# Patient Record
Sex: FEMALE | Race: WHITE | NOT HISPANIC OR LATINO | Employment: OTHER | URBAN - METROPOLITAN AREA
[De-identification: names, ages, dates, MRNs, and addresses within clinical notes are randomized per-mention and may not be internally consistent; named-entity substitution may affect disease eponyms.]

---

## 2017-01-06 ENCOUNTER — TELEPHONE (OUTPATIENT)
Dept: TRANSPLANT | Facility: CLINIC | Age: 57
End: 2017-01-06

## 2017-01-09 ENCOUNTER — OFFICE VISIT (OUTPATIENT)
Dept: TRANSPLANT | Facility: CLINIC | Age: 57
End: 2017-01-09
Payer: COMMERCIAL

## 2017-01-09 ENCOUNTER — LAB VISIT (OUTPATIENT)
Dept: LAB | Facility: HOSPITAL | Age: 57
End: 2017-01-09
Attending: INTERNAL MEDICINE
Payer: COMMERCIAL

## 2017-01-09 VITALS
WEIGHT: 128.06 LBS | TEMPERATURE: 98 F | HEART RATE: 85 BPM | OXYGEN SATURATION: 97 % | DIASTOLIC BLOOD PRESSURE: 49 MMHG | BODY MASS INDEX: 24.18 KG/M2 | RESPIRATION RATE: 17 BRPM | SYSTOLIC BLOOD PRESSURE: 146 MMHG | HEIGHT: 61 IN

## 2017-01-09 DIAGNOSIS — T86.11 ACUTE REJECTION OF KIDNEY TRANSPLANT: ICD-10-CM

## 2017-01-09 DIAGNOSIS — Z94.0 DECEASED-DONOR KIDNEY TRANSPLANT: Chronic | ICD-10-CM

## 2017-01-09 DIAGNOSIS — D84.9 IMMUNOSUPPRESSION: Chronic | ICD-10-CM

## 2017-01-09 DIAGNOSIS — Z94.4 STATUS POST LIVER TRANSPLANTATION: Primary | ICD-10-CM

## 2017-01-09 DIAGNOSIS — Z94.4 STATUS POST LIVER TRANSPLANTATION: ICD-10-CM

## 2017-01-09 LAB
ALBUMIN SERPL BCP-MCNC: 3.5 G/DL
ALP SERPL-CCNC: 101 U/L
ALT SERPL W/O P-5'-P-CCNC: 6 U/L
ANION GAP SERPL CALC-SCNC: 10 MMOL/L
AST SERPL-CCNC: 10 U/L
BILIRUB SERPL-MCNC: 0.4 MG/DL
BUN SERPL-MCNC: 30 MG/DL
CALCIUM SERPL-MCNC: 9.3 MG/DL
CHLORIDE SERPL-SCNC: 105 MMOL/L
CO2 SERPL-SCNC: 22 MMOL/L
CREAT SERPL-MCNC: 1.4 MG/DL
EST. GFR  (AFRICAN AMERICAN): 48.1 ML/MIN/1.73 M^2
EST. GFR  (NON AFRICAN AMERICAN): 41.7 ML/MIN/1.73 M^2
GLUCOSE SERPL-MCNC: 98 MG/DL
POTASSIUM SERPL-SCNC: 3.8 MMOL/L
PROT SERPL-MCNC: 7.4 G/DL
SODIUM SERPL-SCNC: 137 MMOL/L

## 2017-01-09 PROCEDURE — 1159F MED LIST DOCD IN RCRD: CPT | Mod: S$GLB,,, | Performed by: INTERNAL MEDICINE

## 2017-01-09 PROCEDURE — 99214 OFFICE O/P EST MOD 30 MIN: CPT | Mod: S$GLB,,, | Performed by: INTERNAL MEDICINE

## 2017-01-09 PROCEDURE — 99999 PR PBB SHADOW E&M-EST. PATIENT-LVL III: CPT | Mod: PBBFAC,,, | Performed by: INTERNAL MEDICINE

## 2017-01-09 PROCEDURE — 80053 COMPREHEN METABOLIC PANEL: CPT

## 2017-01-09 PROCEDURE — 36415 COLL VENOUS BLD VENIPUNCTURE: CPT

## 2017-01-09 RX ORDER — CEFPODOXIME PROXETIL 200 MG/1
TABLET, FILM COATED ORAL
Refills: 0 | COMMUNITY
Start: 2016-12-29 | End: 2018-10-22

## 2017-01-09 NOTE — PROGRESS NOTES
Subjective:       Patient ID: Virginia Lentz is a 56 y.o. female.    Chief Complaint: Liver Transplant Follow-up    HPI  I saw this 56 yr old  lady in the liver transplant follow up clinic. 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    She is now 5 years post transplant and is doing very well.  She had an episode of recidivism but is off alcohol now and is very positive about her long term outlook.    She had an episode of acute kidney rejection early on post op but her LFTs have generally been normal. She is on immunosuppression with Tacrolimus, MMF and prednisone (Pred 5 mg, MMF 750mg BID,Tac 2).    She is well and does not have any symptoms related to her abdomen or liver.      Recent issues with recurrent right lobe pneumonia- under pulmonary follow up.  - mechanical issue with stricturing.  - on recurrent courses of antibiotics and under local pulmonary follow up.          Lab Results   Component Value Date    ALT 6 (L) 10/20/2014    AST 12 10/20/2014    GGT 73 (H) 10/20/2014    ALKPHOS 80 10/20/2014    BILITOT 0.4 10/20/2014     Past Medical History   Diagnosis Date    Alcoholic cirrhosis 7/10/2012    CKD (chronic kidney disease) stage 3, GFR 30-59 ml/min     -donor kidney transplant 7/10/2012    Hypocitraturic calcium nephrolithiasis 2013 24 urine showed low urine citrate.    Hypothyroidism 7/10/2012    Liver replaced by transplant 7/10/2012    Need for prophylactic immunotherapy 7/10/2012    Pancreatitis  Aug 2011    Recurrent nephrolithiasis 7/10/2012     Past Surgical History   Procedure Laterality Date    Kidney transplant      Liver transplant      Tonsillectomy      Appendectomy       section      Tracheostomy tube placement      Tracheostomy closure  Mar 2012     Current Outpatient Prescriptions   Medication Sig    alprazolam (XANAX) 0.25 MG tablet  Take 0.5 mg by mouth Twice daily.     amlodipine (NORVASC) 5 MG tablet Take 2 tablets (10 mg total) by mouth once daily. 1 Tablet Oral Every day    aspirin (ECOTRIN) 81 MG EC tablet Take 81 mg by mouth Daily. 1 Tablet, Delayed Release (E.C.) Oral Every day.      biotin 500 mcg Cap Take 1 mg by mouth Daily. 1  Oral Every day    calcitonin, salmon, (FORTICAL) 200 unit/actuation nasal spray 1 spray by Nasal route nightly.     cloNIDine (CATAPRES) 0.1 MG tablet Take 0.1 mg by mouth nightly.     hydrocodone-acetaminophen 7.5-325mg (NORCO) 7.5-325 mg per tablet Take 1 tablet by mouth 2 (two) times daily.     loperamide (IMODIUM) 2 mg capsule Take 2 mg by mouth daily as needed. 1 Capsule Oral     magnesium oxide (MAG-OX) 400 mg tablet Take 2 tablets by mouth 2 (two) times daily.    multivitamin (THERAGRAN) per tablet Take by mouth Daily. 1 Tablet Oral Every day.  Over-the-counter Multivitamin    mycophenolate (CELLCEPT) 250 mg Cap TAKE 3 CAPSULES TWICE DAILY    NEXIUM 40 mg capsule Take 40 mg by mouth 2 (two) times daily.     ondansetron (ZOFRAN ODT) 8 MG TbDL Take 8 mg by mouth Use as needed. 1 Tablet, Rapid Dissolve Oral Every 12 hours    predniSONE (DELTASONE) 10 MG tablet Take 5 mg by mouth Daily. Half Tablet Oral as directed    sertraline (ZOLOFT) 25 MG tablet Take 100 mg by mouth once daily. 1 Tablet Oral Every day    tacrolimus (PROGRAF) 1 MG Cap TAKE (2) CAPSULES TWICE DAILY. (Patient taking differently: TAKE (2) in the morning and (1) at night.)    zolpidem (AMBIEN) 5 MG Tab Take 5 mg by mouth every evening. 1 Tablet Oral At bedtime    cefpodoxime (VANTIN) 200 MG tablet take 1 tablet by mouth every 12 hours for 14 days     No current facility-administered medications for this visit.        Review of Systems   Constitutional: Negative for activity change, appetite change, chills, fatigue, fever and unexpected weight change.   HENT: Negative for ear pain, hearing loss, mouth sores, nosebleeds, sore  throat and trouble swallowing.    Eyes: Negative for pain, redness and visual disturbance.   Respiratory: Negative for cough, chest tightness, shortness of breath and wheezing.    Cardiovascular: Negative for chest pain, palpitations and leg swelling.   Gastrointestinal: Negative for abdominal distention, abdominal pain, blood in stool, constipation, diarrhea, nausea and vomiting.   Genitourinary: Negative for decreased urine volume, difficulty urinating, dysuria, frequency, hematuria, menstrual problem and urgency.   Musculoskeletal: Negative for arthralgias, back pain, gait problem, joint swelling and myalgias.   Skin: Negative for pallor, rash and wound.   Neurological: Negative for dizziness, tremors, seizures, syncope, speech difficulty, weakness, light-headedness, numbness and headaches.   Hematological: Negative for adenopathy. Does not bruise/bleed easily.   Psychiatric/Behavioral: Negative for confusion, decreased concentration, dysphoric mood and sleep disturbance. The patient is not nervous/anxious.          Objective:      Physical Exam   Constitutional: She appears well-developed and well-nourished. No distress.   HENT:   Head: Normocephalic.   Eyes: Pupils are equal, round, and reactive to light.   Neck: No JVD present. No tracheal deviation present. No thyromegaly present.   Cardiovascular: Normal rate, regular rhythm and normal heart sounds.    No murmur heard.  Pulmonary/Chest: Effort normal and breath sounds normal. No stridor.   Abdominal: Soft.   Lymphadenopathy:        Head (right side): No submental, no submandibular, no tonsillar, no preauricular, no posterior auricular and no occipital adenopathy present.        Head (left side): No submental, no submandibular, no tonsillar, no preauricular, no posterior auricular and no occipital adenopathy present.     She has no cervical adenopathy.     She has no axillary adenopathy.   Neurological: She is alert. She has normal strength. She is not  disoriented. No cranial nerve deficit or sensory deficit.   Skin: Skin is intact. No cyanosis.             Assessment:       1. Status post liver transplantation    2. -donor kidney transplant    3. Acute rejection of kidney transplant    4. Immunosuppression        Plan:   Overall well.    - no change in her immunosuppression in view of her early kidney rejection.  - recurrent pneumonia due to mechanical issue dealt with locally with dilatations.  - BP normally well controlled on 2 agents- PCP  - creatinine within normal range but a little higher than baseline- will recheck today.    Clinic in 1 year.

## 2017-01-09 NOTE — MR AVS SNAPSHOT
[facility]  [address]  [facility] [address]  Phone: [phone]                  Virginia Lentz   2017 8:00 AM   Office Visit    Description:  Female : 1960   Provider:  Ruben Ferrell MD   Department:  [facility]           Reason for Visit     Liver Transplant Follow-up           Diagnoses this Visit        Comments    Status post liver transplantation    -  Primary            To Do List           Goals (5 Years of Data)     None      [facility]     [facility] Care Line -  Assistance  Registered nurses in the [facility] On Call Center provide clinical advisement, health education, appointment booking, and other advisory services.  Call for this free service at [phone].             Medications           Message regarding Medications     Verify the changes and/or additions to your medication regime listed below are the same as discussed with your clinician today.  If any of these changes or additions are incorrect, please notify your healthcare provider.             Verify that the below list of medications is an accurate representation of the medications you are currently taking.  If none reported, the list may be blank. If incorrect, please contact your healthcare provider. Carry this list with you in case of emergency.           Current Medications     alprazolam (XANAX) 0.25 MG tablet Take 0.5 mg by mouth Twice daily.     amlodipine (NORVASC) 5 MG tablet Take 2 tablets (10 mg total) by mouth once daily. 1 Tablet Oral Every day    aspirin (ECOTRIN) 81 MG EC tablet Take 81 mg by mouth Daily. 1 Tablet, Delayed Release (E.C.) Oral Every day.      biotin 500 mcg Cap Take 1 mg by mouth Daily. 1  Oral Every day    calcitonin, salmon, (FORTICAL) 200 unit/actuation nasal spray 1 spray by Nasal route nightly.     cloNIDine (CATAPRES) 0.1 MG tablet Take 0.1 mg by mouth nightly.     hydrocodone-acetaminophen 7.5-325mg (NORCO)  "7.5-325 mg per tablet Take 1 tablet by mouth 2 (two) times daily.     loperamide (IMODIUM) 2 mg capsule Take 2 mg by mouth daily as needed. 1 Capsule Oral     magnesium oxide (MAG-OX) 400 mg tablet Take 2 tablets by mouth 2 (two) times daily.    multivitamin (THERAGRAN) per tablet Take by mouth Daily. 1 Tablet Oral Every day.  Over-the-counter Multivitamin    mycophenolate (CELLCEPT) 250 mg Cap TAKE 3 CAPSULES TWICE DAILY    NEXIUM 40 mg capsule Take 40 mg by mouth 2 (two) times daily.     ondansetron (ZOFRAN ODT) 8 MG TbDL Take 8 mg by mouth Use as needed. 1 Tablet, Rapid Dissolve Oral Every 12 hours    predniSONE (DELTASONE) 10 MG tablet Take 5 mg by mouth Daily. Half Tablet Oral as directed    sertraline (ZOLOFT) 25 MG tablet Take 100 mg by mouth once daily. 1 Tablet Oral Every day    tacrolimus (PROGRAF) 1 MG Cap TAKE (2) CAPSULES TWICE DAILY.    zolpidem (AMBIEN) 5 MG Tab Take 5 mg by mouth every evening. 1 Tablet Oral At bedtime    cefpodoxime (VANTIN) 200 MG tablet take 1 tablet by mouth every 12 hours for 14 days           Clinical Reference Information           Vital Signs - Last Recorded  Most recent update: 1/9/2017  8:14 AM by Kourtney Baldwin MA    BP Pulse Temp Resp Ht Wt    (!) 146/49 (BP Location: Right arm, Patient Position: Sitting, BP Method: Automatic) 85 98.2 °F (36.8 °C) (Oral) 17 5' 1" (1.549 m) 58.1 kg (128 lb 1.4 oz)    SpO2 BMI             97% 24.2 kg/m2         Blood Pressure          Most Recent Value    BP  (!)  146/49      Allergies as of 1/9/2017     Levaquin [Levofloxacin]      Immunizations Administered on Date of Encounter - 1/9/2017     None      Orders Placed During Today's Visit     Future Labs/Procedures Expected by Expires    Comprehensive metabolic panel  1/9/2017 3/10/2018      Maintenance Dialysis History     Patient has no recorded history of maintenance dialysis.      Transplant Information        Txp Date Organ Coordinator Care Team    11/29/2011 Liver Angelic Spence, " RN Surgeon:  Oc Ritchie MD   Referring Physician:  Melissa Garcias MD   Assisting Surgeon:  Alex Roe MD   Current Hepatologist:  Ruben Ferrell MD   Corresponding Physician:  Melissa Garcias MD   Current PCP:  Melissa Garcias MD          Txp Date Organ Coordinator Care Team    11/29/2011 Kidney Angelic Spence RN Surgeon:  Oc Ritchie MD   Referring Physician:  Melissa Garcias MD

## 2017-01-09 NOTE — Clinical Note
January 9, 2017        Melissa Garcias             [facility]  [address]  [facility] [address]  Phone: [phone]   Patient: Virginia Lentz   MR Number: [id number]   YOB: 1960   Date of Visit: 1/9/2017       Dear Dr. Melissa Garcias    Thank you for referring Virginia Lentz to me for evaluation. Attached you will find relevant portions of my assessment and plan of care.    If you have questions, please do not hesitate to call me. I look forward to following Virginia Lentz along with you.    Sincerely,    Ruben Ferrell MD    Enclosure    If you would like to receive this communication electronically, please contact [email or web address] or [phone] to request [employer] access.    [employer] is a tool which provides read-only access to select patient information with whom you have a relationship. Its easy to use and provides real time access to review your patients record including encounter summaries, notes, results, and demographic information.    If you feel you have received this communication in error or would no longer like to receive these types of communications, please e-mail [email or web address]

## 2017-01-16 ENCOUNTER — TELEPHONE (OUTPATIENT)
Dept: TRANSPLANT | Facility: CLINIC | Age: 57
End: 2017-01-16

## 2017-01-16 NOTE — TELEPHONE ENCOUNTER
Pt's lab did not draw a Tacrolimus level.  Tacrine level resulted. Called pt to ask to repeat labs. No answer, message left for callack with instructions to repeat labs tomorrow.

## 2017-02-01 ENCOUNTER — TELEPHONE (OUTPATIENT)
Dept: TRANSPLANT | Facility: CLINIC | Age: 57
End: 2017-02-01

## 2017-02-01 NOTE — TELEPHONE ENCOUNTER
"----- Message from Ruben Ferrell MD sent at 1/25/2017  9:18 AM CST -----  After reviewing the microblading technique, I reached the conclusion that it does carry some risk - mainly infection- because of multiple small cuts to the skin.  We cannot recommend it but ultimately it's her decision.    GT  ----- Message -----     From: Angelic Spence RN     Sent: 1/24/2017   5:01 PM       To: Ruben Ferrell MD    This pt has experienced hair loss and would like to have microblasion done to eyebrows.  Per wikipedia  "Microblading, also known by a variety of names such as eyebrow embroidery, microstroking, feather touch and hair like strokes is a form of permanent makeup that provides a means to partially or fully camouflage missing eyebrow hair with the appearance of simulated hair using fine deposits of cosmetic tattoo pigments.".   Is this okay for her to have this done?    "

## 2017-03-01 RX ORDER — MYCOPHENOLATE MOFETIL 250 MG/1
CAPSULE ORAL
Qty: 180 CAPSULE | Refills: 6 | Status: SHIPPED | OUTPATIENT
Start: 2017-03-01 | End: 2017-11-20 | Stop reason: SDUPTHER

## 2017-05-18 ENCOUNTER — TELEPHONE (OUTPATIENT)
Dept: TRANSPLANT | Facility: CLINIC | Age: 57
End: 2017-05-18

## 2017-05-18 NOTE — TELEPHONE ENCOUNTER
Patient was due for lab on 5/15/17.  Called patient's lab, they report patient did not have labs drawn.  Left message on patient's voicemail requesting call to office.

## 2017-05-26 ENCOUNTER — TELEPHONE (OUTPATIENT)
Dept: TRANSPLANT | Facility: CLINIC | Age: 57
End: 2017-05-26

## 2017-05-26 NOTE — TELEPHONE ENCOUNTER
----- Message from Leela Dale sent at 5/26/2017 11:38 AM CDT -----  Called patient's lab to request results.  Lab reports patient did not come in for lab.  Called patient, she said, she will go on 5/29/17.

## 2017-06-02 ENCOUNTER — TELEPHONE (OUTPATIENT)
Dept: TRANSPLANT | Facility: CLINIC | Age: 57
End: 2017-06-02

## 2017-06-02 NOTE — TELEPHONE ENCOUNTER
----- Message from Leela Dale sent at 6/2/2017  2:04 PM CDT -----  Called patient's lab to request results.  Lab reports patient did not come in for lab.  Called patient and left message requesting labs be drawn, 6/5/17.

## 2017-06-08 LAB
EXT ALBUMIN: 4.2
EXT ALKALINE PHOSPHATASE: 240
EXT ALT: 66
EXT AST: 87
EXT BASOPHIL%: 1
EXT BILIRUBIN DIRECT: 0.1 MG/DL
EXT BILIRUBIN TOTAL: 0.6
EXT BUN: 18
EXT CALCIUM: 9.7
EXT CHLORIDE: 101
EXT CO2: 18
EXT CREATININE: 1.38 MG/DL
EXT EOSINOPHIL%: 1.8
EXT GLUCOSE: 73
EXT HEMATOCRIT: 38
EXT HEMOGLOBIN: 12.2
EXT LYMPH%: 16.1
EXT MONOCYTES%: 7.5
EXT PLATELETS: 354
EXT POTASSIUM: 4.5
EXT PROTEIN TOTAL: 7
EXT SEGS%: 73.5
EXT SODIUM: 135 MMOL/L
EXT TACROLIMUS LVL: 5.7
EXT WBC: 6.1

## 2017-06-15 ENCOUNTER — TELEPHONE (OUTPATIENT)
Dept: TRANSPLANT | Facility: CLINIC | Age: 57
End: 2017-06-15

## 2017-06-15 NOTE — TELEPHONE ENCOUNTER
----- Message from Ruben Ferrell MD sent at 6/9/2017  4:38 PM CDT -----  Results reviewed  Repeat next week. Is she drinking again

## 2017-06-15 NOTE — TELEPHONE ENCOUNTER
Spoke to pt. Pt reports that she did consume alcohol after a close friend passed away.  She confirmed that she has not consumed alcohol since that time.  Discussed with pt the importance of abstaining from alcohol after liver transplant. Pt verbalized understanding and will repeat labs on 6/22.

## 2017-06-15 NOTE — TELEPHONE ENCOUNTER
Labs reviewed by Dr. Ferrell. Elevated liver enzymes, pt needs repeat labs.  Called pt to advise. Voicemail left on mobile and home phone for callback today.

## 2017-06-15 NOTE — TELEPHONE ENCOUNTER
----- Message from Lynda Rios sent at 6/15/2017  2:13 PM CDT -----  Contact: pt  Will be having labs done on 6/22 wanted to let Angelic know

## 2017-06-24 ENCOUNTER — NURSE TRIAGE (OUTPATIENT)
Dept: ADMINISTRATIVE | Facility: CLINIC | Age: 57
End: 2017-06-24

## 2017-06-24 NOTE — TELEPHONE ENCOUNTER
"  Reason for Disposition   [1] Caller requests to speak ONLY to PCP AND [2] NON-URGENT question    Answer Assessment - Initial Assessment Questions  1. REASON FOR CALL or QUESTION: "What is your reason for calling today?" or "How can I best  help you?" or "What question do you have that I can help answer?"      Patient calling states she received a call from her PCP in home town Dr. LAURA Lai [place]. Informed her that her prograf level "3.7" drawn yesterday. Patient states PCP requesting advisement regarding medication adjustment if needed or recommended. Patient states she is asymptotic at time of call.   2. CALLER: Document the source of call. (e.g., laboratory, patient).      Patient direct call.    Protocols used:  PCP CALL - NO TRIAGE-A-AH  *LIVER-KIDNEY TRANSPLANT 11/28/2011*  *REFERENCED TRANSPLANT BPA #9, #[id number]*  "

## 2017-06-24 NOTE — TELEPHONE ENCOUNTER
Spoke with transplant hepatologist Dr. Montana regarding lab results/medication adjustment that there are no recommended adjustment in medication needed at this time; patient should follow-up with transplant coordinator. Patient informed/understood/agreed to such.

## 2017-06-26 ENCOUNTER — TELEPHONE (OUTPATIENT)
Dept: TRANSPLANT | Facility: CLINIC | Age: 57
End: 2017-06-26

## 2017-06-26 LAB
EXT ALBUMIN: 3.5
EXT ALKALINE PHOSPHATASE: 75
EXT ALT: 4
EXT AST: 7
EXT BASOPHIL%: 0.9
EXT BILIRUBIN TOTAL: 0.2
EXT BUN: 33
EXT CALCIUM: 8.9
EXT CHLORIDE: 104
EXT CO2: 20
EXT CREATININE: 1.24 MG/DL
EXT EOSINOPHIL%: 2.87
EXT GLUCOSE: 86
EXT HEMATOCRIT: 33.3
EXT HEMOGLOBIN: 10.5
EXT LYMPH%: 18.2
EXT MONOCYTES%: 13.9
EXT PLATELETS: 303
EXT POTASSIUM: 4.9
EXT PROTEIN TOTAL: 6.3
EXT SEGS%: 64.2
EXT SODIUM: 133 MMOL/L
EXT TACROLIMUS LVL: 3.7
EXT WBC: 6.7

## 2017-06-27 ENCOUNTER — TELEPHONE (OUTPATIENT)
Dept: TRANSPLANT | Facility: CLINIC | Age: 57
End: 2017-06-27

## 2017-06-27 NOTE — LETTER
June 27, 2017    Virginia Lentz  [address] [address]          Dear Virginia Lentz:  MRN: [id number]    Your lab results were stable.  There are no medicine changes.  Please have your labs drawn again on 9/11/17.      If you cannot have your labs drawn on the scheduled date, it is your responsibility to call the transplant department to reschedule.  To reschedule or make an appointment, please as to speak to or leave a message for my assistant, Lore Hernandez, at [phone].  When leaving a message for Mary Torrez, or myself, we ask that you leave a brief message regarding your request.    Sincerely,      Angelic Spence RN, BSN  Your Liver Transplant Coordinator    [facility]  [address] [place] [address] [phone]

## 2017-06-27 NOTE — TELEPHONE ENCOUNTER
Labs reviewed by Dr. Ferrell. Stable labs,no medication changes. Next labs on 9/11/17. Letter sent.

## 2017-09-13 ENCOUNTER — TELEPHONE (OUTPATIENT)
Dept: TRANSPLANT | Facility: CLINIC | Age: 57
End: 2017-09-13

## 2017-09-13 NOTE — TELEPHONE ENCOUNTER
----- Message from Leela Dale sent at 9/13/2017  8:20 AM CDT -----  Called patient's lab to request results.  Lab reports patient did not come in for lab.  Called patient, she will go on 9/19/17.

## 2017-09-21 ENCOUNTER — TELEPHONE (OUTPATIENT)
Dept: TRANSPLANT | Facility: CLINIC | Age: 57
End: 2017-09-21

## 2017-09-21 RX ORDER — TACROLIMUS 1 MG/1
CAPSULE ORAL
Qty: 120 CAPSULE | Refills: 6 | Status: SHIPPED | OUTPATIENT
Start: 2017-09-21 | End: 2018-07-24 | Stop reason: SDUPTHER

## 2017-09-21 NOTE — TELEPHONE ENCOUNTER
Per Leela Dale -     Patient was due for lab on 9/18/17.  Called patient's lab, they report patient did not have labs drawn.  Patient rescheduled to 9/25/17.

## 2017-09-29 ENCOUNTER — TELEPHONE (OUTPATIENT)
Dept: TRANSPLANT | Facility: CLINIC | Age: 57
End: 2017-09-29

## 2017-09-29 NOTE — TELEPHONE ENCOUNTER
----- Message from Leela Dale sent at 9/29/2017  8:23 AM CDT -----  Called patient's lab to request results.  Lab reports patient did not come in for lab.  Called patient. She will go 10/2/17.

## 2017-10-09 LAB
EXT ALBUMIN: 4.2
EXT ALKALINE PHOSPHATASE: 97
EXT ALT: 6
EXT AST: 9
EXT BASOPHIL%: 0.6
EXT BILIRUBIN TOTAL: 0.5
EXT BUN: 19
EXT CALCIUM: 9.5
EXT CHLORIDE: 102
EXT CO2: 21
EXT CREATININE: 1.44 MG/DL
EXT EOSINOPHIL%: 1.5
EXT GLUCOSE: 82
EXT HEMATOCRIT: 36.4
EXT HEMOGLOBIN: 11.7
EXT LYMPH%: 22.2
EXT MONOCYTES%: 9.8
EXT PLATELETS: 353
EXT POTASSIUM: 4.6
EXT PROTEIN TOTAL: 7
EXT SEGS%: 65.9
EXT SODIUM: 134 MMOL/L
EXT TACROLIMUS LVL: 5.5
EXT WBC: 5.3

## 2017-10-13 ENCOUNTER — TELEPHONE (OUTPATIENT)
Dept: TRANSPLANT | Facility: CLINIC | Age: 57
End: 2017-10-13

## 2017-11-10 ENCOUNTER — TELEPHONE (OUTPATIENT)
Dept: TRANSPLANT | Facility: CLINIC | Age: 57
End: 2017-11-10

## 2017-11-10 NOTE — TELEPHONE ENCOUNTER
----- Message from Rl Taveras sent at 11/10/2017 11:26 AM CST -----  Contact: patient   Patient have a medical question about her medication and would like a call         Please call [phone]        Thanks!!!

## 2017-11-20 RX ORDER — MYCOPHENOLATE MOFETIL 250 MG/1
CAPSULE ORAL
Qty: 180 CAPSULE | Refills: 6 | Status: SHIPPED | OUTPATIENT
Start: 2017-11-20 | End: 2018-07-24 | Stop reason: SDUPTHER

## 2018-01-11 ENCOUNTER — TELEPHONE (OUTPATIENT)
Dept: TRANSPLANT | Facility: CLINIC | Age: 58
End: 2018-01-11

## 2018-01-29 ENCOUNTER — TELEPHONE (OUTPATIENT)
Dept: TRANSPLANT | Facility: CLINIC | Age: 58
End: 2018-01-29

## 2018-01-29 NOTE — TELEPHONE ENCOUNTER
----- Message from Lore Taveras sent at 1/29/2018  1:09 PM CST -----  Contact: Pt   Done, I cx'd her appt    ----- Message -----  From: Patricia Johnson  Sent: 1/29/2018  11:28 AM  To: [facility] Post-Liver Transplant Non-Clinical    Pt would like to cancel her appt on 1-31-18, she unable to make it.    She will call and reschedule at a later time.

## 2018-01-30 ENCOUNTER — TELEPHONE (OUTPATIENT)
Dept: TRANSPLANT | Facility: CLINIC | Age: 58
End: 2018-01-30

## 2018-01-30 LAB
EXT ALBUMIN: 4
EXT ALKALINE PHOSPHATASE: 79
EXT ALT: 3
EXT AST: 8
EXT BASOPHIL%: 0.8
EXT BILIRUBIN TOTAL: 0.4
EXT BUN: 28
EXT CALCIUM: 8.8
EXT CHLORIDE: 100
EXT CO2: 25
EXT CREATININE: 1.51 MG/DL
EXT EOSINOPHIL%: 2.4
EXT GFR MDRD AF AMER: 43
EXT GFR MDRD NON AF AMER: 37
EXT GGT: 29
EXT GLUCOSE: 99
EXT HEMATOCRIT: 35.8
EXT HEMOGLOBIN: 11.5
EXT LYMPH%: 25.4
EXT MONOCYTES%: 12.9
EXT PLATELETS: 296
EXT POTASSIUM: 4.3
EXT PROTEIN TOTAL: 6.7
EXT SEGS%: 58.5
EXT SODIUM: 134 MMOL/L
EXT TACROLIMUS LVL: 5.1
EXT WBC: 5

## 2018-01-30 NOTE — TELEPHONE ENCOUNTER
Pt reports that she sees Dr. Hughes locally.  Message sent to Dr. Ferrell to notify.  Discussed lab results with patient.  Stable labs, no medication changes. Next labs 4/16/18. Letter sent.  Pt reports she will call back to reschedule annual transplant appointment.

## 2018-01-30 NOTE — LETTER
January 30, 2018    Virginia Lentz  [address] [address]          Dear Virginia Lentz:  MRN: [id number]    Your lab results were stable.  There are no medicine changes.  Please have your labs drawn again on 4/16/18.      If you cannot have your labs drawn on the scheduled date, it is your responsibility to call the transplant department to reschedule.  To reschedule or make an appointment, please as to speak to or leave a message for my assistant, Lore Hernandez, at [phone].  When leaving a message for Mary Torrez, or myself, we ask that you leave a brief message regarding your request.    Sincerely,        Your Liver Transplant Coordinator    [facility]  [facility] [place] [address]  [phone]

## 2018-01-30 NOTE — TELEPHONE ENCOUNTER
----- Message from Ruben Ferrell MD sent at 1/30/2018  9:18 AM CST -----  Results reviewed  Is she seeing a nephrologist?

## 2018-05-24 ENCOUNTER — TELEPHONE (OUTPATIENT)
Dept: TRANSPLANT | Facility: CLINIC | Age: 58
End: 2018-05-24

## 2018-05-24 NOTE — TELEPHONE ENCOUNTER
----- Message from Lore Taveras sent at 5/24/2018  4:40 PM CDT -----  Contact: patient   Ms. Lentz ananda'd her appt with Carlie , she said she had a family emergency out of town . She call back next week when she returns.     ----- Message -----  From: Rl Taveras  Sent: 5/24/2018   4:20 PM  To: [facility] Post-Liver Transplant Non-Clinical    Patient have an appointment schedule for tomorrow Friday, May 25, 2018 and would like to cancel.           Please call [phone]      Thanks!

## 2018-06-26 ENCOUNTER — NURSE TRIAGE (OUTPATIENT)
Dept: ADMINISTRATIVE | Facility: CLINIC | Age: 58
End: 2018-06-26

## 2018-06-26 ENCOUNTER — TELEPHONE (OUTPATIENT)
Dept: HEPATOLOGY | Facility: HOSPITAL | Age: 58
End: 2018-06-26

## 2018-06-26 NOTE — TELEPHONE ENCOUNTER
Kidney and liver transplant in 2011. Has been doing well for past 6 years. Caller is pt's daughter who is in [place] and pt is in [place]. Pt was in [place] for vacation. Caller states pt was seen in an urgent care and diagnosed with pneumonia and was sent to ED in [place]. Pt is still in ED at this time. Caller spoke with nurse over an hour ago at the ED where pt is being seen. Caller states that she is an ED physician and is concerned about her mom's lab values and is concerned about her care at the ED in [place]. Caller advised to have the ED where pt is hospitalized to contact transplant on call provider directly.    Reason for Disposition   Nursing judgment    Protocols used: ST NO GUIDELINE OR REFERENCE [id number]

## 2018-06-26 NOTE — TELEPHONE ENCOUNTER
Attempted to return call regarding possible hospital admission for pneumonia.   unable to locate physician that attempted call and no further information provided.      Patient has remote history of SLK and reported to have infectious symptoms.  Reasonable to proceed with local admission and can be contacted if there are any transplant related issues that arise.

## 2018-07-24 RX ORDER — MYCOPHENOLATE MOFETIL 250 MG/1
CAPSULE ORAL
Qty: 180 CAPSULE | Refills: 6 | Status: SHIPPED | OUTPATIENT
Start: 2018-07-24 | End: 2019-02-01 | Stop reason: SDUPTHER

## 2018-07-24 RX ORDER — TACROLIMUS 1 MG/1
CAPSULE ORAL
Qty: 120 CAPSULE | Refills: 6 | Status: SHIPPED | OUTPATIENT
Start: 2018-07-24 | End: 2018-10-22

## 2018-07-26 ENCOUNTER — TELEPHONE (OUTPATIENT)
Dept: TRANSPLANT | Facility: CLINIC | Age: 58
End: 2018-07-26

## 2018-07-26 NOTE — TELEPHONE ENCOUNTER
Pt has not completed labs.  Pt spoke to CIRA Dale, , and reports that she will have labs drawn on Monday.

## 2018-07-31 ENCOUNTER — TELEPHONE (OUTPATIENT)
Dept: TRANSPLANT | Facility: CLINIC | Age: 58
End: 2018-07-31

## 2018-07-31 NOTE — LETTER
July 31, 2018    Virginia Lentz  [address] [address]             Dear Virginia Lentz:  MRN: [id number]    This is a letter to confirm that you have not contacted us for two or more months for lab appointments.  Your labs are performed to let us tell how well your liver is working and to rule out rejection.  It is critical that you get your labs drawn when you are instructed.  You may be risking your health and your life by not having your labs drawn.  Compliance is a very important issue in transplant patients.  We expect you to help yourself at least as much as we are willing to help you.  If you do not contact your transplant coordinator within one week of receiving this letter, we will no longer try to contact you.  It is your responsibility to maintain compliance with post-transplant care.      If you have any questions, please contact your coordinator at [phone].    Sincerely,        Fabien Montana MD, PhD  Medical Director, [facility]  , Gastroenterology and Hepatology    [facility]  [address] [place] [address]  [phone]

## 2018-07-31 NOTE — LETTER
July 31, 2018    Virginia Lentz  [address] [address]          Dear Virginia Lentz:  MRN: [id number]    Your lab work was due to be drawn on April 24, 2018.  We contacted your lab and were unable to get test results.  It is very important to get your labs drawn as scheduled.  We cannot monitor you for rejection, infections, or drug toxicity side effects without lab results.  Please call us at [phone] as soon as possible to let us know when you plan to have labs drawn.    Sincerely,      Angelic Spence RN, BSN  Your Liver Transplant Coordinator    [facility]  [address] [place] [address]  [phone]

## 2018-08-09 ENCOUNTER — TELEPHONE (OUTPATIENT)
Dept: TRANSPLANT | Facility: CLINIC | Age: 58
End: 2018-08-09
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
## 2018-08-15 ENCOUNTER — TELEPHONE (OUTPATIENT)
Dept: TRANSPLANT | Facility: CLINIC | Age: 58
End: 2018-08-15

## 2018-08-15 NOTE — TELEPHONE ENCOUNTER
----- Message from Lorin Huerta sent at 8/15/2018 11:41 AM CDT -----  Contact: patient  Needs Advice    Reason for call:    would like to discuss a few concerns   Communication Preference: [phone]  Additional Information: n/a

## 2018-08-15 NOTE — TELEPHONE ENCOUNTER
Pt reports having labs drawn 8/9/18, will call for results.  Pt would also like to reschedule appt.  Message sent to staff.

## 2018-08-16 LAB
EXT ALBUMIN: 3.9
EXT ALKALINE PHOSPHATASE: 64
EXT ALT: 10
EXT AST: 7
EXT BASOPHIL%: 1
EXT BILIRUBIN TOTAL: 0.5
EXT BUN: 38
EXT CALCIUM: 9.1
EXT CHLORIDE: 107
EXT CO2: 20
EXT CREATININE: 1.49 MG/DL
EXT EOSINOPHIL%: 1.9
EXT GLUCOSE: 91
EXT HEMATOCRIT: 33.2
EXT HEMOGLOBIN: 10.4
EXT LYMPH%: 26.8
EXT MONOCYTES%: 12.5
EXT PLATELETS: 285
EXT POTASSIUM: 4.5
EXT PROTEIN TOTAL: 6.7
EXT SEGS%: 57.8
EXT SODIUM: 137 MMOL/L
EXT TACROLIMUS LVL: 3.4
EXT WBC: 5.2

## 2018-08-17 ENCOUNTER — TELEPHONE (OUTPATIENT)
Dept: TRANSPLANT | Facility: CLINIC | Age: 58
End: 2018-08-17

## 2018-08-17 NOTE — LETTER
August 17, 2018    Virginia Lentz  [address] [address]          Dear Virginia Lentz:  MRN: [id number]    Your lab results were stable.  There are no medicine changes.  Please have your labs drawn again on 11/12/18.      If you cannot have your labs drawn on the scheduled date, it is your responsibility to call the transplant department to reschedule.  To reschedule or make an appointment, please as to speak to or leave a message for my assistant, Lore Hernandez, at [phone].  When leaving a message for Mary Trorez, or myself, we ask that you leave a brief message regarding your request.    Sincerely,      Angelic Spence RN, BSN  Your Liver Transplant Coordinator    [facility]  [address] [place] [id number] [phone]

## 2018-10-03 ENCOUNTER — TELEPHONE (OUTPATIENT)
Dept: TRANSPLANT | Facility: CLINIC | Age: 58
End: 2018-10-03

## 2018-10-03 NOTE — TELEPHONE ENCOUNTER
----- Message from Lore Taveras sent at 10/2/2018  3:17 PM CDT -----  Contact: Patient  Done. I spoke to the patient and rescheduled her appt to 10/22/18    ----- Message -----  From: Jess Guardado  Sent: 10/2/2018   9:36 AM  To: [facility] Post-Liver Transplant Non-Clinical    Needs Advice    Reason for call: Patient has concerns about het 10/8 appt with Ceci Theodore        Communication Preference: [phone]    Additional Information: n/a

## 2018-10-22 ENCOUNTER — OFFICE VISIT (OUTPATIENT)
Dept: TRANSPLANT | Facility: CLINIC | Age: 58
End: 2018-10-22
Payer: COMMERCIAL

## 2018-10-22 VITALS
HEART RATE: 89 BPM | RESPIRATION RATE: 18 BRPM | WEIGHT: 109.56 LBS | SYSTOLIC BLOOD PRESSURE: 149 MMHG | DIASTOLIC BLOOD PRESSURE: 83 MMHG | BODY MASS INDEX: 20.69 KG/M2 | OXYGEN SATURATION: 98 % | TEMPERATURE: 98 F | HEIGHT: 61 IN

## 2018-10-22 DIAGNOSIS — Z94.0 DECEASED-DONOR KIDNEY TRANSPLANT: Chronic | ICD-10-CM

## 2018-10-22 DIAGNOSIS — I10 ESSENTIAL HYPERTENSION: ICD-10-CM

## 2018-10-22 DIAGNOSIS — T86.11 ACUTE REJECTION OF KIDNEY TRANSPLANT: ICD-10-CM

## 2018-10-22 DIAGNOSIS — Z94.4 STATUS POST LIVER TRANSPLANTATION: Primary | Chronic | ICD-10-CM

## 2018-10-22 DIAGNOSIS — I26.99 OTHER PULMONARY EMBOLISM WITHOUT ACUTE COR PULMONALE, UNSPECIFIED CHRONICITY: ICD-10-CM

## 2018-10-22 DIAGNOSIS — M81.0 OSTEOPOROSIS, UNSPECIFIED OSTEOPOROSIS TYPE, UNSPECIFIED PATHOLOGICAL FRACTURE PRESENCE: ICD-10-CM

## 2018-10-22 DIAGNOSIS — Z87.01 H/O RECURRENT PNEUMONIA: ICD-10-CM

## 2018-10-22 DIAGNOSIS — D84.9 IMMUNOSUPPRESSION: Chronic | ICD-10-CM

## 2018-10-22 DIAGNOSIS — N18.30 CKD (CHRONIC KIDNEY DISEASE) STAGE 3, GFR 30-59 ML/MIN: Chronic | ICD-10-CM

## 2018-10-22 PROCEDURE — 3008F BODY MASS INDEX DOCD: CPT | Mod: CPTII,S$GLB,, | Performed by: NURSE PRACTITIONER

## 2018-10-22 PROCEDURE — 99999 PR PBB SHADOW E&M-EST. PATIENT-LVL V: CPT | Mod: PBBFAC,,, | Performed by: NURSE PRACTITIONER

## 2018-10-22 PROCEDURE — 99214 OFFICE O/P EST MOD 30 MIN: CPT | Mod: S$GLB,,, | Performed by: NURSE PRACTITIONER

## 2018-10-22 RX ORDER — TACROLIMUS 1 MG/1
CAPSULE ORAL
Qty: 120 CAPSULE | Refills: 6
Start: 2018-10-22 | End: 2019-05-02 | Stop reason: SDUPTHER

## 2018-10-22 NOTE — Clinical Note
October 22, 2018        Melissa Garcias             [facility]  [address]  [facility] [address]  Phone: [phone]   Patient: Virginia Lentz   MR Number: [id number]   YOB: 1960   Date of Visit: 10/22/2018       Dear Dr. Melissa Garcias    Thank you for referring Virginia Lentz to me for evaluation. Attached you will find relevant portions of my assessment and plan of care.    If you have questions, please do not hesitate to call me. I look forward to following Virginia Lentz along with you.    Sincerely,    Ceci Theodore, EDENILSON    Enclosure    If you would like to receive this communication electronically, please contact [email or web address] or [phone] to request [employer] access.    [employer] Link is a tool which provides read-only access to select patient information with whom you have a relationship. Its easy to use and provides real time access to review your patients record including encounter summaries, notes, results, and demographic information.    If you feel you have received this communication in error or would no longer like to receive these types of communications, please e-mail [email or web address]

## 2018-10-22 NOTE — PATIENT INSTRUCTIONS
1. Continue your labs every 3 months, follow up in clinic at least yearly   2. Call the dermatology department locally to schedule your appointment for your yearly skin exam   3. Schedule your fibroscan while you are here in [facility]     EDUCATION:  -- You have an increased risk of infection (bacterial, viral, or fungal infections) because you are on immunosuppression medications  -- schedule colonoscopy, and/or well women screenings as recommended by your primary care provider   -- Higher chance of high blood pressure, diabetes, high cholesterol and weight gain post transplant, so recommend to follow closely with your primary doctor to monitor for these conditions or worsening of these conditions. At higher risk for diabetes with rejection medications post transplant so should be screened for diabetes at least yearly by your primary provider  -- increased risk of cancer, including skin cancers. Avoid sun, wears hat when outside, use sunscreen with at least 15 spf at all times when outside, schedule yearly dermatology skin checks (at higher risk for skin cancer post transplant)  -- increased risk of kidney disease with the use of immunosuppression medications  -- DXA (bone) scan to assess for bone thinning (osteoporosis), next due yearly

## 2018-10-22 NOTE — PROGRESS NOTES
Transplant Hepatology  Liver Transplant Recipient Follow-up    Transplant Date: 2011 (Kidney), 2011 (Liver)  UNOS Native Liver Dx: Other, Specify - Hypotension, Laennec's    Virginia is here for follow up of her liver transplant.    ORGAN: LIVER  Whole or Partial: whole liver  Donor Type:  - brain death  [facility] High Risk: no  Donor CMV Status: Positive  Donor HCV Status: Negative  Donor HBcAb: Negative  Donor HBV TRICIA:   Donor HCV TRICIA:   Biliary Anastomosis:   Arterial Anatomy:   IVC reconstruction:   Portal vein status:     She has had the following complications since transplant: acute rejection, hypertension, renal insufficiency and acute kidney rejection early post op. The noted complications are well controlled.    Of note, had an episode of recidivism in  but is off alcohol now and is very positive about her long term outlook.    Also issues with recurrent right lobe pneumonia due to mechanical issue with stricturing, under local pulmonary follow up.    Subjective:     Interval History: Virginia was last seen on 2017 with Dr. Ferrell.  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  She is currently nearly 7 years post transplant.    Currently, she is doing well. Current complaints include none.    Hepatologist:Mariaelena    Of note: In July, was admitted to [facility] in [place] with pneumonia and PE, currently on Eliquis for next few weeks, managed by local pulmonologist Dr. Brenden Wilcox, reports had Chest Xray on 10/15/18, CT scan 3 weeks ago - pt reports both stable without concerns    She had an episode of recidivism but is off alcohol now and is very positive about her long term outlook.    ASA 81 mg daily - on hold while on Eliquis but will restart once Eliquis d/c'ed in near future     History of rejection: kidney rejection early on, liver transaminase levels  stable  Current Immunosuppression: currently taking Prograf 2/1, Prednisone 5 mg daily, Cellcept 750 mg BID      She had an episode of acute kidney rejection early on post op but her LFTs have generally been normal.      8/10/2018 00:00   EXT Tacrolimus Lvl 3.4     Liver allograft function: excellent on outside labs done 8/10/18     8/10/2018 00:00   EXT Albumin 3.9      8/10/2018 00:00   EXT Alkaline Phosphatase 64   EXT ALT 10   EXT AST 7      8/10/2018 00:00   EXT BilirubiN Total 0.5     Kidney function - CKD stage 3, followed by nephrology Dr. Hughes     8/10/2018 00:00   EXT Creatinine 1.49       Denies recurrent cancer    HTN -  Managed by PCP Dr. Smith Fibroscan : none, will do today     HEALTH MAINTENANCE:  1. Last DXA scan: this year per pt report, + osteoporosis, unsure of medication she is taking to treat, will report medication name to us. PCP following   2. Last dermatology skin assessment - overdue, needs to make appt   3. A1c - per PCP  4. Pap smear/ mammogram: per local GYN      Review of Systems   Constitutional: Negative for activity change, appetite change, chills, fatigue, fever and unexpected weight change.   HENT: Negative.    Eyes: Negative.    Respiratory: Negative for cough and shortness of breath.    Cardiovascular: Negative for chest pain and leg swelling.   Gastrointestinal: Negative for abdominal distention, abdominal pain, constipation, diarrhea, nausea and vomiting.   Endocrine: Negative.    Genitourinary: Negative for dysuria, flank pain and urgency.   Musculoskeletal: Negative.    Skin: Negative for rash and wound.   Allergic/Immunologic: Positive for immunocompromised state.   Neurological: Negative for dizziness, weakness and headaches.   Hematological: Negative for adenopathy. Does not bruise/bleed easily.   Psychiatric/Behavioral: Negative.        Objective:     Physical Exam   Constitutional: She is oriented to person, place, and time. She appears well-developed and  well-nourished.   HENT:   Head: Normocephalic and atraumatic.   Eyes: EOM are normal. Pupils are equal, round, and reactive to light. No scleral icterus.   Neck: Normal range of motion. Neck supple.   Cardiovascular: Normal rate, regular rhythm and normal heart sounds.   No murmur heard.  Pulmonary/Chest: Effort normal. She has no wheezes. She has no rales.   Breath sounds mildly coarse right lower and middle lobe, followed by pulmonology    Abdominal: Soft. Bowel sounds are normal. She exhibits no distension and no mass. There is no tenderness. There is no rebound and no guarding.   Musculoskeletal: Normal range of motion. She exhibits no edema or tenderness.   Lymphadenopathy:     She has no cervical adenopathy.   Neurological: She is alert and oriented to person, place, and time.   Skin: Skin is warm and dry. Capillary refill takes less than 2 seconds. No rash noted. No erythema.   Psychiatric: She has a normal mood and affect. Her behavior is normal.       Assessment/Plan:     1. Status post liver transplantation    2. -donor kidney transplant    3. CKD (chronic kidney disease) stage 3, GFR 30-59 ml/min    4. Immunosuppression    5. Acute rejection of kidney transplant    6. Osteoporosis, unspecified osteoporosis type, unspecified pathological fracture presence    7. Essential hypertension    8. H/O recurrent pneumonia    9. Other pulmonary embolism without acute cor pulmonale, unspecified chronicity        1. S/p liver and kidney transplant , liver disease due to alcohol  On ASA 81 mg daily    2. Immunosuppression - currently taking currently taking Prograf , Prednisone 5 mg daily, Cellcept 750 mg BID, last trough 3.4, liver transaminase and function excellent  --- immunosuppression stable, kidney function CKD 3    3. CKD stage 3   -- followed by nephrology Dr. Hughes    4. Health maintenance  -- yearly dermatology visit, overdue, needs to schedule visit  -- DXA scan - done yearly, followed by  PCP  -- Diabetes screening with A1c - per PCP  -- Fibroscan yearly : offered to do today     5. HTN  -- followed by PCP, Dr. Lilibeth Koehler    6. Osteoporosis  -- pt taking once weekly medication, unsure of name, will report     7. H/o acute kidney rejection  -- occurred soon after transplant  -- on Prednisone, Prograf and Cellcept     8. h/o recurrent pneumonia, recent PE  -- followed by pulmonology Dr. Wilcox   -- denies any fever, chills, SOB, cough    PLAN:  1. RTC in 1 year, labs per protocol   2. Offered Fibroscan today, then yearly - pt to call and likely schedule sometime this week while she is in town   3. Pt will call coordinator to report name of osteoporosis medication to update med card   4. F/u with PCP for osteoporosis, HTN treatment  5. F/u with nephrology for CKD 3  6. Pt to schedule dermatology visit locally for annual skin eval  7. F/u with pulmonology for h/o recurrent pneumonia, recent PE    Note routed to post-liver transplant coordinator Dr. Mariaelena werner, EDENILSON SENAOS Patient Status  Functional Status: 100% - Normal, no complaints, no evidence of disease  Physical Capacity: No Limitations

## 2018-11-19 ENCOUNTER — TELEPHONE (OUTPATIENT)
Dept: TRANSPLANT | Facility: CLINIC | Age: 58
End: 2018-11-19

## 2018-11-19 NOTE — TELEPHONE ENCOUNTER
Patient was due for lab on 11/12/18.  Called patient's lab, they report patient did not have labs drawn.  Pt reports she will have labs drawn on 11/26.

## 2018-12-12 ENCOUNTER — TELEPHONE (OUTPATIENT)
Dept: TRANSPLANT | Facility: CLINIC | Age: 58
End: 2018-12-12

## 2018-12-12 NOTE — TELEPHONE ENCOUNTER
Patient was due for lab on 11/26/18.  Called patient's lab, they report patient did not have labs drawn.  Left message on patient's voicemail requesting call to office. Letter sent.

## 2018-12-12 NOTE — LETTER
December 12, 2018    Virginia Lentz  [address] [address]          Dear Virginia Lentz:  MRN: [id number]    Your lab work was due to be drawn on 11/12/18 and rescheduled to 11/26/18.  We contacted your lab and were unable to get test results.  It is very important to get your labs drawn as scheduled.  We cannot monitor you for rejection, infections, or drug toxicity side effects without lab results.  Please call us at [phone] as soon as possible to let us know when you plan to have labs drawn.    Sincerely,      Angelic Spence RN, BSN  Your Liver Transplant Coordinator    [facility] [facility]  [address] [place] [address]  [phone]

## 2018-12-18 ENCOUNTER — TELEPHONE (OUTPATIENT)
Dept: TRANSPLANT | Facility: CLINIC | Age: 58
End: 2018-12-18

## 2018-12-18 NOTE — TELEPHONE ENCOUNTER
----- Message from Digna Lipscomb sent at 12/18/2018  4:47 PM CST -----  Pt calling to let Angelic know she had lab work completed on 12/14 @ Dr. Reyes's ofc in [place]    Call pt w/results    Pt contact [phone]

## 2018-12-19 LAB
EXT ALBUMIN: 4.4
EXT ALKALINE PHOSPHATASE: 101
EXT ALT: 8
EXT AST: 8
EXT BASOPHIL%: 1.1
EXT BILIRUBIN TOTAL: 0.7
EXT BUN: 35
EXT CALCIUM: 9.4
EXT CHLORIDE: 106
EXT CO2: 17
EXT CREATININE: 1.48 MG/DL
EXT EOSINOPHIL%: 3.9
EXT GLUCOSE: 76
EXT HEMATOCRIT: 31
EXT HEMOGLOBIN: 10.4
EXT LYMPH%: 19
EXT MONOCYTES%: 8.3
EXT PLATELETS: 371
EXT POTASSIUM: 4.6
EXT PROTEIN TOTAL: 7.2
EXT SEGS%: 67.7
EXT SODIUM: 138 MMOL/L
EXT TACROLIMUS LVL: 2.5
EXT WBC: 7.5

## 2018-12-20 ENCOUNTER — TELEPHONE (OUTPATIENT)
Dept: TRANSPLANT | Facility: CLINIC | Age: 58
End: 2018-12-20

## 2018-12-20 NOTE — TELEPHONE ENCOUNTER
----- Message from Jess Guardado sent at 12/20/2018  8:38 AM CST -----  Contact: patient  Needs Advice    Reason for call: wants to confirm if her labs were received.         Communication Preference: [phone]    Additional Information: n/a

## 2018-12-26 ENCOUNTER — TELEPHONE (OUTPATIENT)
Dept: TRANSPLANT | Facility: CLINIC | Age: 58
End: 2018-12-26

## 2018-12-26 NOTE — TELEPHONE ENCOUNTER
Notified pt that c. Diff is very contagious.  The infected family member should have limited contact with everyone.  Pt reports that sister-in-law decided to stay home until she completed treatment.   ----- Message from Luiza Bray RN sent at 12/24/2018 10:51 AM CST -----  Contact: Patient      ----- Message -----  From: Ish Lou  Sent: 12/24/2018   9:59 AM  To: [facility] Post-Kidney Transplant Clinical    Needs Advice    Reason for call: Pt states her sister-in-law has or had C Diff and is still on the antibiotics and the pt would like to know if she can be around her        Communication Preference: [phone]     Additional Information: N/A

## 2018-12-26 NOTE — LETTER
December 27, 2018    Virginia Lentz  [address] [facility] [address]          Dear Virginia Lentz:  MRN: [id number]    This is a follow up to your recent labs, your lab results were stable.  There are no medicine changes.  Please have your labs drawn again on 3/11/19.      If you cannot have your labs drawn on the scheduled date, it is your responsibility to call the transplant department to reschedule.  To reschedule or make an appointment, please as to speak to or leave a message for my assistant, Lore Hernandez, at [phone].  When leaving a message for Mary Torrez, or myself, we ask that you leave a brief message regarding your request.    Sincerely,      Angelic Spence RN, BSN  Your Liver Transplant Coordinator    [facility]  [address] [place] [phone]

## 2018-12-27 NOTE — TELEPHONE ENCOUNTER
Notified pt of stable labs.  Instructed to repeat labs on 3/11/19.  Pt verbalized understanding.  Letter sent.

## 2019-01-03 ENCOUNTER — TELEPHONE (OUTPATIENT)
Dept: TRANSPLANT | Facility: CLINIC | Age: 59
End: 2019-01-03

## 2019-01-03 NOTE — TELEPHONE ENCOUNTER
----- Message from Luiza Bray RN sent at 1/2/2019  1:35 PM CST -----  Contact: Patient      ----- Message -----  From: Ish Lou  Sent: 1/2/2019  12:45 PM  To: [facility] Post-Kidney Transplant Clinical    Needs Advice    Reason for call: Pt will be going out of [place] on [place]16[place]19 and would like to know if she need any vaccinations or anything else        Communication Preference: [phone]    Additional Information: N/A

## 2019-01-03 NOTE — TELEPHONE ENCOUNTER
----- Message from Jess Guardado sent at 1/3/2019  9:35 AM CST -----  Contact: Patient  Needs Advice    Reason for call: Patient is vacationing to the [facility] and wishes to know if its safe to travel.        Communication Preference: [phone]    Additional Information: n/a

## 2019-01-03 NOTE — TELEPHONE ENCOUNTER
----- Message from Jess Guardado sent at 1/3/2019  9:35 AM CST -----  Contact: Patient  Needs Advice    Reason for call: Patient is vacationing to the [facility] and wishes to know if its safe to travel.        Communication Preference: [phone]    Additional Information: n/a

## 2019-01-03 NOTE — TELEPHONE ENCOUNTER
Notified pt there are no contraindications for travel to [place].  Instructed to contact the [facility] travel clinic for any recent reports re: [place] or precautions for transplant patients.  Pt given contact information and verbalized understanding.

## 2019-01-04 ENCOUNTER — TELEPHONE (OUTPATIENT)
Dept: TRANSPLANT | Facility: CLINIC | Age: 59
End: 2019-01-04

## 2019-01-04 NOTE — TELEPHONE ENCOUNTER
Pt contacted travel clinic yesterday and could not get an answer re: safety with traveling.  Message sent to Dr. Finn and Dr. Luz (ID) to advise.

## 2019-01-04 NOTE — TELEPHONE ENCOUNTER
----- Message from Jess Geo sent at 1/4/2019  9:14 AM CST -----  Contact: Patient  Needs Advice    Reason for call: In reference to her traveling to the [facility].         Communication Preference: [phone]    Additional Information: n/a

## 2019-01-07 ENCOUNTER — TELEPHONE (OUTPATIENT)
Dept: TRANSPLANT | Facility: CLINIC | Age: 59
End: 2019-01-07

## 2019-01-07 NOTE — TELEPHONE ENCOUNTER
Returned call patient had abnormal pap smear as low grade will be going to have biopsy on Friday, will have cramping and spotting after.  May not get to go on trip, she wanted to know if she needed to do anything on our end to prepare for biopsy.

## 2019-01-07 NOTE — TELEPHONE ENCOUNTER
----- Message from Digna Lipscomb sent at 1/7/2019  3:11 PM CST -----  Calling to speak w/coordinator regarding gynecological biopsy that she has to complete    Pt contact [phone]

## 2019-02-04 RX ORDER — MYCOPHENOLATE MOFETIL 250 MG/1
CAPSULE ORAL
Qty: 180 CAPSULE | Refills: 6 | Status: SHIPPED | OUTPATIENT
Start: 2019-02-04 | End: 2019-10-07 | Stop reason: SDUPTHER

## 2019-03-14 ENCOUNTER — NURSE TRIAGE (OUTPATIENT)
Dept: ADMINISTRATIVE | Facility: CLINIC | Age: 59
End: 2019-03-14

## 2019-03-14 NOTE — TELEPHONE ENCOUNTER
Liver/Kidney txp 11/29/11   dx earlier today with Type A flu, he began with symptoms around Tuesday /Wednesday. He was placed xofluza, newer antiviral.  She is wondering if she should be on an antiviral.  Paged Dr. Amy Pritchett, transplant hepatologist, awaiting response    Reason for Disposition   [1] Influenza EXPOSURE (Close Contact) within last 72 hours (3 days) AND [2] exposed person is HIGH RISK (e.g., age > 64 years, pregnant, HIV+, chronic medical condition)    Protocols used: INFLUENZA EXPOSURE-A-

## 2019-03-15 ENCOUNTER — TELEPHONE (OUTPATIENT)
Dept: TRANSPLANT | Facility: CLINIC | Age: 59
End: 2019-03-15

## 2019-03-15 NOTE — TELEPHONE ENCOUNTER
S/w Dr. Burdick, she advised to call in Tamiflu 75 mg po bidx 5 days, #10, no refills.  Called in to [facility] on pt's profile.  Called pt to inform her.

## 2019-03-15 NOTE — TELEPHONE ENCOUNTER
----- Message from Yasmin Oropeza sent at 3/15/2019  8:44 AM CDT -----  Contact: Self- [phone]  Pt called to speak with coordinator regarding the rx Tamaflu- has questions about the dosage amount- please contact pt at [phone]

## 2019-04-02 ENCOUNTER — TELEPHONE (OUTPATIENT)
Dept: TRANSPLANT | Facility: CLINIC | Age: 59
End: 2019-04-02

## 2019-04-02 NOTE — TELEPHONE ENCOUNTER
----- Message from Lorin Huerta sent at 4/2/2019  9:25 AM CDT -----  Contact: Patient   Needs Advice    Reason for call: Patient states she had labs done on yesterday         Communication Preference: [phone]    Additional Information: n/a

## 2019-04-04 LAB
EXT ALBUMIN: 3.9
EXT ALKALINE PHOSPHATASE: 58
EXT ALT: 3
EXT AST: 5
EXT BASOPHIL%: 0.5
EXT BILIRUBIN TOTAL: 0.2
EXT BUN: 32
EXT CALCIUM: 8.4
EXT CHLORIDE: 100
EXT CO2: 23
EXT CREATININE: 1.46 MG/DL
EXT EOSINOPHIL%: 1
EXT GLUCOSE: 146
EXT HEMATOCRIT: 30.8
EXT HEMOGLOBIN: 10
EXT LYMPH%: 11.1
EXT MONOCYTES%: 5.5
EXT PLATELETS: 240
EXT POTASSIUM: 4.7
EXT PROTEIN TOTAL: 6.6
EXT SEGS%: 81.9
EXT SODIUM: 132 MMOL/L
EXT TACROLIMUS LVL: 4.5
EXT WBC: 5.8

## 2019-04-05 ENCOUNTER — TELEPHONE (OUTPATIENT)
Dept: TRANSPLANT | Facility: CLINIC | Age: 59
End: 2019-04-05

## 2019-04-05 NOTE — LETTER
April 5, 2019    Virginia Lentz  [address] [address]          Dear Virginia Lentz:  MRN: [id number]    This is a follow up to your recent labs, your lab results were stable.  There are no medicine changes.  Please have your labs drawn again on 6/24/19.      If you cannot have your labs drawn on the scheduled date, it is your responsibility to call the transplant department to reschedule.  To reschedule or make an appointment, please as to speak to or leave a message for my assistant, Angelia Torrez or Gogo, at [phone].  When leaving a message for Angelia Torrez Angela or myself, we ask that you leave a brief message regarding your request.    Sincerely,      Angelic Spence RN, BSN  Your Liver Transplant Coordinator    [facility] [place]  [address] [place] [address]  [phone]

## 2019-05-02 RX ORDER — TACROLIMUS 1 MG/1
CAPSULE ORAL
Qty: 120 CAPSULE | Refills: 6 | Status: SHIPPED | OUTPATIENT
Start: 2019-05-02 | End: 2019-10-18 | Stop reason: DRUGHIGH

## 2019-06-28 ENCOUNTER — TELEPHONE (OUTPATIENT)
Dept: TRANSPLANT | Facility: CLINIC | Age: 59
End: 2019-06-28

## 2019-07-03 ENCOUNTER — TELEPHONE (OUTPATIENT)
Dept: TRANSPLANT | Facility: CLINIC | Age: 59
End: 2019-07-03

## 2019-07-05 ENCOUNTER — TELEPHONE (OUTPATIENT)
Dept: TRANSPLANT | Facility: CLINIC | Age: 59
End: 2019-07-05

## 2019-07-15 LAB
EXT ALBUMIN: 4.1
EXT ALKALINE PHOSPHATASE: 65
EXT ALT: 3
EXT AST: 5
EXT BASOPHIL%: 0.7
EXT BILIRUBIN TOTAL: 0.4
EXT BUN: 39
EXT CALCIUM: 8.9
EXT CHLORIDE: 103
EXT CO2: 22
EXT CREATININE: 1.69 MG/DL
EXT EOSINOPHIL%: 0.7
EXT GLUCOSE: 89
EXT HEMATOCRIT: 31.7
EXT HEMOGLOBIN: 10
EXT LYMPH%: 13.3
EXT MONOCYTES%: 9.2
EXT PLATELETS: 241
EXT POTASSIUM: 5.7
EXT PROTEIN TOTAL: 6.6
EXT SEGS%: 76.1
EXT SODIUM: 133 MMOL/L
EXT TACROLIMUS LVL: 4.5
EXT WBC: 5.9

## 2019-07-18 ENCOUNTER — TELEPHONE (OUTPATIENT)
Dept: TRANSPLANT | Facility: CLINIC | Age: 59
End: 2019-07-18

## 2019-07-18 NOTE — TELEPHONE ENCOUNTER
Called pt to discuss.  Instructed to repeat potassium level tomorrow.  Pt verbalized understanding.

## 2019-07-18 NOTE — TELEPHONE ENCOUNTER
----- Message from Fabien Montana MD sent at 7/15/2019 11:54 AM CDT -----  Please follow hyperkalemia protocol

## 2019-08-05 ENCOUNTER — TELEPHONE (OUTPATIENT)
Dept: TRANSPLANT | Facility: CLINIC | Age: 59
End: 2019-08-05

## 2019-08-05 LAB
EXT MAGNESIUM: 1.6
EXT POTASSIUM: 4.6
EXT VIT D 25 HYDROXY: 18

## 2019-08-05 NOTE — LETTER
August 5, 2019    Virginia Lentz  [address] [address]          Dear Virginia Lentz:  MRN: [id number]    This is a follow up to your recent labs, your lab results were stable.  There are no medicine changes.  Please have your labs drawn again on 9/30/19.      If you cannot have your labs drawn on the scheduled date, it is your responsibility to call the transplant department to reschedule.  To reschedule or make an appointment, please as to speak to or leave a message for my assistant, Angelia Torrez or Gogo, at [phone].  When leaving a message for Angelia Torrez Angela or myself, we ask that you leave a brief message regarding your request.    Sincerely,      Angelic Spence RN, BSN  Your Liver Transplant Coordinator    [facility] [place]  [address] [place] [phone]  [phone]

## 2019-08-08 ENCOUNTER — TELEPHONE (OUTPATIENT)
Dept: TRANSPLANT | Facility: CLINIC | Age: 59
End: 2019-08-08

## 2019-08-08 NOTE — LETTER
August 8, 2019    Virginia Lentz  [address] [facility] [address]          Dear Virginia Lentz:  MRN: [id number]    This is a follow up to your recent labs, your potassium level is stable.  There are no medicine changes.  Please have your labs drawn again on 9/30/19 as previously scheduled.      If you cannot have your labs drawn on the scheduled date, it is your responsibility to call the transplant department to reschedule.  To reschedule or make an appointment, please as to speak to or leave a message for my assistant, Angelia Torrez or Gogo, at [phone].  When leaving a message for Angelia Torrez Angela or myself, we ask that you leave a brief message regarding your request.    Sincerely,      Angelic Spence RN, BSN  Your Liver Transplant Coordinator    [facility] [place]  [address] [place] [address]  [phone]

## 2019-09-20 ENCOUNTER — TELEPHONE (OUTPATIENT)
Dept: TRANSPLANT | Facility: CLINIC | Age: 59
End: 2019-09-20

## 2019-09-20 NOTE — TELEPHONE ENCOUNTER
----- Message from Tess Foreman sent at 9/20/2019 12:00 PM CDT -----  Contact:    Pt  [phone]  Pt called stating that it's an emergency the she speak with her Dr in regards to recent labs result for her GYN DrRaffaele.  Give the pt a call   back to discuss

## 2019-09-20 NOTE — TELEPHONE ENCOUNTER
Returned call patient went to do follow up PAP smear and had a spot in her vaginal area.  The doctor did labs and come back Herpes 1 and 2.  I told her is was ok to take the Valtrex for treatment.

## 2019-09-30 ENCOUNTER — TELEPHONE (OUTPATIENT)
Dept: TRANSPLANT | Facility: CLINIC | Age: 59
End: 2019-09-30

## 2019-10-03 ENCOUNTER — TELEPHONE (OUTPATIENT)
Dept: TRANSPLANT | Facility: CLINIC | Age: 59
End: 2019-10-03

## 2019-10-07 RX ORDER — MYCOPHENOLATE MOFETIL 250 MG/1
CAPSULE ORAL
Qty: 180 CAPSULE | Refills: 0 | Status: SHIPPED | OUTPATIENT
Start: 2019-10-07 | End: 2019-10-21 | Stop reason: SDUPTHER

## 2019-10-10 ENCOUNTER — TELEPHONE (OUTPATIENT)
Dept: TRANSPLANT | Facility: CLINIC | Age: 59
End: 2019-10-10

## 2019-10-14 LAB
EXT ALBUMIN: 3.6
EXT ALKALINE PHOSPHATASE: 54
EXT ALT: 3
EXT AST: 8
EXT BASOPHIL%: 0.7
EXT BILIRUBIN TOTAL: 0.4
EXT BUN: 36
EXT CALCIUM: 8.1
EXT CHLORIDE: 103
EXT CO2: 22
EXT CREATININE: 1.57 MG/DL
EXT EOSINOPHIL%: 0.5
EXT GLUCOSE: 98
EXT HEMATOCRIT: 31.2
EXT HEMOGLOBIN: 10.1
EXT LYMPH%: 13.3
EXT MONOCYTES%: 6.9
EXT PLATELETS: 277
EXT POTASSIUM: 4.1
EXT PROTEIN TOTAL: 6.4
EXT SEGS%: 78.6
EXT SODIUM: 134 MMOL/L
EXT TACROLIMUS LVL: 3.1
EXT WBC: 7.7

## 2019-10-18 ENCOUNTER — TELEPHONE (OUTPATIENT)
Dept: TRANSPLANT | Facility: CLINIC | Age: 59
End: 2019-10-18

## 2019-10-18 NOTE — TELEPHONE ENCOUNTER
----- Message from Ruben Ferrell MD sent at 10/14/2019  3:01 PM CDT -----  Results reviewed  Increase tac to 3/2  Does she have a local nephrologist?

## 2019-10-18 NOTE — TELEPHONE ENCOUNTER
----- Message from Martha Pleitez sent at 10/18/2019  4:40 PM CDT -----  Pt returning Angelic call    Pt contact [phone]

## 2019-10-19 RX ORDER — TACROLIMUS 1 MG/1
CAPSULE ORAL
Qty: 150 CAPSULE | Refills: 11 | Status: SHIPPED | OUTPATIENT
Start: 2019-10-19 | End: 2019-11-25 | Stop reason: SDUPTHER

## 2019-10-21 RX ORDER — MYCOPHENOLATE MOFETIL 250 MG/1
CAPSULE ORAL
Qty: 180 CAPSULE | Refills: 0 | Status: SHIPPED | OUTPATIENT
Start: 2019-10-21 | End: 2019-12-16 | Stop reason: SDUPTHER

## 2019-10-31 ENCOUNTER — TELEPHONE (OUTPATIENT)
Dept: TRANSPLANT | Facility: CLINIC | Age: 59
End: 2019-10-31

## 2019-10-31 NOTE — TELEPHONE ENCOUNTER
----- Message from Ish Lou sent at 10/31/2019  1:17 PM CDT -----  Type:  Needs Medical Advice    Who Called: Pt    Would the patient rather a call back or a response via [facility]? Callback    Best Call Back Number: [phone]    Additional Information: Speak w/ coordinator and notify her that she is in the hospital in     [place]

## 2019-10-31 NOTE — TELEPHONE ENCOUNTER
Returned call patient report she is in the hospital with Pneumonia and elevated creatinine. [facility].    Michael her Nephrologist.

## 2019-10-31 NOTE — TELEPHONE ENCOUNTER
----- Message from Ish Lou sent at 10/31/2019  1:17 PM CDT -----  Type:  Needs Medical Advice    Who Called: Pt    Would the patient rather a call back or a response via [facility]? Callback    Best Call Back Number: [phone]    Additional Information: Speak w/ coordinator and notify her that she is in the hospital in     [place]

## 2019-11-04 ENCOUNTER — TELEPHONE (OUTPATIENT)
Dept: TRANSPLANT | Facility: CLINIC | Age: 59
End: 2019-11-04

## 2019-11-04 NOTE — TELEPHONE ENCOUNTER
Spoke to pt who states she has been discharged from the hospital. She will plan to repeat labs on 11/6 or 11/7. Pt also states she had an ultrasound of kidney that was done and was abnormal. Called Dr. Ozuna ( local nephrologist) office and left message with Ashley HERNANDEZ for report to be faxed to office.

## 2019-11-04 NOTE — TELEPHONE ENCOUNTER
----- Message from Gogo Bridges MA sent at 11/4/2019 10:35 AM CST -----  Contact: Pt  Thank you!    ----- Message -----  From: Irwin Mclain  Sent: 11/4/2019  10:04 AM CST  To: [facility] Post-Liver Transplant Non-Clinical    Pt need to make appt and speak with you about hospital stay.    Pt# [phone]

## 2019-11-07 ENCOUNTER — TELEPHONE (OUTPATIENT)
Dept: TRANSPLANT | Facility: CLINIC | Age: 59
End: 2019-11-07

## 2019-11-08 ENCOUNTER — PATIENT MESSAGE (OUTPATIENT)
Dept: TRANSPLANT | Facility: CLINIC | Age: 59
End: 2019-11-08

## 2019-11-08 ENCOUNTER — TELEPHONE (OUTPATIENT)
Dept: TRANSPLANT | Facility: CLINIC | Age: 59
End: 2019-11-08

## 2019-11-08 NOTE — TELEPHONE ENCOUNTER
----- Message from Sumit Pleitez sent at 11/8/2019  4:23 PM CST -----  Pt calling to speak to Angelic about her health she stated its urgent      Pt contact [phone]

## 2019-11-08 NOTE — TELEPHONE ENCOUNTER
Returned call to discuss.  Message previously sent to Dr. Ferrell. Pt will have labs on Monday and f/u.

## 2019-11-11 ENCOUNTER — TELEPHONE (OUTPATIENT)
Dept: TRANSPLANT | Facility: CLINIC | Age: 59
End: 2019-11-11

## 2019-11-11 NOTE — TELEPHONE ENCOUNTER
Spoke with pt. States she would like a call from Dr. Pinedo's office regarding her Cr from today's labs. Advised that we don not have the results and that she should call his office since his is the ordering provider.     Emailed pt lab order for liver tx. Also called Dr. Koehler's office and was able to get the correct fax number. Lab order also faxed to Dr. Koehler.

## 2019-11-11 NOTE — TELEPHONE ENCOUNTER
----- Message from Arti Lora sent at 11/11/2019  2:57 PM CST -----  Contact: pt [phone]  Pt states that lab orders not received and she wants to speak with you regarding it. Please call

## 2019-11-12 ENCOUNTER — PATIENT MESSAGE (OUTPATIENT)
Dept: TRANSPLANT | Facility: CLINIC | Age: 59
End: 2019-11-12

## 2019-11-12 ENCOUNTER — TELEPHONE (OUTPATIENT)
Dept: TRANSPLANT | Facility: CLINIC | Age: 59
End: 2019-11-12

## 2019-11-12 NOTE — TELEPHONE ENCOUNTER
----- Message from Robert Osorio sent at 11/12/2019  2:33 PM CST -----  Contact: pt @ [phone]  Pt is calling to discuss lab completed today

## 2019-11-13 ENCOUNTER — TELEPHONE (OUTPATIENT)
Dept: TRANSPLANT | Facility: CLINIC | Age: 59
End: 2019-11-13

## 2019-11-13 LAB
EXT ALBUMIN: 3.1
EXT ALKALINE PHOSPHATASE: 58
EXT ALT: 6
EXT AST: 8
EXT BASOPHIL%: 0.1
EXT BILIRUBIN TOTAL: 0.4
EXT BK VIRUS DNA QN PCR: NOT DETECTED
EXT BUN: 50
EXT CALCIUM: 8.6
EXT CHLORIDE: 104
EXT CO2: 23
EXT CREATININE: 1.81 MG/DL
EXT EOSINOPHIL%: 0
EXT GLUCOSE: 106
EXT HEMATOCRIT: 29.8
EXT HEMOGLOBIN: 9.4
EXT LYMPH%: 6.8
EXT MAGNESIUM: 1.3
EXT MONOCYTES%: 5.3
EXT PLATELETS: 448
EXT POTASSIUM: 4.9
EXT PROTEIN TOTAL: 5.5
EXT SEGS%: 87.8
EXT SODIUM: 136 MMOL/L
EXT TACROLIMUS LVL: 5.8
EXT VIT D 25 HYDROXY: 14
EXT WBC: 12.3

## 2019-11-13 NOTE — TELEPHONE ENCOUNTER
----- Message from Qi Peñaloza RN sent at 11/13/2019  8:55 AM CST -----  Contact: Dr. Lilibeth Koehler's office      ----- Message -----  From: Ashley Foreman  Sent: 11/13/2019   8:38 AM CST  To: [facility] Post-Kidney Transplant Clinical    Calling about lab results that were faxed to us and need to be addressed on today.  She also mentions that they did not give the patient the results.  Please address.  Myrna can be reached at [phone]

## 2019-11-15 ENCOUNTER — PATIENT MESSAGE (OUTPATIENT)
Dept: TRANSPLANT | Facility: CLINIC | Age: 59
End: 2019-11-15

## 2019-11-15 ENCOUNTER — TELEPHONE (OUTPATIENT)
Dept: TRANSPLANT | Facility: CLINIC | Age: 59
End: 2019-11-15

## 2019-11-19 ENCOUNTER — TELEPHONE (OUTPATIENT)
Dept: TRANSPLANT | Facility: CLINIC | Age: 59
End: 2019-11-19

## 2019-11-19 NOTE — TELEPHONE ENCOUNTER
Spoke with Mrs Lentz's PCP- she has shingles  Will start Valtrex at a reduced dose because of her lower kidney function 1 g daily for 10 days and will ask her to stop her MMF for now.    She is coming to see us next week.

## 2019-11-20 ENCOUNTER — TELEPHONE (OUTPATIENT)
Dept: TRANSPLANT | Facility: CLINIC | Age: 59
End: 2019-11-20

## 2019-11-20 ENCOUNTER — PATIENT MESSAGE (OUTPATIENT)
Dept: TRANSPLANT | Facility: CLINIC | Age: 59
End: 2019-11-20

## 2019-11-20 LAB
EXT ALBUMIN: 3.6
EXT ALKALINE PHOSPHATASE: 61
EXT ALT: 4
EXT AST: 8
EXT BASOPHIL%: 0.4
EXT BILIRUBIN TOTAL: 0.5
EXT BUN: 38
EXT CALCIUM: 8.3
EXT CHLORIDE: 102
EXT CO2: 23
EXT CREATININE: 1.72 MG/DL
EXT EOSINOPHIL%: 1.7
EXT GLUCOSE: 70
EXT HEMATOCRIT: 27.6
EXT HEMOGLOBIN: 9.2
EXT LYMPH%: 19.5
EXT MONOCYTES%: 10.3
EXT PLATELETS: 258
EXT POTASSIUM: 4.9
EXT PROTEIN TOTAL: 6
EXT SEGS%: 68.1
EXT SODIUM: 136 MMOL/L
EXT TACROLIMUS LVL: 7.1
EXT WBC: 5.4

## 2019-11-25 ENCOUNTER — INFUSION (OUTPATIENT)
Dept: INFECTIOUS DISEASES | Facility: HOSPITAL | Age: 59
End: 2019-11-25
Attending: INTERNAL MEDICINE
Payer: COMMERCIAL

## 2019-11-25 ENCOUNTER — LAB VISIT (OUTPATIENT)
Dept: LAB | Facility: HOSPITAL | Age: 59
End: 2019-11-25
Payer: COMMERCIAL

## 2019-11-25 ENCOUNTER — OFFICE VISIT (OUTPATIENT)
Dept: TRANSPLANT | Facility: CLINIC | Age: 59
End: 2019-11-25
Payer: COMMERCIAL

## 2019-11-25 ENCOUNTER — TELEPHONE (OUTPATIENT)
Dept: TRANSPLANT | Facility: CLINIC | Age: 59
End: 2019-11-25

## 2019-11-25 ENCOUNTER — TELEPHONE (OUTPATIENT)
Dept: HEPATOLOGY | Facility: CLINIC | Age: 59
End: 2019-11-25

## 2019-11-25 VITALS
HEIGHT: 60 IN | SYSTOLIC BLOOD PRESSURE: 147 MMHG | DIASTOLIC BLOOD PRESSURE: 69 MMHG | WEIGHT: 115.94 LBS | OXYGEN SATURATION: 95 % | HEART RATE: 83 BPM | TEMPERATURE: 98 F | BODY MASS INDEX: 22.76 KG/M2 | RESPIRATION RATE: 16 BRPM

## 2019-11-25 VITALS
RESPIRATION RATE: 16 BRPM | WEIGHT: 114.19 LBS | HEIGHT: 60 IN | SYSTOLIC BLOOD PRESSURE: 182 MMHG | BODY MASS INDEX: 22.42 KG/M2 | TEMPERATURE: 98 F | DIASTOLIC BLOOD PRESSURE: 92 MMHG | OXYGEN SATURATION: 96 % | HEART RATE: 100 BPM

## 2019-11-25 VITALS
SYSTOLIC BLOOD PRESSURE: 164 MMHG | WEIGHT: 114.44 LBS | RESPIRATION RATE: 16 BRPM | BODY MASS INDEX: 22.47 KG/M2 | HEIGHT: 60 IN | TEMPERATURE: 98 F | HEART RATE: 100 BPM | OXYGEN SATURATION: 96 % | DIASTOLIC BLOOD PRESSURE: 85 MMHG

## 2019-11-25 DIAGNOSIS — N17.9 AKI (ACUTE KIDNEY INJURY): Primary | ICD-10-CM

## 2019-11-25 DIAGNOSIS — Z94.0 DECEASED-DONOR KIDNEY TRANSPLANT: Chronic | ICD-10-CM

## 2019-11-25 DIAGNOSIS — M81.0 OSTEOPOROSIS, UNSPECIFIED OSTEOPOROSIS TYPE, UNSPECIFIED PATHOLOGICAL FRACTURE PRESENCE: ICD-10-CM

## 2019-11-25 DIAGNOSIS — N17.9 AKI (ACUTE KIDNEY INJURY): ICD-10-CM

## 2019-11-25 DIAGNOSIS — Z94.0 DECEASED-DONOR KIDNEY TRANSPLANT: Primary | ICD-10-CM

## 2019-11-25 DIAGNOSIS — N18.30 CKD (CHRONIC KIDNEY DISEASE) STAGE 3, GFR 30-59 ML/MIN: Chronic | ICD-10-CM

## 2019-11-25 DIAGNOSIS — Z94.4 STATUS POST LIVER TRANSPLANTATION: Chronic | ICD-10-CM

## 2019-11-25 DIAGNOSIS — R21 RASH: ICD-10-CM

## 2019-11-25 DIAGNOSIS — I10 ESSENTIAL HYPERTENSION: ICD-10-CM

## 2019-11-25 DIAGNOSIS — Z94.4 STATUS POST LIVER TRANSPLANTATION: Primary | Chronic | ICD-10-CM

## 2019-11-25 DIAGNOSIS — Z87.01 H/O RECURRENT PNEUMONIA: ICD-10-CM

## 2019-11-25 DIAGNOSIS — D84.9 IMMUNOSUPPRESSION: Chronic | ICD-10-CM

## 2019-11-25 LAB
ALBUMIN SERPL BCP-MCNC: 3.8 G/DL (ref 3.5–5.2)
ALP SERPL-CCNC: 112 U/L (ref 55–135)
ALT SERPL W/O P-5'-P-CCNC: 11 U/L (ref 10–44)
ANION GAP SERPL CALC-SCNC: 9 MMOL/L (ref 8–16)
AST SERPL-CCNC: 11 U/L (ref 10–40)
BASOPHILS # BLD AUTO: 0.03 K/UL (ref 0–0.2)
BASOPHILS NFR BLD: 0.5 % (ref 0–1.9)
BILIRUB SERPL-MCNC: 0.4 MG/DL (ref 0.1–1)
BUN SERPL-MCNC: 38 MG/DL (ref 6–20)
CALCIUM SERPL-MCNC: 9.7 MG/DL (ref 8.7–10.5)
CHLORIDE SERPL-SCNC: 99 MMOL/L (ref 95–110)
CO2 SERPL-SCNC: 29 MMOL/L (ref 23–29)
CREAT SERPL-MCNC: 2.3 MG/DL (ref 0.5–1.4)
DIFFERENTIAL METHOD: ABNORMAL
EOSINOPHIL # BLD AUTO: 0.1 K/UL (ref 0–0.5)
EOSINOPHIL NFR BLD: 2 % (ref 0–8)
ERYTHROCYTE [DISTWIDTH] IN BLOOD BY AUTOMATED COUNT: 16.1 % (ref 11.5–14.5)
EST. GFR  (AFRICAN AMERICAN): 25.8 ML/MIN/1.73 M^2
EST. GFR  (NON AFRICAN AMERICAN): 22.4 ML/MIN/1.73 M^2
GLUCOSE SERPL-MCNC: 99 MG/DL (ref 70–110)
HCT VFR BLD AUTO: 34.6 % (ref 37–48.5)
HGB BLD-MCNC: 10.6 G/DL (ref 12–16)
IMM GRANULOCYTES # BLD AUTO: 0.02 K/UL (ref 0–0.04)
IMM GRANULOCYTES NFR BLD AUTO: 0.3 % (ref 0–0.5)
LYMPHOCYTES # BLD AUTO: 1.3 K/UL (ref 1–4.8)
LYMPHOCYTES NFR BLD: 19.8 % (ref 18–48)
MCH RBC QN AUTO: 28.3 PG (ref 27–31)
MCHC RBC AUTO-ENTMCNC: 30.6 G/DL (ref 32–36)
MCV RBC AUTO: 92 FL (ref 82–98)
MONOCYTES # BLD AUTO: 0.7 K/UL (ref 0.3–1)
MONOCYTES NFR BLD: 11 % (ref 4–15)
NEUTROPHILS # BLD AUTO: 4.4 K/UL (ref 1.8–7.7)
NEUTROPHILS NFR BLD: 66.4 % (ref 38–73)
NRBC BLD-RTO: 0 /100 WBC
PLATELET # BLD AUTO: 327 K/UL (ref 150–350)
PMV BLD AUTO: 9.6 FL (ref 9.2–12.9)
POTASSIUM SERPL-SCNC: 4.6 MMOL/L (ref 3.5–5.1)
PROT SERPL-MCNC: 7.7 G/DL (ref 6–8.4)
RBC # BLD AUTO: 3.75 M/UL (ref 4–5.4)
SODIUM SERPL-SCNC: 137 MMOL/L (ref 136–145)
TACROLIMUS BLD-MCNC: 9.9 NG/ML (ref 5–15)
WBC # BLD AUTO: 6.65 K/UL (ref 3.9–12.7)

## 2019-11-25 PROCEDURE — 99214 OFFICE O/P EST MOD 30 MIN: CPT | Mod: S$GLB,,, | Performed by: NURSE PRACTITIONER

## 2019-11-25 PROCEDURE — 3077F PR MOST RECENT SYSTOLIC BLOOD PRESSURE >= 140 MM HG: ICD-10-PCS | Mod: CPTII,S$GLB,, | Performed by: INTERNAL MEDICINE

## 2019-11-25 PROCEDURE — 99205 OFFICE O/P NEW HI 60 MIN: CPT | Mod: S$GLB,,, | Performed by: INTERNAL MEDICINE

## 2019-11-25 PROCEDURE — 99999 PR PBB SHADOW E&M-EST. PATIENT-LVL III: CPT | Mod: PBBFAC,,, | Performed by: INTERNAL MEDICINE

## 2019-11-25 PROCEDURE — 3078F DIAST BP <80 MM HG: CPT | Mod: CPTII,S$GLB,, | Performed by: NURSE PRACTITIONER

## 2019-11-25 PROCEDURE — 63600175 PHARM REV CODE 636 W HCPCS: Performed by: INTERNAL MEDICINE

## 2019-11-25 PROCEDURE — 3008F BODY MASS INDEX DOCD: CPT | Mod: CPTII,S$GLB,, | Performed by: NURSE PRACTITIONER

## 2019-11-25 PROCEDURE — 50200 PR BIOPSY OF KIDNEY,PERCUTANEOUS: ICD-10-PCS | Mod: S$GLB,,, | Performed by: INTERNAL MEDICINE

## 2019-11-25 PROCEDURE — 96360 HYDRATION IV INFUSION INIT: CPT

## 2019-11-25 PROCEDURE — 99999 PR PBB SHADOW E&M-EST. PATIENT-LVL III: ICD-10-PCS | Mod: PBBFAC,,, | Performed by: INTERNAL MEDICINE

## 2019-11-25 PROCEDURE — 3008F BODY MASS INDEX DOCD: CPT | Mod: CPTII,S$GLB,, | Performed by: INTERNAL MEDICINE

## 2019-11-25 PROCEDURE — 80197 ASSAY OF TACROLIMUS: CPT

## 2019-11-25 PROCEDURE — 85025 COMPLETE CBC W/AUTO DIFF WBC: CPT

## 2019-11-25 PROCEDURE — 50200 RENAL BIOPSY PERQ: CPT | Mod: S$GLB,,, | Performed by: INTERNAL MEDICINE

## 2019-11-25 PROCEDURE — 3008F PR BODY MASS INDEX (BMI) DOCUMENTED: ICD-10-PCS | Mod: CPTII,S$GLB,, | Performed by: INTERNAL MEDICINE

## 2019-11-25 PROCEDURE — 3077F PR MOST RECENT SYSTOLIC BLOOD PRESSURE >= 140 MM HG: ICD-10-PCS | Mod: CPTII,S$GLB,, | Performed by: NURSE PRACTITIONER

## 2019-11-25 PROCEDURE — 99205 PR OFFICE/OUTPT VISIT, NEW, LEVL V, 60-74 MIN: ICD-10-PCS | Mod: S$GLB,,, | Performed by: INTERNAL MEDICINE

## 2019-11-25 PROCEDURE — 3077F SYST BP >= 140 MM HG: CPT | Mod: CPTII,S$GLB,, | Performed by: INTERNAL MEDICINE

## 2019-11-25 PROCEDURE — 3077F SYST BP >= 140 MM HG: CPT | Mod: CPTII,S$GLB,, | Performed by: NURSE PRACTITIONER

## 2019-11-25 PROCEDURE — 3008F PR BODY MASS INDEX (BMI) DOCUMENTED: ICD-10-PCS | Mod: CPTII,S$GLB,, | Performed by: NURSE PRACTITIONER

## 2019-11-25 PROCEDURE — 3079F PR MOST RECENT DIASTOLIC BLOOD PRESSURE 80-89 MM HG: ICD-10-PCS | Mod: CPTII,S$GLB,, | Performed by: INTERNAL MEDICINE

## 2019-11-25 PROCEDURE — 99214 PR OFFICE/OUTPT VISIT, EST, LEVL IV, 30-39 MIN: ICD-10-PCS | Mod: S$GLB,,, | Performed by: NURSE PRACTITIONER

## 2019-11-25 PROCEDURE — 3078F PR MOST RECENT DIASTOLIC BLOOD PRESSURE < 80 MM HG: ICD-10-PCS | Mod: CPTII,S$GLB,, | Performed by: NURSE PRACTITIONER

## 2019-11-25 PROCEDURE — 99999 PR PBB SHADOW E&M-EST. PATIENT-LVL V: ICD-10-PCS | Mod: PBBFAC,,, | Performed by: NURSE PRACTITIONER

## 2019-11-25 PROCEDURE — 99999 PR PBB SHADOW E&M-EST. PATIENT-LVL V: CPT | Mod: PBBFAC,,, | Performed by: NURSE PRACTITIONER

## 2019-11-25 PROCEDURE — 3079F DIAST BP 80-89 MM HG: CPT | Mod: CPTII,S$GLB,, | Performed by: INTERNAL MEDICINE

## 2019-11-25 PROCEDURE — 80053 COMPREHEN METABOLIC PANEL: CPT

## 2019-11-25 PROCEDURE — 36415 COLL VENOUS BLD VENIPUNCTURE: CPT

## 2019-11-25 PROCEDURE — 96361 HYDRATE IV INFUSION ADD-ON: CPT

## 2019-11-25 RX ORDER — SODIUM CHLORIDE 0.9 % (FLUSH) 0.9 %
10 SYRINGE (ML) INJECTION
Status: CANCELLED | OUTPATIENT
Start: 2019-11-25

## 2019-11-25 RX ORDER — FLUOXETINE HYDROCHLORIDE 20 MG/1
1 CAPSULE ORAL DAILY
Refills: 1 | COMMUNITY
Start: 2019-10-29 | End: 2022-01-24

## 2019-11-25 RX ORDER — CALCITONIN SALMON 200 [IU]/.09ML
1 SPRAY, METERED NASAL DAILY PRN
Status: ON HOLD | COMMUNITY
Start: 2019-08-13 | End: 2019-12-06

## 2019-11-25 RX ORDER — ACETYLCYSTEINE 200 MG/ML
SOLUTION ORAL; RESPIRATORY (INHALATION) 2 TIMES DAILY
Refills: 3 | COMMUNITY
Start: 2019-11-20 | End: 2021-05-07 | Stop reason: SDUPTHER

## 2019-11-25 RX ORDER — BUDESONIDE AND FORMOTEROL FUMARATE DIHYDRATE 160; 4.5 UG/1; UG/1
2 AEROSOL RESPIRATORY (INHALATION) 2 TIMES DAILY
Status: ON HOLD | COMMUNITY
End: 2019-12-06

## 2019-11-25 RX ORDER — IPRATROPIUM BROMIDE AND ALBUTEROL SULFATE 2.5; .5 MG/3ML; MG/3ML
SOLUTION RESPIRATORY (INHALATION) 3 TIMES DAILY PRN
COMMUNITY
End: 2021-05-07 | Stop reason: SDUPTHER

## 2019-11-25 RX ORDER — AMLODIPINE BESYLATE 5 MG/1
5 TABLET ORAL DAILY
Qty: 30 TABLET | Refills: 0 | Status: SHIPPED | OUTPATIENT
Start: 2019-11-25 | End: 2022-01-24

## 2019-11-25 RX ORDER — PROMETHAZINE HYDROCHLORIDE 12.5 MG/1
1 TABLET ORAL EVERY 6 HOURS PRN
Refills: 0 | Status: ON HOLD | COMMUNITY
Start: 2019-10-28 | End: 2019-12-07 | Stop reason: SDUPTHER

## 2019-11-25 RX ORDER — VALACYCLOVIR HYDROCHLORIDE 1 G/1
1 TABLET, FILM COATED ORAL DAILY
Refills: 1 | Status: ON HOLD | COMMUNITY
Start: 2019-11-19 | End: 2019-12-06

## 2019-11-25 RX ORDER — MONTELUKAST SODIUM 10 MG/1
1 TABLET ORAL DAILY PRN
Status: ON HOLD | COMMUNITY
Start: 2019-09-23 | End: 2019-12-05

## 2019-11-25 RX ORDER — TACROLIMUS 1 MG/1
CAPSULE ORAL
Qty: 120 CAPSULE | Refills: 3 | Status: SHIPPED | OUTPATIENT
Start: 2019-11-25 | End: 2019-12-08

## 2019-11-25 RX ADMIN — SODIUM CHLORIDE 1500 ML: 0.9 INJECTION, SOLUTION INTRAVENOUS at 02:11

## 2019-11-25 NOTE — PROGRESS NOTES
Dr. Ferrell/Dr. Rasheed: Labs from today show increasing creatinine and prograf level of 9.9. Any suggestions on Prograf dosing? She is currently on 3/2 (dose increased approx 6 weeks ago). Just a reminder, she is off of Cellcept for the past week due to suspected shingles

## 2019-11-25 NOTE — PROGRESS NOTES
Out patient IVF today. Some labs tomorrow AM. Kidney biopsy next Monday after being off ASA X 5 days.

## 2019-11-25 NOTE — LETTER
November 25, 2019        Melissa Garcias  [address]  [address]  [place] [address]  Phone: [phone]  Fax: [phone]             [facility]- Transplant  [address]  [facility] [address]  Phone: [phone]   Patient: Virginia Lentz   MR Number: [id number]   YOB: 1960   Date of Visit: 11/25/2019       Dear Dr. Melissa Garcias    Thank you for referring Virginia Lentz to me for evaluation. Attached you will find relevant portions of my assessment and plan of care.    If you have questions, please do not hesitate to call me. I look forward to following Virginia Lentz along with you.    Sincerely,    Isa Rasheed MD    Enclosure    If you would like to receive this communication electronically, please contact [email or web address] or [phone] to request [employer] Link access.    [facility] Link is a tool which provides read-only access to select patient information with whom you have a relationship. Its easy to use and provides real time access to review your patients record including encounter summaries, notes, results, and demographic information.    If you feel you have received this communication in error or would no longer like to receive these types of communications, please e-mail [email or web address]

## 2019-11-25 NOTE — Clinical Note
Update: saw her today for annual visit. Has had recent ARSEN hospitalization and diagnosis of suspected shingles in past 2 weeks.  Renal function improving after  hospitalization for ARSEN but not at baseline, seeing Dr. Wili VALENTIN today. Also diagnosed by outside provider with Shingles but rash does not seem to fit the typical pattern/rash characteristics of shingles so arranged for derm appt tomorrow here.  She is still holding her cellcept because of suspected shingles, so will need to loop back around to decide what to do with her cellcept after derm appt tomorrow. Ill keep you guys updated. [place]

## 2019-11-25 NOTE — TELEPHONE ENCOUNTER
See message from Dr. Rasheed, including recs to decrease Prograf to 2 mg qAM and 2mg qPM. Pt currently taking 3/2.     Please contact pt and notify her to decrease Prograf to 2 mg BID starting tonight   Thanks

## 2019-11-25 NOTE — PROGRESS NOTES
Transplant Hepatology  Liver Transplant Recipient Follow-up    Transplant Date: 2011 (Kidney), 2011 (Liver)  UNOS Native Liver Dx: Other, Specify - Hypotension, Laennec's    Virginia is here for follow up of her liver transplant.    ORGAN: LIVER  Whole or Partial: whole liver  Donor Type:  - brain death  [facility] High Risk: no  Donor CMV Status: Positive  Donor HCV Status: Negative  Donor HBcAb: Negative  Donor HBV TRICIA:   Donor HCV TRICIA:   Biliary Anastomosis:   Arterial Anatomy:   IVC reconstruction:   Portal vein status:     She has had the following complications since transplant: acute rejection, hypertension, renal insufficiency and acute kidney rejection early post op. The noted complications need to be addressed more thoroughly today.    Of note, had an episode of recidivism in  but is off alcohol now and is very positive about her long term outlook    Also issues with recurrent right lobe pneumonia due to mechanical issue with stricturing and h/o PE, under local pulmonary follow up. Was on Elliquis for 3 months    Also recent hospitalization 2019 for ARSEN, creat peaked at 3.52, last creat 1.7.    Subjective:     Interval History: Virginia was last seen on 10/2018 by me. 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  She is currently nearly 8 years post transplant.    Has Increase in prograf 10/2019 to 3/ due to low prograf level, kidney function  outside labs showed ARSEN. This occurred after approx 2 days of viral like illness, no significant diarrhea or fluid loss but had ARSEN on labs  Admitted 2019 for ARSEN for 6 days at outside hospital, creat peaked 3.52.     Also uncontrolled HTN -  Managed by PCP but in process of changing to new PCP and cannot get appt for 2 weeks. Previously on Norvasc    Currently, she is doing well. Current complaints include none.    Hepatologist:Therapondos    ASA 81 mg  daily     History of rejection: kidney rejection early on, liver transaminase levels stable  Current Immunosuppression: currently taking Prograf 3/2, Prednisone 5 mg daily  Off of Cellcept since shingles diagnosis, (was previously on 750 mg BID)  Per Dr. Ferrell, needs to remain on Pred chronically     She had an episode of acute kidney rejection early on post op but her LFTs have generally been normal.     Liver allograft function: excellent      11/19/2019 00:00   EXT Albumin 3.6   EXT Alkaline Phosphatase 61   EXT ALT 4   EXT AST 8   EXT Basophil % 0.4   EXT BilirubiN Total 0.5   EXT BUN 38   EXT Calcium 8.3   EXT Chloride 102   EXT CO2 23   EXT Creatinine 1.72   EXT Eosinophil% 1.7   EXT Glucose 70   EXT Hematocrit 27.6   EXT Hemoglobin 9.2   EXT Lymph% 19.5   EXT MONOCYTES% 10.3   EXT Platelets 258   EXT Potassium 4.9   EXT Protein total 6   EXT SEGS% 68.1   EXT Sodium 136   EXT Tacrolimus Lvl 7.1   EXT WBC 5.4       Kidney function - CKD stage 3, followed by nephrology Dr. Hughes but needs kidney transplant f/u     Denies recurrent cancer    Last Fibroscan : none, will arrange with RTC    HEALTH MAINTENANCE:  1. Last DXA scan: pt reports + osteoporosis, PCP following   2. Last dermatology skin assessment - overdue, needs to make appt   3. A1c - per PCP  4. Pap smear/ mammogram: per local GYN      Review of Systems   Constitutional: Negative for activity change, appetite change, chills, fatigue, fever and unexpected weight change.   HENT: Negative.    Eyes: Negative.    Respiratory: Negative for cough and shortness of breath.    Cardiovascular: Negative for chest pain and leg swelling.   Gastrointestinal: Negative for abdominal distention, abdominal pain, constipation, diarrhea, nausea and vomiting.   Endocrine: Negative.    Genitourinary: Negative for dysuria, flank pain and urgency.   Musculoskeletal: Negative.    Skin: Positive for rash (see picture). Negative for wound.   Allergic/Immunologic: Positive  for immunocompromised state.   Neurological: Negative for dizziness, weakness and headaches.   Hematological: Negative for adenopathy. Does not bruise/bleed easily.   Psychiatric/Behavioral: Negative.        Objective:     Physical Exam   Constitutional: She is oriented to person, place, and time. She appears well-developed and well-nourished.   HENT:   Head: Normocephalic and atraumatic.   Eyes: Pupils are equal, round, and reactive to light. EOM are normal. No scleral icterus.   Neck: Normal range of motion. Neck supple.   Cardiovascular: Normal rate, regular rhythm and normal heart sounds.   No murmur heard.  Pulmonary/Chest: Effort normal. She has no wheezes. She has no rales.   Breath sounds mildly coarse right lower and middle lobe, followed by pulmonology    Abdominal: Soft. Bowel sounds are normal. She exhibits no distension and no mass. There is no tenderness. There is no rebound and no guarding.   Musculoskeletal: Normal range of motion. She exhibits no edema or tenderness.   Lymphadenopathy:     She has no cervical adenopathy.   Neurological: She is alert and oriented to person, place, and time.   Skin: Skin is warm and dry. Capillary refill takes less than 2 seconds. Rash (see picture) noted. No erythema.   Psychiatric: She has a normal mood and affect. Her behavior is normal.             Assessment/Plan:     1. Status post liver transplantation    2. Essential hypertension    3. Osteoporosis, unspecified osteoporosis type, unspecified pathological fracture presence    4. Immunosuppression    5. -donor kidney transplant    6. CKD (chronic kidney disease) stage 3, GFR 30-59 ml/min    7. ARSEN (acute kidney injury)    8. Rash      1. S/p liver and kidney transplant , liver disease due to alcohol  On ASA 81 mg daily    2. Immunosuppression - currently taking currently taking Prograf 3/2, Prednisone 5 mg daily  OFF Cellcept 750 mg BID due to recent suspected shingles diagnosis  -- will arrange  repeat prograf today  --- immunosuppression stable, kidney function post recent ARSEN    3. Rash  -- no tingling, pain  -- given rash that appears to be affecting multiple dermatomes and crosses the midline, would like dermatology input to confirm diagnosis, as cellcept currently on hold due to suspected Shingles  -- on Valtrex since 11/20     3. ARSEN on CKD stage 3   -- followed by nephrology Dr. Hughes, kidney transplant f/u arranged today     4. Health maintenance  -- yearly dermatology visit, overdue, derm appt arranged tomorrow for rash, pt will establish care chronically with derm near home for annual skin checks  -- DXA scan - done yearly, followed by PCP  -- Diabetes screening with A1c - per PCP  -- Fibroscan yearly : will arrange with RTC    5. HTN  -- followed by PCP but in the process of establishing care with new PCP, refilled Norvasc 5 mg daily until pt can be seen by PCP    6. Osteoporosis  -- followed by PCP    7. H/o acute kidney rejection  -- occurred soon after transplant  -- on Prednisone, Prograf and Cellcept   -- pt to see kidney transplant today    8. h/o recurrent pneumonia, h/o PE  -- followed by pulmonology Dr. Wilcox       PLAN:  1. Repeat labs today for renal/kidney function and prograf  2. Kidney transplant appt today  3. Derm appt tomorrow for opinion on rash, suspected shingles?  4. RTC in 1 year, lab repeat to be based on repeat labs today and kidney transplant plan   5. Fibroscan yearly, starting with RTC in 1 year (recommend scheduling on same day as RTC before appt, needs to be fasting for 3 hours)  6. Will discuss plan for prograf and Cellcept with Dr. Ferrell after derm appt tomorrow (cellcept on hold due to rash suspected to be shingles) and after f/u visit with KTM and repeat prograf level    Note routed to post-liver transplant coordinator alphonso, Dr. Ferrell, Dr. Lynn (derm),  Dr. Wili Theodore, EDENILSON SENAOS Patient Status  Functional Status: 100% - Normal,  no complaints, no evidence of disease  Physical Capacity: No Limitations

## 2019-11-25 NOTE — PROGRESS NOTES
Kidney/Liver Post-Transplant Assessment    Referring Physician: Melissa Garcias  Current Nephrologist:     ORGAN: RIGHT KIDNEY  Donor Type: donation after brain death   PHS Increased Risk: no  Cold Ischemia: 480 mins  Induction Medications: steroids (prednisone,methylprednisolone,solumedrol,medrol,decadron)  CMV Status:    Subjective:     CC:  Reassessment of renal allograft function and management of chronic immunosuppression.    Kidney History:  Ms. Lentz is a 59 y.o. year old White female who received a  - brain death kidney and liver transplant on 11 for alcoholic cirrhosis. Baseline Cr 1.3-1.6.     Post transplant history:   - Biopsy-proven cellular and vascular rejection 12, Treated with Thymoglobulin 150 mg x7 doses.    - long-standing history of kidney stones, she required surgery in right kidney (stone extraction). She also had lithotrypsy x 2 in early 20s.   A Txp US  showed two small hyperechoic structures that may represent nonobstructing stones.    - Recurrent Pneumonia  - ARF 2019 creat 3.52 improved to 1.7    She sees her nephrologist in [place]. She was recently hospitalized in hospital with ARF which improved partially ( Cr 3.5 to 1.7). She also has rashes on her gluteal areas with diagnosis of Shingles on Valtrex.  She denies fever, chills and  hematuria, frequency, urgency or any other symptoms attributable to the urinary tract.  She accepts that she did not drink enough water since 48 hours. Her Cr is 2.5 today.     I discussed the plan with her and her daughter. Will give her NS outpatient and order some las tomorrow AM. In addition we plan for kidney biopsy on Monday. She needs to be off ASA for 5 days         Review of Systems   Constitutional: Negative for fatigue  Respiratory: Negative for shortness of breath.    Cardiovascular: Negative for chest pain.   Gastrointestinal: Negative for nausea, vomiting and abdominal pain.   Genitourinary: Negative for  dysuria and decreased urine volume.   Musculoskeletal: Negative for gait problem.   Skin: + rash on gluteal areas  Neurological: Negative for seizures and weakness.   Hematological: Does not bruise/bleed easily.   Psychiatric/Behavioral: Negative for sleep disturbance.                    Objective:   Blood pressure (!) 164/85, pulse 100, temperature 97.5 °F (36.4 °C), temperature source Oral, resp. rate 16, height 5' (1.524 m), weight 51.9 kg (114 lb 6.7 oz), SpO2 96 %.body mass index is 22.35 kg/m².    Physical Exam  General: No acute distress, well groomed  Respiratory: Clear to auscultation bilaterally, respirations unlabored, no rales  Cardiovacular: Regular rate and rhythm  Gastrointestinal: Soft, non-tender, bowel sounds normal, no hepatosplenomegaly  Renal allograft exam: No tenderness, no bruits, normal exam  Musculoskeletal: No knee or ankle joint tenderness or swelling.   Skin: Bilateral gluteal rashes-crusted   Neurologic: CN grossly intact,  alert and oriented x 3      Labs:  Lab Results   Component Value Date    WBC 6.65 11/25/2019    HGB 10.6 (L) 11/25/2019    HCT 34.6 (L) 11/25/2019     11/25/2019    K 4.6 11/25/2019    CL 99 11/25/2019    CO2 29 11/25/2019    BUN 38 (H) 11/25/2019    CREATININE 2.3 (H) 11/25/2019    EGFRNONAA 22.4 (A) 11/25/2019    GLUCOSE 130 (H) 11/29/2011    CALCIUM 9.7 11/25/2019    PHOS 4.4 07/23/2013    MG 1.5 (L) 03/09/2012    ALBUMIN 3.8 11/25/2019    AST 11 11/25/2019    ALT 11 11/25/2019       Lab Results   Component Value Date    EXTWBC 5.4 11/19/2019    EXTSEGS 68.1 11/19/2019    EXTPLATELETS 258 11/19/2019    EXTHEMOGLOBI 9.2 11/19/2019    EXTHEMATOCRI 27.6 11/19/2019    EXTCREATININ 1.72 11/19/2019    EXTSODIUM 136 11/19/2019    EXTPOTASSIUM 4.9 11/19/2019    EXTBUN 38 11/19/2019    EXTCO2 23 11/19/2019    EXTCALCIUM 8.3 11/19/2019    EXTPHOSPHORU 5.5 (A) 12/13/2013    EXTGLUCOSE 70 11/19/2019    EXTALBUMIN 3.6 11/19/2019    EXTAST 8 11/19/2019    EXTALT 4  2019    EXTBILITOTAL 0.5 2019       Lab Results   Component Value Date    EXTTACROLVL 7.1 2019    EXTPROTEINUA 8.3 2013       Labs were reviewed with the patient.    Assessment/Plan:     1. ARSEN (acute kidney injury)    2. -donor kidney transplant    3. Essential hypertension    4. H/O recurrent pneumonia        Ms. Lentz is a 59 y.o. female with:     # History of ESRD presumed secondary to  Liver disease  - s/p SLK transplant in   - baseline 1.2-1.6  - Recent ARSEN in 2019: Cr 3.5 improved to 1.7. Today Cr is 2.5  -  NS outpatient today. Will order some las tomorrow AM. In addition we plan for kidney biopsy on Monday. She needs to be off ASA for 5 days       # Immunosuppression:    - currently taking currently taking Prograf 3/2, Prednisone 5 mg daily,  last trough 7.1  - pending prograf level  - off MMF due to gluteal lesions      # HTN:   - BPs not well controlled   - continue with home blood pressure monitoring  - low salt and healthy life discussed with the patient      # Metabolic Bone Disease/Secondary Hyperparathyroidism:  - will check  PTH, vitamin D today    # Anemia of chronic disease:   - Hb is stable            Plan:  Kidney biopsy next Monday  IVF today (NS 1.5 liter)  Check DSA, allosure, UPCR, Cylex, PTH, vitamin D, CMV and BK, renal panel tomorrow  Continue to hold MMF for now  Dermatology visit tomorrow            sIa Rasheed MD

## 2019-11-25 NOTE — LETTER
November 25, 2019        Melissa Garcias  [address]  [address]  [place] [address]  Phone: [phone]  Fax: [phone]             [facility] - Liver Transplant  [address]  [facility] [address]  Phone: [phone]   Patient: Virginia Lentz   MR Number: [id number]   YOB: 1960   Date of Visit: 11/25/2019       Dear Dr. Melissa Garcias    Thank you for referring Virginia Lentz to me for evaluation. Attached you will find relevant portions of my assessment and plan of care.    If you have questions, please do not hesitate to call me. I look forward to following Virginia Lentz along with you.    Sincerely,    Ceci Theodore NP    Enclosure    If you would like to receive this communication electronically, please contact [email or web address] or [phone] to request [email or web address] Link access.    [email or web address] Link is a tool which provides read-only access to select patient information with whom you have a relationship. Its easy to use and provides real time access to review your patients record including encounter summaries, notes, results, and demographic information.    If you feel you have received this communication in error or would no longer like to receive these types of communications, please e-mail [email or web address]

## 2019-11-25 NOTE — PATIENT INSTRUCTIONS
1.  dermatology department ([phone])   2. Will discuss with Dr. Ferrell plan for restarting Cellcept and when to repeat labs. Will also discuss with your coordinator and Dr. Ferrell if you need a follow up appt with the kidney transplant team here  3. Recommend yearly dermatologist visits for skin cancer screening  4. Follow up with PCP and osteoporosis and high blood pressure  5. Follow up with your primary care doctor for blood pressure, cholesterol and diabetes screenings at least yearly There is a higher chance of high blood pressure, diabetes, high cholesterol and weight gain post transplant, so recommend to follow closely with your primary doctor to monitor for these conditions or worsening of these conditions. Recommend diabetes screening at least yearly   6. Will plan to do a test in the clinic when you return in a year to assess for fatty liver and any scar tissue in the liver. Its called a fibroscan and is a quick ultrasound type test that is done in the clinic. I will have your coordinator schedule it when you return in 1 year. You need to be fasting for 3 hours before appt     EDUCATION:  -- You have an increased risk of infection (bacterial, viral, or fungal infections) because you are on immunosuppression medications  -- schedule colonoscopy and annual well visits/screenings as recommended by your primary care provider  -- increased risk of cancer, including skin cancers. Avoid sun, wears hat when outside, use sunscreen with at least 15 spf at all times when outside, schedule yearly dermatology skin checks (at higher risk for skin cancer post transplant)  -- increased risk of kidney disease with the use of immunosuppression medications

## 2019-11-25 NOTE — Clinical Note
Would you mind updating me or Dr. Ferrell tomorrow after your visit? She is post liver transplant in 2011, outside provider suspected shingles. Not sure what it looked like last week (pt does report there were small blisters) but it looks like its on multiple dermatomes and crosses the midline, so wanted your opinion. Her cellcept immunosuppression is currently on hold because previous suspicion for Shingles Thanks in advanceCeci

## 2019-11-25 NOTE — TELEPHONE ENCOUNTER
----- Message from Isa Rasheed MD sent at 11/25/2019  2:46 PM CST -----  Please decrease prograf to 2 mg BID. I think she is on 3/2 mg.   ----- Message -----  From: Ceci Theodore NP  Sent: 11/25/2019   2:08 PM CST  To: Ruben Ferrell MD, MD Dr. Mariaelena Frias/Dr. Rasheed: Labs from today show increasing creatinine and prograf level of 9.9. Any suggestions on Prograf dosing? She is currently on 3/2 (dose increased approx 6 weeks ago). Just a reminder, she is off of Cellcept for the past week due to suspected shingles

## 2019-11-25 NOTE — PLAN OF CARE
Encounter Date: 7/21/2017    SCRIBE #1 NOTE: Sarah DAVIS, markus scribing for, and in the presence of, Dr. Alexis.       History     Chief Complaint   Patient presents with    Vomiting     recent ED visit for same     Diarrhea       07/21/2017 2:55 PM     Chief Complaint: Vomiting      Angela Durham is a 46 y.o. female with a history of HTN, DM, and GERD who presents to the ED with complaints of vomiting. Associated symptoms include diarrhea and lower right abdominal pain. The patient states she has had similar symptoms last weeks and visited this ED four days ago, on Monday. She states of having three episodes of emesis today and approximately eight to ten episodes of emesis prior to today. During the last ED visit, she was told she had constipation. The patient believes that the diarrhea is from the medication prescribed to her during her last visit. The patient states that symptoms have not subsided since the last ED visit. The patient also states she started her menstrual cycle two days ago and has never had associated vomiting with her cycle. She states she still has appendix. Patient denies back pain, fever, coughing, dysuria, hematuria, and difficulty urinating. Patient has also had previous gallbladder surgery.       The history is provided by the patient.     Review of patient's allergies indicates:   Allergen Reactions    Doxycycline      Past Medical History:   Diagnosis Date    Diabetes mellitus     GERD (gastroesophageal reflux disease)     Hypertension      History reviewed. No pertinent surgical history.  History reviewed. No pertinent family history.  Social History   Substance Use Topics    Smoking status: Never Smoker    Smokeless tobacco: Never Used    Alcohol use No     Review of Systems   Constitutional: Negative for fever.   HENT: Negative for congestion.    Eyes: Negative for visual disturbance.   Respiratory: Negative for wheezing.    Cardiovascular: Negative for chest pain.  Pt educated on hand hygiene and infection control.        Gastrointestinal: Positive for abdominal pain (Right lower abdomen.), diarrhea and vomiting.   Genitourinary: Negative for dysuria.   Musculoskeletal: Negative for joint swelling.   Skin: Negative for rash.   Neurological: Negative for syncope.   Hematological: Does not bruise/bleed easily.   Psychiatric/Behavioral: Negative for confusion.       Physical Exam     Initial Vitals [07/21/17 1447]   BP Pulse Resp Temp SpO2   136/78 78 (!) 24 98.1 °F (36.7 °C) 97 %      MAP       97.33         Physical Exam    Constitutional: She appears well-nourished.   HENT:   Head: Normocephalic and atraumatic.   Eyes: Conjunctivae and EOM are normal.   Neck: Normal range of motion. Neck supple. No thyroid mass present.   Cardiovascular: Normal rate and regular rhythm.   Pulmonary/Chest: No respiratory distress. She has no wheezes.   Abdominal: Soft. Normal appearance and bowel sounds are normal.   Musculoskeletal: She exhibits no edema or tenderness.   Neurological: She is alert and oriented to person, place, and time. She has normal strength. No cranial nerve deficit or sensory deficit.   Skin: Skin is warm and dry. No rash noted. No erythema.   Psychiatric: She has a normal mood and affect. Her speech is normal. Cognition and memory are normal.         ED Course   Procedures  Labs Reviewed - No data to display          Medical Decision Making:   Initial Assessment:   This patient was interviewed and examined and found to be in no acute distress.  At this time she has no focal abdominal tenderness on examination.  She has no peritoneal signs and does report that her vomiting has improved over the last few days and her constipation has resolved MiraLAX therapy.  IV was established and she was provided antiemetic medication and bolus hydration.  Screening labs will be obtained assess for underlying dehydration, and organ function, as well as radiographs to assess for progressing obstruction or ileus.  Differential Diagnosis:   DDX  include, but are not limited to, IBS, obstruction, ileus, gastritis, esophagitis, pancreatitis, cystitis, pyelonephritis, atypical ACS  ED Management:  Screening labs found to be unremarkable with the exception of mild hyperglycemia and a transaminitis.  Patient is aware of her elevated liver enzymes and is following this up with her doctor.  Radiographs are unremarkable.  Urinalysis does show blood contamination, as the patient is currently on her menstrual cycle.  She is reporting no urinary tract symptoms at this time and urine culture will be sent prior to antibiotic administration.  On reassessment, the patient reported no vomiting or abdominal pain and I do think she is currently stable for discharge.  We had a discussion about her worsening symptoms being associated with progressing gastritis and I do think this is worsening since she has had her protonix dosage cut.  She is in agreement.  She is asked to go home and maintain a bland diet will be provided prescription for oral promethazine.  She is asked return the emergency Department for any new, concerning, or worsening symptoms.  Patient agreeable with splint follow-up she was discharged in stable condition.            Scribe Attestation:   Scribe #1: I performed the above scribed service and the documentation accurately describes the services I performed. I attest to the accuracy of the note.    Attending Attestation:           Physician Attestation for Scribe:  Physician Attestation Statement for Scribe #1: I, Dr. Alexis, reviewed documentation, as scribed by Sarah Ignacio in my presence, and it is both accurate and complete.                 ED Course     Clinical Impression:   The primary encounter diagnosis was Gastroesophageal reflux disease, esophagitis presence not specified. Diagnoses of Vomiting and diarrhea and Transaminitis were also pertinent to this visit.    Disposition:   Disposition: Discharged  Condition: Stable                         Roger Alexis MD  07/21/17 2765

## 2019-11-26 ENCOUNTER — TELEPHONE (OUTPATIENT)
Dept: TRANSPLANT | Facility: CLINIC | Age: 59
End: 2019-11-26

## 2019-11-26 ENCOUNTER — LAB VISIT (OUTPATIENT)
Dept: LAB | Facility: HOSPITAL | Age: 59
End: 2019-11-26
Attending: INTERNAL MEDICINE
Payer: COMMERCIAL

## 2019-11-26 ENCOUNTER — PATIENT MESSAGE (OUTPATIENT)
Dept: TRANSPLANT | Facility: CLINIC | Age: 59
End: 2019-11-26

## 2019-11-26 ENCOUNTER — OFFICE VISIT (OUTPATIENT)
Dept: DERMATOLOGY | Facility: CLINIC | Age: 59
End: 2019-11-26
Payer: COMMERCIAL

## 2019-11-26 DIAGNOSIS — Z94.0 DECEASED-DONOR KIDNEY TRANSPLANT: ICD-10-CM

## 2019-11-26 DIAGNOSIS — B00.9 HSV (HERPES SIMPLEX VIRUS) INFECTION: Primary | ICD-10-CM

## 2019-11-26 DIAGNOSIS — Z94.0 STATUS POST DECEASED-DONOR KIDNEY TRANSPLANTATION: ICD-10-CM

## 2019-11-26 DIAGNOSIS — Z94.0 STATUS POST DECEASED-DONOR KIDNEY TRANSPLANTATION: Primary | ICD-10-CM

## 2019-11-26 LAB
25(OH)D3+25(OH)D2 SERPL-MCNC: 18 NG/ML (ref 30–96)
ALBUMIN SERPL BCP-MCNC: 3.8 G/DL (ref 3.5–5.2)
ALP SERPL-CCNC: 96 U/L (ref 55–135)
ALT SERPL W/O P-5'-P-CCNC: 8 U/L (ref 10–44)
ANION GAP SERPL CALC-SCNC: 9 MMOL/L (ref 8–16)
AST SERPL-CCNC: 9 U/L (ref 10–40)
BACTERIA #/AREA URNS AUTO: ABNORMAL /HPF
BASOPHILS # BLD AUTO: 0.03 K/UL (ref 0–0.2)
BASOPHILS NFR BLD: 0.7 % (ref 0–1.9)
BILIRUB SERPL-MCNC: 0.6 MG/DL (ref 0.1–1)
BILIRUB UR QL STRIP: NEGATIVE
BUN SERPL-MCNC: 29 MG/DL (ref 6–20)
CALCIUM SERPL-MCNC: 9.5 MG/DL (ref 8.7–10.5)
CHLORIDE SERPL-SCNC: 107 MMOL/L (ref 95–110)
CLARITY UR REFRACT.AUTO: ABNORMAL
CO2 SERPL-SCNC: 25 MMOL/L (ref 23–29)
COLOR UR AUTO: YELLOW
CREAT SERPL-MCNC: 2.1 MG/DL (ref 0.5–1.4)
CREAT UR-MCNC: 59 MG/DL (ref 15–325)
DIFFERENTIAL METHOD: ABNORMAL
EOSINOPHIL # BLD AUTO: 0.1 K/UL (ref 0–0.5)
EOSINOPHIL NFR BLD: 3.2 % (ref 0–8)
ERYTHROCYTE [DISTWIDTH] IN BLOOD BY AUTOMATED COUNT: 16.2 % (ref 11.5–14.5)
EST. GFR  (AFRICAN AMERICAN): 28.9 ML/MIN/1.73 M^2
EST. GFR  (NON AFRICAN AMERICAN): 25 ML/MIN/1.73 M^2
GLUCOSE SERPL-MCNC: 97 MG/DL (ref 70–110)
GLUCOSE UR QL STRIP: NEGATIVE
HCT VFR BLD AUTO: 33.8 % (ref 37–48.5)
HGB BLD-MCNC: 9.8 G/DL (ref 12–16)
HGB UR QL STRIP: ABNORMAL
HYALINE CASTS UR QL AUTO: 0 /LPF
IMM GRANULOCYTES # BLD AUTO: 0.01 K/UL (ref 0–0.04)
IMM GRANULOCYTES NFR BLD AUTO: 0.2 % (ref 0–0.5)
INR PPP: 0.9 (ref 0.8–1.2)
KETONES UR QL STRIP: NEGATIVE
LEUKOCYTE ESTERASE UR QL STRIP: ABNORMAL
LYMPHOCYTES # BLD AUTO: 1 K/UL (ref 1–4.8)
LYMPHOCYTES NFR BLD: 23.7 % (ref 18–48)
MCH RBC QN AUTO: 27.1 PG (ref 27–31)
MCHC RBC AUTO-ENTMCNC: 29 G/DL (ref 32–36)
MCV RBC AUTO: 94 FL (ref 82–98)
MICROSCOPIC COMMENT: ABNORMAL
MONOCYTES # BLD AUTO: 0.5 K/UL (ref 0.3–1)
MONOCYTES NFR BLD: 11.2 % (ref 4–15)
NEUTROPHILS # BLD AUTO: 2.5 K/UL (ref 1.8–7.7)
NEUTROPHILS NFR BLD: 61 % (ref 38–73)
NITRITE UR QL STRIP: NEGATIVE
NRBC BLD-RTO: 0 /100 WBC
PH UR STRIP: 6 [PH] (ref 5–8)
PLATELET # BLD AUTO: 320 K/UL (ref 150–350)
PMV BLD AUTO: 9.6 FL (ref 9.2–12.9)
POTASSIUM SERPL-SCNC: 4.3 MMOL/L (ref 3.5–5.1)
PROT SERPL-MCNC: 7.4 G/DL (ref 6–8.4)
PROT UR QL STRIP: ABNORMAL
PROT UR-MCNC: 118 MG/DL (ref 0–15)
PROT/CREAT UR: 2 MG/G{CREAT} (ref 0–0.2)
PROTHROMBIN TIME: 9.5 SEC (ref 9–12.5)
PTH-INTACT SERPL-MCNC: 345 PG/ML (ref 9–77)
RBC # BLD AUTO: 3.61 M/UL (ref 4–5.4)
RBC #/AREA URNS AUTO: 9 /HPF (ref 0–4)
SODIUM SERPL-SCNC: 141 MMOL/L (ref 136–145)
SP GR UR STRIP: 1.01 (ref 1–1.03)
SQUAMOUS #/AREA URNS AUTO: 8 /HPF
TACROLIMUS BLD-MCNC: 7 NG/ML (ref 5–15)
URN SPEC COLLECT METH UR: ABNORMAL
WBC # BLD AUTO: 4.1 K/UL (ref 3.9–12.7)
WBC #/AREA URNS AUTO: 23 /HPF (ref 0–5)

## 2019-11-26 PROCEDURE — 81001 URINALYSIS AUTO W/SCOPE: CPT

## 2019-11-26 PROCEDURE — 80197 ASSAY OF TACROLIMUS: CPT

## 2019-11-26 PROCEDURE — 99202 PR OFFICE/OUTPT VISIT, NEW, LEVL II, 15-29 MIN: ICD-10-PCS | Mod: S$GLB,,, | Performed by: DERMATOLOGY

## 2019-11-26 PROCEDURE — 99999 PR PBB SHADOW E&M-EST. PATIENT-LVL II: ICD-10-PCS | Mod: PBBFAC,,, | Performed by: DERMATOLOGY

## 2019-11-26 PROCEDURE — 99999 PR PBB SHADOW E&M-EST. PATIENT-LVL II: CPT | Mod: PBBFAC,,, | Performed by: DERMATOLOGY

## 2019-11-26 PROCEDURE — 82570 ASSAY OF URINE CREATININE: CPT

## 2019-11-26 PROCEDURE — 86833 HLA CLASS II HIGH DEFIN QUAL: CPT | Mod: PO

## 2019-11-26 PROCEDURE — 86832 HLA CLASS I HIGH DEFIN QUAL: CPT | Mod: PO

## 2019-11-26 PROCEDURE — 87529 HSV DNA AMP PROBE: CPT

## 2019-11-26 PROCEDURE — 99202 OFFICE O/P NEW SF 15 MIN: CPT | Mod: S$GLB,,, | Performed by: DERMATOLOGY

## 2019-11-26 PROCEDURE — 87799 DETECT AGENT NOS DNA QUANT: CPT

## 2019-11-26 PROCEDURE — 85610 PROTHROMBIN TIME: CPT

## 2019-11-26 PROCEDURE — 83970 ASSAY OF PARATHORMONE: CPT

## 2019-11-26 PROCEDURE — 82306 VITAMIN D 25 HYDROXY: CPT

## 2019-11-26 PROCEDURE — 80053 COMPREHEN METABOLIC PANEL: CPT

## 2019-11-26 PROCEDURE — 85025 COMPLETE CBC W/AUTO DIFF WBC: CPT

## 2019-11-26 PROCEDURE — 87798 DETECT AGENT NOS DNA AMP: CPT

## 2019-11-26 PROCEDURE — 86977 RBC SERUM PRETX INCUBJ/INHIB: CPT | Mod: PO

## 2019-11-26 PROCEDURE — 86352 CELL FUNCTION ASSAY W/STIM: CPT

## 2019-11-26 NOTE — TELEPHONE ENCOUNTER
----- Message from Ruben Ferrell MD sent at 11/25/2019  4:21 PM CST -----  Reduce tac to 2/2 and continue with plans for a kidney biopsy on Monday    ----- Message -----  From: Isa Rasheed MD  Sent: 11/25/2019   2:11 PM CST  To: Ruben Ferrell MD, Ceci Theodore NP, #    Out patient IVF today. Some labs tomorrow AM. Kidney biopsy next Monday after being off ASA X 5 days.

## 2019-11-26 NOTE — PROGRESS NOTES
Please update her med list prior any medication change. I doubt patient takes all these meds. Thank you!

## 2019-11-26 NOTE — TELEPHONE ENCOUNTER
----- Message from Sumit Pleitez sent at 11/26/2019  2:57 PM CST -----  Contact: pt  Calling to speak with Angelic Spence pt says its ergent     Call back: [phone]

## 2019-11-26 NOTE — TELEPHONE ENCOUNTER
Called pt to discuss.  Instructed to take Prograf 2 mg by mouth twice daily.  Labs drawn today, pending review.  Pt verbalized understanding.

## 2019-11-26 NOTE — Clinical Note
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 It may be best to put her on antiviral prophylaxis. Took swab to confirm but not VZV.  Thanks! Maria Esther (derm)

## 2019-11-26 NOTE — TELEPHONE ENCOUNTER
Discussed lab results w/ pt.  Called Dr. Rasheed to verify pt does not need to be admitted.  Per MD, pt can return home.  Called pt to advise.  Verbalized understanding.

## 2019-11-26 NOTE — PROGRESS NOTES
Subjective:       Patient ID:  Virginia Lentz is a 59 y.o. female who presents for   Chief Complaint   Patient presents with    Rash     lower back     60 yo female with hx of kidney transplant, on Cellcept/prograf, here for evaluation of rash on buttocks.  She reports that this began approximately 2 weeks ago on buttocks and was blistered. She saw her family doctor at home last Tuesday in [place] who told her she may have shingles and started her on Valtrex 1000 mg once daily.  Today is day 7.  She notified transplant team who have placed her Cellcept on hold for this. She continues to take her Prograf.  This is not the first episode of blisters in the genital area.  She reports her gynecologist previously saw something suspicious for herpes in this area on exam and did bloodwork. She says she tested positive for both type 1 and type 2.  She thinks this is her 3rd outbreak.    Patient has also noted that previously to this outbreak she has had renal issues and pneumonia. Does not describe urinary retention.    The rash is much improved and all crusted over.    Rash  - Initial  Affected locations: back  Duration: 2 weeks  Signs / symptoms: redness, rough, blistering and draining  Severity: moderate to severe  Timing: constant  Aggravated by: stress        Review of Systems   Skin: Positive for rash, daily sunscreen use, activity-related sunscreen use and wears hat. Negative for recent sunburn.   Psychiatric/Behavioral: Positive for high stress.   Hematologic/Lymphatic: Bruises/bleeds easily.        Objective:    Physical Exam   Constitutional: She appears well-developed and well-nourished.   Neurological: She is alert and oriented to person, place, and time.   Psychiatric: She has a normal mood and affect.   Skin:   Areas Examined (abnormalities noted in diagram):   Genitals / Buttocks / Groin Inspection Performed  Back Inspection Performed  RUE Inspected  LUE Inspection Performed  RLE Inspected  LLE  Inspection Performed              Diagram Legend     Erythematous scaling macule/papule c/w actinic keratosis       Vascular papule c/w angioma      Pigmented verrucoid papule/plaque c/w seborrheic keratosis      Yellow umbilicated papule c/w sebaceous hyperplasia      Irregularly shaped tan macule c/w lentigo     1-2 mm smooth white papules consistent with Milia      Movable subcutaneous cyst with punctum c/w epidermal inclusion cyst      Subcutaneous movable cyst c/w pilar cyst      Firm pink to brown papule c/w dermatofibroma      Pedunculated fleshy papule(s) c/w skin tag(s)      Evenly pigmented macule c/w junctional nevus     Mildly variegated pigmented, slightly irregular-bordered macule c/w mildly atypical nevus      Flesh colored to evenly pigmented papule c/w intradermal nevus       Pink pearly papule/plaque c/w basal cell carcinoma      Erythematous hyperkeratotic cursted plaque c/w SCC      Surgical scar with no sign of skin cancer recurrence      Open and closed comedones      Inflammatory papules and pustules      Verrucoid papule consistent consistent with wart     Erythematous eczematous patches and plaques     Dystrophic onycholytic nail with subungual debris c/w onychomycosis     Umbilicated papule    Erythematous-base heme-crusted tan verrucoid plaque consistent with inflamed seborrheic keratosis     Erythematous Silvery Scaling Plaque c/w Psoriasis     See annotation      Assessment / Plan:        HSV (herpes simplex virus) infection - pt reports serologies testing positive for HSV in the past at home MD  Not consistent clinically with shingles/VZV  Will notify transplant team    Pt has completed 1 g Valtrex qd x 7 days - this should cover this recurrent outbreak however pt interested in prophylaxis - will defer to transplant/ID for dosing     pcr taken today to confirm HSV diagnosis  -     HSV by Rapid PCR, Non-Blood [facility]; Skin; Future; Expected date: 11/26/2019  -     Varicella Zoster By  Rapid Pcr Tissue; Future; Expected date: 11/26/2019    Pt needs yearly skin exams. Ok to have done locally.         Follow up if symptoms worsen or fail to improve, for for TBSE, to transplant clinic.

## 2019-11-26 NOTE — TELEPHONE ENCOUNTER
----- Message from Angelina Cain sent at 11/26/2019  3:32 PM CST -----  Pt wants to know if she could get a call back to discuss labs      Pt contact [phone]

## 2019-11-26 NOTE — LETTER
November 26, 2019      Ceci Theodore, NP  [address]  [facility] [address]           [facility]  [address]  [facility] [address]  Phone: [phone]  Fax: [phone]          Patient: Virginia Lentz   MR Number: [id number]   YOB: 1960   Date of Visit: 11/26/2019       Dear Ceci Theodore:    Thank you for referring Virginia Lentz to me for evaluation. Attached you will find relevant portions of my assessment and plan of care.    If you have questions, please do not hesitate to call me. I look forward to following Virginia Lentz along with you.    Sincerely,    Maria Esther Medina MD    Enclosure  CC:  No Recipients    If you would like to receive this communication electronically, please contact [email or web address] or [phone] to request more information on [employer] access.    For providers and/or their staff who would like to refer a patient to [facility], please contact us through our one-stop-shop provider referral line, [facility] [place], at [phone].    If you feel you have received this communication in error or would no longer like to receive these types of communications, please e-mail [email or web address]

## 2019-11-27 ENCOUNTER — PATIENT MESSAGE (OUTPATIENT)
Dept: TRANSPLANT | Facility: CLINIC | Age: 59
End: 2019-11-27

## 2019-11-27 ENCOUNTER — TELEPHONE (OUTPATIENT)
Dept: TRANSPLANT | Facility: CLINIC | Age: 59
End: 2019-11-27

## 2019-11-27 LAB
BKV DNA SERPL NAA+PROBE-ACNC: <125 COPIES/ML
BKV DNA SERPL NAA+PROBE-LOG#: <2.1 LOG (10) COPIES/ML
BKV DNA SERPL QL NAA+PROBE: NOT DETECTED

## 2019-11-27 NOTE — TELEPHONE ENCOUNTER
----- Message from Luiza Bray RN sent at 11/27/2019 10:15 AM CST -----      ----- Message -----  From: Isa Rasheed MD  Sent: 11/27/2019   9:28 AM CST  To: [facility] Post-Kidney Transplant Clinical    Is she symptomatic for UTI?

## 2019-11-29 ENCOUNTER — PATIENT MESSAGE (OUTPATIENT)
Dept: TRANSPLANT | Facility: CLINIC | Age: 59
End: 2019-11-29

## 2019-11-29 LAB
CMV DNA SERPL NAA+PROBE-ACNC: NORMAL IU/ML
HSV1 DNA SPEC QL NAA+PROBE: NEGATIVE
HSV2 DNA SPEC QL NAA+PROBE: POSITIVE
SPECIMEN SOURCE: ABNORMAL
SPECIMEN SOURCE: NORMAL
VARICELLA ZOSTER BY PCR RESULT: NEGATIVE

## 2019-12-02 ENCOUNTER — PATIENT MESSAGE (OUTPATIENT)
Dept: TRANSPLANT | Facility: CLINIC | Age: 59
End: 2019-12-02

## 2019-12-02 ENCOUNTER — TELEPHONE (OUTPATIENT)
Dept: TRANSPLANT | Facility: CLINIC | Age: 59
End: 2019-12-02

## 2019-12-02 ENCOUNTER — PATIENT MESSAGE (OUTPATIENT)
Dept: DERMATOLOGY | Facility: CLINIC | Age: 59
End: 2019-12-02

## 2019-12-02 LAB
EXT ALBUMIN: 4.3
EXT ALKALINE PHOSPHATASE: 103
EXT ALT: 23
EXT AST: 17
EXT BASOPHIL%: 0.6
EXT BILIRUBIN TOTAL: 0.5
EXT BUN: 31
EXT CALCIUM: 9.6
EXT CHLORIDE: 101
EXT CO2: 24
EXT CREATININE: 2.29 MG/DL
EXT EOSINOPHIL%: 0.6
EXT GLUCOSE: 127
EXT HEMATOCRIT: 33.8
EXT HEMOGLOBIN: 10.8
EXT LYMPH%: 9.6
EXT MONOCYTES%: 6.7
EXT PLATELETS: 423
EXT POTASSIUM: 4.7
EXT PROTEIN TOTAL: 7.8
EXT SEGS%: 82.5
EXT SODIUM: 134 MMOL/L
EXT TACROLIMUS LVL: 6.8
EXT WBC: 7.7

## 2019-12-02 NOTE — TELEPHONE ENCOUNTER
----- Message from Ish Lou sent at 12/2/2019 11:45 AM CST -----  Contact: Pt  Calling to get labs results from 11/29 and speak w/ coordinator regarding scheduling of    kidney biopsy     Contact: [phone]

## 2019-12-03 ENCOUNTER — TELEPHONE (OUTPATIENT)
Dept: TRANSPLANT | Facility: CLINIC | Age: 59
End: 2019-12-03

## 2019-12-03 DIAGNOSIS — Z94.0 STATUS POST DECEASED-DONOR KIDNEY TRANSPLANTATION: Primary | ICD-10-CM

## 2019-12-03 LAB
CLASS I ANTIBODY COMMENTS - LUMINEX: NORMAL
CLASS II ANTIBODY COMMENTS - LUMINEX: NORMAL
DSA1 TESTING DATE: NORMAL
DSA12 TESTING DATE: NORMAL
DSA2 TESTING DATE: NORMAL
IMMUNKNOW (STIMULATED): 403 NG/ML (ref 226–524)
SERUM COLLECTION DT - LUMINEX CLASS I: NORMAL
SERUM COLLECTION DT - LUMINEX CLASS II: NORMAL

## 2019-12-03 PROCEDURE — 50200 RENAL BIOPSY PERQ: CPT | Mod: S$GLB,,, | Performed by: INTERNAL MEDICINE

## 2019-12-03 PROCEDURE — 50200 PR BIOPSY OF KIDNEY,PERCUTANEOUS: ICD-10-PCS | Mod: S$GLB,,, | Performed by: INTERNAL MEDICINE

## 2019-12-03 NOTE — TELEPHONE ENCOUNTER
----- Message from Isa Rasheed MD sent at 11/29/2019 10:14 AM CST -----  Sure, No need for further test. Please biopsy on Monday   ----- Message -----  From: Angelic Spence RN  Sent: 11/27/2019   3:15 PM CST  To: Isa Rasheed MD    No signs or symptoms of UTI.  ----- Message -----  From: Luiza Bray RN  Sent: 11/27/2019  10:15 AM CST  To: Angelic Spence RN        ----- Message -----  From: Isa Rasheed MD  Sent: 11/27/2019   9:28 AM CST  To: [facility] Post-Kidney Transplant Clinical    Is she symptomatic for UTI?

## 2019-12-04 DIAGNOSIS — R79.89 ELEVATED SERUM CREATININE: Primary | ICD-10-CM

## 2019-12-04 DIAGNOSIS — B00.9 HSV INFECTION: ICD-10-CM

## 2019-12-05 ENCOUNTER — TELEPHONE (OUTPATIENT)
Dept: TRANSPLANT | Facility: CLINIC | Age: 59
End: 2019-12-05

## 2019-12-05 ENCOUNTER — HOSPITAL ENCOUNTER (OUTPATIENT)
Dept: RADIOLOGY | Facility: HOSPITAL | Age: 59
Discharge: HOME | DRG: 920 | End: 2019-12-05
Attending: INTERNAL MEDICINE
Payer: COMMERCIAL

## 2019-12-05 ENCOUNTER — HOSPITAL ENCOUNTER (INPATIENT)
Facility: HOSPITAL | Age: 59
LOS: 2 days | Discharge: HOME | DRG: 920 | End: 2019-12-07
Attending: INTERNAL MEDICINE | Admitting: INTERNAL MEDICINE
Payer: COMMERCIAL

## 2019-12-05 DIAGNOSIS — Z94.4 STATUS POST LIVER TRANSPLANTATION: Chronic | ICD-10-CM

## 2019-12-05 DIAGNOSIS — Z94.0 STATUS POST DECEASED-DONOR KIDNEY TRANSPLANTATION: ICD-10-CM

## 2019-12-05 DIAGNOSIS — N28.9 KIDNEY DYSFUNCTION: ICD-10-CM

## 2019-12-05 DIAGNOSIS — R07.9 CHEST PAIN: ICD-10-CM

## 2019-12-05 DIAGNOSIS — R79.89 ELEVATED SERUM CREATININE: ICD-10-CM

## 2019-12-05 DIAGNOSIS — R33.8 ACUTE URINARY RETENTION: ICD-10-CM

## 2019-12-05 PROBLEM — R31.0 CLOT HEMATURIA: Status: ACTIVE | Noted: 2019-12-05

## 2019-12-05 LAB
APTT BLDCRRT: 28.2 SEC (ref 21–32)
INR PPP: 0.9 (ref 0.8–1.2)
PROTHROMBIN TIME: 9.6 SEC (ref 9–12.5)
TROPONIN I SERPL DL<=0.01 NG/ML-MCNC: 0.01 NG/ML (ref 0–0.03)

## 2019-12-05 PROCEDURE — 36415 COLL VENOUS BLD VENIPUNCTURE: CPT

## 2019-12-05 PROCEDURE — 25000003 PHARM REV CODE 250: Performed by: STUDENT IN AN ORGANIZED HEALTH CARE EDUCATION/TRAINING PROGRAM

## 2019-12-05 PROCEDURE — 20600001 HC STEP DOWN PRIVATE ROOM

## 2019-12-05 PROCEDURE — 88350 IMFLUOR EA ADDL 1ANTB STN PX: CPT | Performed by: PATHOLOGY

## 2019-12-05 PROCEDURE — 99222 PR INITIAL HOSPITAL CARE,LEVL II: ICD-10-PCS | Mod: ,,, | Performed by: INTERNAL MEDICINE

## 2019-12-05 PROCEDURE — 85610 PROTHROMBIN TIME: CPT | Mod: 91

## 2019-12-05 PROCEDURE — 84484 ASSAY OF TROPONIN QUANT: CPT

## 2019-12-05 PROCEDURE — 63600175 PHARM REV CODE 636 W HCPCS: Mod: JG | Performed by: STUDENT IN AN ORGANIZED HEALTH CARE EDUCATION/TRAINING PROGRAM

## 2019-12-05 PROCEDURE — 94761 N-INVAS EAR/PLS OXIMETRY MLT: CPT

## 2019-12-05 PROCEDURE — 63600175 PHARM REV CODE 636 W HCPCS: Performed by: INTERNAL MEDICINE

## 2019-12-05 PROCEDURE — 94640 AIRWAY INHALATION TREATMENT: CPT

## 2019-12-05 PROCEDURE — 63600175 PHARM REV CODE 636 W HCPCS: Performed by: STUDENT IN AN ORGANIZED HEALTH CARE EDUCATION/TRAINING PROGRAM

## 2019-12-05 PROCEDURE — 76776 US EXAM K TRANSPL W/DOPPLER: CPT | Mod: 26,,, | Performed by: RADIOLOGY

## 2019-12-05 PROCEDURE — 85730 THROMBOPLASTIN TIME PARTIAL: CPT

## 2019-12-05 PROCEDURE — 25000003 PHARM REV CODE 250: Performed by: INTERNAL MEDICINE

## 2019-12-05 PROCEDURE — 76776 US TRANSPLANT KIDNEY WITH DOPPLER: ICD-10-PCS | Mod: 26,,, | Performed by: RADIOLOGY

## 2019-12-05 PROCEDURE — 25000242 PHARM REV CODE 250 ALT 637 W/ HCPCS: Performed by: INTERNAL MEDICINE

## 2019-12-05 PROCEDURE — 76776 US EXAM K TRANSPL W/DOPPLER: CPT | Mod: TC

## 2019-12-05 PROCEDURE — 88348 ELECTRON MICROSCOPY DX: CPT | Performed by: PATHOLOGY

## 2019-12-05 PROCEDURE — 88346 IMFLUOR 1ST 1ANTB STAIN PX: CPT | Performed by: PATHOLOGY

## 2019-12-05 PROCEDURE — 88305 TISSUE EXAM BY PATHOLOGIST: CPT | Performed by: PATHOLOGY

## 2019-12-05 PROCEDURE — 88313 SPECIAL STAINS GROUP 2: CPT | Mod: 59 | Performed by: PATHOLOGY

## 2019-12-05 PROCEDURE — 99222 1ST HOSP IP/OBS MODERATE 55: CPT | Mod: ,,, | Performed by: INTERNAL MEDICINE

## 2019-12-05 RX ORDER — FLUTICASONE FUROATE AND VILANTEROL 100; 25 UG/1; UG/1
1 POWDER RESPIRATORY (INHALATION) DAILY
Status: DISCONTINUED | OUTPATIENT
Start: 2019-12-06 | End: 2019-12-07 | Stop reason: HOSPADM

## 2019-12-05 RX ORDER — IBUPROFEN 200 MG
24 TABLET ORAL
Status: DISCONTINUED | OUTPATIENT
Start: 2019-12-05 | End: 2019-12-07 | Stop reason: HOSPADM

## 2019-12-05 RX ORDER — HYDROMORPHONE HYDROCHLORIDE 1 MG/ML
0.5 INJECTION, SOLUTION INTRAMUSCULAR; INTRAVENOUS; SUBCUTANEOUS EVERY 6 HOURS PRN
Status: DISCONTINUED | OUTPATIENT
Start: 2019-12-05 | End: 2019-12-05

## 2019-12-05 RX ORDER — ALPRAZOLAM 0.25 MG/1
0.5 TABLET ORAL 2 TIMES DAILY PRN
Status: DISCONTINUED | OUTPATIENT
Start: 2019-12-05 | End: 2019-12-05

## 2019-12-05 RX ORDER — LANOLIN ALCOHOL/MO/W.PET/CERES
800 CREAM (GRAM) TOPICAL 2 TIMES DAILY
Status: DISCONTINUED | OUTPATIENT
Start: 2019-12-05 | End: 2019-12-07 | Stop reason: HOSPADM

## 2019-12-05 RX ORDER — OXYCODONE HYDROCHLORIDE 5 MG/1
5 TABLET ORAL EVERY 4 HOURS PRN
Status: DISCONTINUED | OUTPATIENT
Start: 2019-12-05 | End: 2019-12-07

## 2019-12-05 RX ORDER — HYDROCODONE BITARTRATE AND ACETAMINOPHEN 10; 325 MG/1; MG/1
1 TABLET ORAL ONCE
Status: COMPLETED | OUTPATIENT
Start: 2019-12-05 | End: 2019-12-05

## 2019-12-05 RX ORDER — IPRATROPIUM BROMIDE AND ALBUTEROL SULFATE 2.5; .5 MG/3ML; MG/3ML
3 SOLUTION RESPIRATORY (INHALATION) EVERY 4 HOURS PRN
Status: DISCONTINUED | OUTPATIENT
Start: 2019-12-05 | End: 2019-12-07 | Stop reason: HOSPADM

## 2019-12-05 RX ORDER — ZOLPIDEM TARTRATE 5 MG/1
5 TABLET ORAL NIGHTLY
Status: DISCONTINUED | OUTPATIENT
Start: 2019-12-05 | End: 2019-12-07 | Stop reason: HOSPADM

## 2019-12-05 RX ORDER — SODIUM CHLORIDE 0.9 % (FLUSH) 0.9 %
10 SYRINGE (ML) INJECTION
Status: DISCONTINUED | OUTPATIENT
Start: 2019-12-05 | End: 2019-12-07 | Stop reason: HOSPADM

## 2019-12-05 RX ORDER — LANOLIN ALCOHOL/MO/W.PET/CERES
400 CREAM (GRAM) TOPICAL 2 TIMES DAILY
Status: DISCONTINUED | OUTPATIENT
Start: 2019-12-05 | End: 2019-12-05

## 2019-12-05 RX ORDER — ONDANSETRON 8 MG/1
8 TABLET, ORALLY DISINTEGRATING ORAL EVERY 6 HOURS PRN
Status: DISCONTINUED | OUTPATIENT
Start: 2019-12-05 | End: 2019-12-07 | Stop reason: HOSPADM

## 2019-12-05 RX ORDER — MYCOPHENOLATE MOFETIL 250 MG/1
750 CAPSULE ORAL 2 TIMES DAILY
Status: DISCONTINUED | OUTPATIENT
Start: 2019-12-05 | End: 2019-12-07 | Stop reason: HOSPADM

## 2019-12-05 RX ORDER — LANOLIN ALCOHOL/MO/W.PET/CERES
1200 CREAM (GRAM) TOPICAL 2 TIMES DAILY
Status: DISCONTINUED | OUTPATIENT
Start: 2019-12-05 | End: 2019-12-05

## 2019-12-05 RX ORDER — ACETYLCYSTEINE 200 MG/ML
2 SOLUTION ORAL; RESPIRATORY (INHALATION) 2 TIMES DAILY
Status: DISCONTINUED | OUTPATIENT
Start: 2019-12-05 | End: 2019-12-07 | Stop reason: HOSPADM

## 2019-12-05 RX ORDER — AMLODIPINE BESYLATE 5 MG/1
5 TABLET ORAL DAILY
Status: DISCONTINUED | OUTPATIENT
Start: 2019-12-06 | End: 2019-12-07 | Stop reason: HOSPADM

## 2019-12-05 RX ORDER — LANOLIN ALCOHOL/MO/W.PET/CERES
800 CREAM (GRAM) TOPICAL 2 TIMES DAILY
Status: DISCONTINUED | OUTPATIENT
Start: 2019-12-05 | End: 2019-12-05

## 2019-12-05 RX ORDER — HYDROMORPHONE HYDROCHLORIDE 1 MG/ML
1 INJECTION, SOLUTION INTRAMUSCULAR; INTRAVENOUS; SUBCUTANEOUS EVERY 4 HOURS PRN
Status: DISCONTINUED | OUTPATIENT
Start: 2019-12-05 | End: 2019-12-06

## 2019-12-05 RX ORDER — ACETAMINOPHEN 325 MG/1
650 TABLET ORAL EVERY 6 HOURS PRN
Status: DISCONTINUED | OUTPATIENT
Start: 2019-12-05 | End: 2019-12-07 | Stop reason: HOSPADM

## 2019-12-05 RX ORDER — GLUCAGON 1 MG
1 KIT INJECTION
Status: DISCONTINUED | OUTPATIENT
Start: 2019-12-05 | End: 2019-12-07 | Stop reason: HOSPADM

## 2019-12-05 RX ORDER — IBUPROFEN 200 MG
16 TABLET ORAL
Status: DISCONTINUED | OUTPATIENT
Start: 2019-12-05 | End: 2019-12-07 | Stop reason: HOSPADM

## 2019-12-05 RX ORDER — PREDNISONE 5 MG/1
5 TABLET ORAL DAILY
Status: DISCONTINUED | OUTPATIENT
Start: 2019-12-06 | End: 2019-12-07 | Stop reason: HOSPADM

## 2019-12-05 RX ORDER — ALPRAZOLAM 0.5 MG/1
0.5 TABLET ORAL 2 TIMES DAILY
Status: DISCONTINUED | OUTPATIENT
Start: 2019-12-05 | End: 2019-12-07 | Stop reason: HOSPADM

## 2019-12-05 RX ORDER — FLUOXETINE HYDROCHLORIDE 20 MG/1
20 CAPSULE ORAL DAILY
Status: DISCONTINUED | OUTPATIENT
Start: 2019-12-06 | End: 2019-12-07 | Stop reason: HOSPADM

## 2019-12-05 RX ORDER — ONDANSETRON 4 MG/1
4 TABLET, ORALLY DISINTEGRATING ORAL ONCE
Status: COMPLETED | OUTPATIENT
Start: 2019-12-05 | End: 2019-12-05

## 2019-12-05 RX ORDER — VALACYCLOVIR HYDROCHLORIDE 500 MG/1
1000 TABLET, FILM COATED ORAL DAILY
Status: DISCONTINUED | OUTPATIENT
Start: 2019-12-06 | End: 2019-12-05

## 2019-12-05 RX ORDER — PANTOPRAZOLE SODIUM 40 MG/1
40 TABLET, DELAYED RELEASE ORAL DAILY
Status: DISCONTINUED | OUTPATIENT
Start: 2019-12-06 | End: 2019-12-07 | Stop reason: HOSPADM

## 2019-12-05 RX ORDER — ACETAMINOPHEN 500 MG
1000 TABLET ORAL ONCE AS NEEDED
Status: DISCONTINUED | OUTPATIENT
Start: 2019-12-05 | End: 2019-12-05

## 2019-12-05 RX ORDER — LORAZEPAM 0.5 MG/1
0.5 TABLET ORAL ONCE AS NEEDED
Status: COMPLETED | OUTPATIENT
Start: 2019-12-05 | End: 2019-12-05

## 2019-12-05 RX ORDER — LOPERAMIDE HYDROCHLORIDE 2 MG/1
2 CAPSULE ORAL DAILY PRN
Status: DISCONTINUED | OUTPATIENT
Start: 2019-12-05 | End: 2019-12-07 | Stop reason: HOSPADM

## 2019-12-05 RX ORDER — TACROLIMUS 1 MG/1
2 CAPSULE ORAL 2 TIMES DAILY
Status: DISCONTINUED | OUTPATIENT
Start: 2019-12-05 | End: 2019-12-06

## 2019-12-05 RX ORDER — PROMETHAZINE HYDROCHLORIDE 12.5 MG/1
12.5 TABLET ORAL EVERY 6 HOURS PRN
Status: DISCONTINUED | OUTPATIENT
Start: 2019-12-05 | End: 2019-12-07 | Stop reason: HOSPADM

## 2019-12-05 RX ORDER — HYDROMORPHONE HYDROCHLORIDE 1 MG/ML
0.5 INJECTION, SOLUTION INTRAMUSCULAR; INTRAVENOUS; SUBCUTANEOUS ONCE
Status: COMPLETED | OUTPATIENT
Start: 2019-12-05 | End: 2019-12-05

## 2019-12-05 RX ORDER — SODIUM CHLORIDE 9 MG/ML
INJECTION, SOLUTION INTRAVENOUS CONTINUOUS
Status: DISCONTINUED | OUTPATIENT
Start: 2019-12-05 | End: 2019-12-07 | Stop reason: HOSPADM

## 2019-12-05 RX ADMIN — HYDROMORPHONE HYDROCHLORIDE 0.5 MG: 1 INJECTION, SOLUTION INTRAMUSCULAR; INTRAVENOUS; SUBCUTANEOUS at 06:12

## 2019-12-05 RX ADMIN — IPRATROPIUM BROMIDE AND ALBUTEROL SULFATE 3 ML: .5; 3 SOLUTION RESPIRATORY (INHALATION) at 08:12

## 2019-12-05 RX ADMIN — HYDROMORPHONE HYDROCHLORIDE 1 MG: 1 INJECTION, SOLUTION INTRAMUSCULAR; INTRAVENOUS; SUBCUTANEOUS at 09:12

## 2019-12-05 RX ADMIN — TACROLIMUS 2 MG: 1 CAPSULE ORAL at 11:12

## 2019-12-05 RX ADMIN — ACETYLCYSTEINE 2 ML: 200 INHALANT RESPIRATORY (INHALATION) at 08:12

## 2019-12-05 RX ADMIN — SODIUM CHLORIDE: 0.9 INJECTION, SOLUTION INTRAVENOUS at 08:12

## 2019-12-05 RX ADMIN — MYCOPHENOLATE MOFETIL 750 MG: 250 CAPSULE ORAL at 11:12

## 2019-12-05 RX ADMIN — ALPRAZOLAM 0.5 MG: 0.25 TABLET ORAL at 08:12

## 2019-12-05 RX ADMIN — MAGNESIUM OXIDE TAB 400 MG (241.3 MG ELEMENTAL MG) 800 MG: 400 (241.3 MG) TAB at 08:12

## 2019-12-05 RX ADMIN — ZOLPIDEM TARTRATE 5 MG: 5 TABLET ORAL at 11:12

## 2019-12-05 RX ADMIN — DESMOPRESSIN ACETATE 7.89 MCG: 4 SOLUTION INTRAVENOUS at 01:12

## 2019-12-05 RX ADMIN — HYDROCODONE BITARTRATE AND ACETAMINOPHEN 1 TABLET: 10; 325 TABLET ORAL at 04:12

## 2019-12-05 RX ADMIN — ONDANSETRON 4 MG: 4 TABLET, ORALLY DISINTEGRATING ORAL at 04:12

## 2019-12-05 RX ADMIN — HYDROMORPHONE HYDROCHLORIDE 0.5 MG: 1 INJECTION, SOLUTION INTRAMUSCULAR; INTRAVENOUS; SUBCUTANEOUS at 07:12

## 2019-12-05 RX ADMIN — LORAZEPAM 0.5 MG: 0.5 TABLET ORAL at 01:12

## 2019-12-05 NOTE — H&P
[facility] Hospital Medicine  History & Physical    Patient Name: Virginia Lentz  MRN: [id number]  Admission Date: 2019  Attending Physician: Shalom Benavidez MD   Primary Care Provider: Melissa Garcias MD    [facility] Medicine Team: [facility] HOSP MED D Shalom Benavidez MD     Patient information was obtained from patient and ER records.     Subjective:     Principal Problem:Acute urinary retention    Chief Complaint: No chief complaint on file.       HPI:   Ms. 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 She denies taking blood thinners, NSAID use, or medication nonadherence. She also denies fever, chills, chest pain, shortness of breath, abdominal pain or lower extremity swelling. Admitted to Hospital medicine service for evaluation and management of urinary retention.        Past Medical History:   Diagnosis Date    ARSEN (acute kidney injury) 2019    Alcoholic cirrhosis 7/10/2012    CKD (chronic kidney disease) stage 3, GFR 30-59 ml/min     -donor kidney transplant 7/10/2012    Essential hypertension 10/22/2018    H/O recurrent pneumonia 10/22/2018    Hypocitraturic calcium nephrolithiasis 2013 24 urine showed low urine citrate.    Hypothyroidism 7/10/2012    Liver replaced by transplant 7/10/2012    Need for prophylactic immunotherapy 7/10/2012    Pancreatitis  Aug 2011    Pulmonary embolism 10/22/2018    Recurrent nephrolithiasis 7/10/2012    Recurrent pneumonia     last episode 2018       Past Surgical History:    Procedure Laterality Date    APPENDECTOMY       SECTION      KIDNEY TRANSPLANT      LIVER TRANSPLANT      TONSILLECTOMY      TRACHEOSTOMY CLOSURE  Mar 2012    TRACHEOSTOMY TUBE PLACEMENT         Review of patient's allergies indicates:   Allergen Reactions    Levaquin [levofloxacin]      Tendon tenderness       No current facility-administered medications on file prior to encounter.      Current Outpatient Medications on File Prior to Encounter   Medication Sig    alprazolam (XANAX) 0.25 MG tablet Take 0.5 mg by mouth Twice daily.     amLODIPine (NORVASC) 5 MG tablet Take 1 tablet (5 mg total) by mouth once daily.    biotin 500 mcg Cap Take 1 mg by mouth Daily. 1  Oral Every day    budesonide-formoterol 160-4.5 mcg (SYMBICORT) 160-4.5 mcg/actuation HFAA 2 puffs 2 (two) times daily.    calcitonin, salmon, (FORTICAL) 200 unit/actuation nasal spray 1 spray by Nasal route daily as needed.    FLUoxetine 20 MG capsule Take 1 capsule by mouth once daily.    magnesium oxide (MAG-OX) 400 mg tablet Take 3 tablets by mouth 2 (two) times daily.     multivitamin (THERAGRAN) per tablet Take by mouth Daily. 1 Tablet Oral Every day.  Over-the-counter Multivitamin    mycophenolate (CELLCEPT) 250 mg Cap TAKE 3 CAPSULES TWICE DAILY    NEXIUM 40 mg capsule Take 40 mg by mouth 2 (two) times daily.     predniSONE (DELTASONE) 10 MG tablet Take 5 mg by mouth Daily. Half Tablet Oral as directed    tacrolimus (PROGRAF) 1 MG Cap Take 2 capsules (2 mg total) by mouth every morning AND 2 capsules (2 mg total) every evening. DOSE CHANGE.    zolpidem (AMBIEN) 5 MG Tab Take 5 mg by mouth every evening. 1 Tablet Oral At bedtime    acetylcysteine 200 mg/ml, 20%, (MUCOMYST) 200 mg/mL (20 %) nebulizer solution 2 (two) times daily.    albuterol-ipratropium (DUO-NEB) 2.5 mg-0.5 mg/3 mL nebulizer solution 3 (three) times daily as needed.    aspirin (ECOTRIN) 81 MG EC tablet Take 81 mg by mouth Daily. 1 Tablet, Delayed  Release (E.C.) Oral Every day.      ipratropium-albuterol (COMBIVENT RESPIMAT)  mcg/actuation inhaler 3 (three) times daily as needed.    loperamide (IMODIUM) 2 mg capsule Take 2 mg by mouth daily as needed. 1 Capsule Oral     ondansetron (ZOFRAN ODT) 8 MG TbDL Take 8 mg by mouth Use as needed. 1 Tablet, Rapid Dissolve Oral Every 12 hours    promethazine (PHENERGAN) 12.5 MG Tab Take 1 tablet by mouth every 6 (six) hours as needed.    valACYclovir (VALTREX) 1000 MG tablet Take 1 tablet by mouth once daily.    [DISCONTINUED] montelukast (SINGULAIR) 10 mg tablet Take 1 tablet by mouth daily as needed.    [DISCONTINUED] sertraline (ZOLOFT) 25 MG tablet Take 100 mg by mouth once daily. 1 Tablet Oral Every day     Family History     None        Tobacco Use    Smoking status: Never Smoker    Smokeless tobacco: Never Used   Substance and Sexual Activity    Alcohol use: No     Frequency: Never    Drug use: Not on file    Sexual activity: Not on file     Review of Systems   Constitutional: Negative for chills and fever.   Eyes: Negative for visual disturbance.   Respiratory: Negative for cough and shortness of breath.    Cardiovascular: Negative for chest pain, palpitations and leg swelling.   Gastrointestinal: Positive for abdominal pain and nausea. Negative for constipation, diarrhea and vomiting.   Genitourinary: Positive for hematuria. Negative for dysuria.   Musculoskeletal: Positive for myalgias.   Skin: Positive for rash. Negative for wound.   Neurological: Positive for headaches. Negative for dizziness, weakness and light-headedness.     Objective:     Vital Signs (Most Recent):  Temp: 98.1 °F (36.7 °C) (12/05/19 1650)  Pulse: 82 (12/05/19 1800)  Resp: 16 (12/05/19 1800)  BP: 121/71 (12/05/19 1800)  SpO2: 100 % (12/05/19 1800) Vital Signs (24h Range):  Temp:  [97.7 °F (36.5 °C)-98.1 °F (36.7 °C)] 98.1 °F (36.7 °C)  Pulse:  [75-83] 82  Resp:  [16-18] 16  SpO2:  [99 %-100 %] 100 %  BP:  (119-142)/(60-71) 121/71     Weight: 52.6 kg (116 lb)  Body mass index is 22.65 kg/m².    Physical Exam   Constitutional: She is oriented to person, place, and time. She appears well-developed.   HENT:   Head: Normocephalic and atraumatic.   Eyes: Pupils are equal, round, and reactive to light. Conjunctivae are normal.   Neck: Normal range of motion.   Cardiovascular: Normal rate and regular rhythm.   Pulmonary/Chest: Effort normal and breath sounds normal.   Abdominal: Soft. Bowel sounds are normal. There is tenderness.   Musculoskeletal: Normal range of motion.   Neurological: She is alert and oriented to person, place, and time.   Skin: Skin is warm.   Psychiatric: She has a normal mood and affect.         CRANIAL NERVES     CN III, IV, VI   Pupils are equal, round, and reactive to light.      Significant Labs:   A1C: No results for input(s): HGBA1C in the last 4320 hours.  CBC:   Recent Labs   Lab 12/05/19  1048   WBC 6.30   HGB 10.5*   HCT 34.5*   *     CMP:   Recent Labs   Lab 12/05/19  1048      K 4.1      CO2 19*      BUN 38*   CREATININE 2.1*   CALCIUM 9.3   ALBUMIN 3.9   ANIONGAP 10   EGFRNONAA 25.0*     Coagulation:   Recent Labs   Lab 12/05/19  1048   INR 0.9       Significant Imaging: I have reviewed and interpreted all pertinent imaging results/findings within the past 24 hours.    Assessment/Plan:     Active Diagnoses:    Diagnosis Date Noted POA    PRINCIPAL PROBLEM:  Acute urinary retention [R33.8] 12/05/2019 Unknown    Kidney dysfunction [N28.9] 12/05/2019 Yes    Clot hematuria [R31.0] 12/05/2019 Unknown      Problems Resolved During this Admission:     Scheduled Meds:   [START ON 12/6/2019] amLODIPine  5 mg Oral Daily    [START ON 12/6/2019] FLUoxetine  20 mg Oral Daily    [START ON 12/6/2019] fluticasone furoate-vilanterol  1 puff Inhalation Daily    magnesium oxide  1,200 mg Oral BID    [START ON 12/6/2019] multivitamin  1 tablet Oral Daily    mycophenolate   750 mg Oral BID    [START ON 12/6/2019] pantoprazole  40 mg Oral Daily    [START ON 12/6/2019] predniSONE  5 mg Oral Daily    tacrolimus  2 mg Oral BID    zolpidem  5 mg Oral QHS     Continuous Infusions:   sodium chloride 0.9%       PRN Meds:.acetaminophen, albuterol-ipratropium, ALPRAZolam, Dextrose 10% Bolus, Dextrose 10% Bolus, glucagon (human recombinant), glucose, glucose, HYDROmorphone, loperamide, ondansetron, oxyCODONE, promethazine, sodium chloride 0.9%      PLAN:    #Urinary retention  #Hematuria/clot  - Renal US during biopsy revealed hematoma in urinary bladder  - landers ordered  - strict I/Os  - urology consulted. followup recs  - transplant nephrology consulted. Follow up recs  - nephrology consulted. followup recs   - holding home aspirin     ARSEN on CKD  - b/l Scr 1.3-1.6  - Scr: 2.1  - continue with IVF  - transplant nephrology and nephrology consulted  - holding home aspirin   - avoid nephrotoxic agents  - renally dose medications  - strict I/Os  - followup renal biopsy     #s/p kidney-liver transplant  #Immunosuppression  - continue home tacrolimus, prednisone and Cellcept  - consulted transplant nephrology  - followup renal biopsy     #HTN  - continue home amlodipine     #Insomnia  #Depression  #Anxiety   - continue Ambien   - continue Fluoxetine  - continue xanax BID    #h/o recurrent pneumonia   #chronic cough  - Symbicort at home  - continue Breo inhaler   - mucomyst BID   - duoneb prn     VTE Risk Mitigation (From admission, onward)         Ordered     Place sequential compression device  Until discontinued      12/05/19 1746     IP VTE HIGH RISK PATIENT  Once      12/05/19 1746              Time spent in care of patient: > 35 minutes     Shalom Benavidez MD  Department of Hospital Medicine   [facility]

## 2019-12-05 NOTE — PROCEDURES
Radiology Post-Procedure Note    Pre Op Diagnosis: Renal transplant dysfunction.  Post Op Diagnosis: Same    Procedure: Renal Transplant Biopsy.    Procedure performed by: Jhony Campos MD    Written Informed Consent Obtained: Yes  Specimen Removed: YES   Estimated Blood Loss: Minimal    Findings:   Renal transplant biopsy. Small amount of blood adjacent to the kidney as well as in the bladder. Monitored with serial ultrasounds over 2 hours with no change in size. Patient tolerated procedure well. Spoke with nephrology who will admit patient to Internal Medicine.      Brice Randhawa MD  PGY-3 Radiology Resident  [address] [facility]  Pager: [phone]

## 2019-12-05 NOTE — TELEPHONE ENCOUNTER
----- Message from Qi Peñaloza RN sent at 12/5/2019  4:43 PM CST -----      ----- Message -----  From: Isa Rasheed MD  Sent: 12/5/2019   1:49 PM CST  To: [facility] Post-Kidney Transplant Clinical    Sodium bicarbonate 1300 mg BID. Pending biopsy

## 2019-12-05 NOTE — H&P
Vascular and Interventional Radiology History & Physical    Date:  2019    Chief Complaint:   Kidney dysfunction    History of Present Illness:  Virginia Lentz is a 59 y.o. female who presents for biopsy of her kidney transplant. She has hx of prior rejection and ARF.     Past Medical History:  Past Medical History:   Diagnosis Date    ARSEN (acute kidney injury) 2019    Alcoholic cirrhosis 7/10/2012    CKD (chronic kidney disease) stage 3, GFR 30-59 ml/min     -donor kidney transplant 7/10/2012    Essential hypertension 10/22/2018    H/O recurrent pneumonia 10/22/2018    Hypocitraturic calcium nephrolithiasis 2013 24 urine showed low urine citrate.    Hypothyroidism 7/10/2012    Liver replaced by transplant 7/10/2012    Need for prophylactic immunotherapy 7/10/2012    Pancreatitis  Aug 2011    Pulmonary embolism 10/22/2018    Recurrent nephrolithiasis 7/10/2012    Recurrent pneumonia     last episode 2018       Past Surgical History:  Past Surgical History:   Procedure Laterality Date    APPENDECTOMY       SECTION      KIDNEY TRANSPLANT      LIVER TRANSPLANT      TONSILLECTOMY      TRACHEOSTOMY CLOSURE  Mar 2012    TRACHEOSTOMY TUBE PLACEMENT          Sedation History:    Denies any adverse reactions.     Social History:  Social History     Tobacco Use    Smoking status: Never Smoker    Smokeless tobacco: Never Used   Substance Use Topics    Alcohol use: No     Frequency: Never    Drug use: Not on file        Home Medications:   Prior to Admission medications    Medication Sig Start Date End Date Taking? Authorizing Provider   alprazolam (XANAX) 0.25 MG tablet Take 0.5 mg by mouth Twice daily.    Yes Historical Provider, MD   amLODIPine (NORVASC) 5 MG tablet Take 1 tablet (5 mg total) by mouth once daily. 19  Yes Ceci Theodore NP   biotin 500 mcg Cap Take 1 mg by mouth Daily. 1  Oral Every day   Yes Historical Provider, MD    budesonide-formoterol 160-4.5 mcg (SYMBICORT) 160-4.5 mcg/actuation HFAA 2 puffs 2 (two) times daily.   Yes Historical Provider, MD   calcitonin, salmon, (FORTICAL) 200 unit/actuation nasal spray 1 spray by Nasal route daily as needed. 8/13/19  Yes Historical Provider, MD   FLUoxetine 20 MG capsule Take 1 capsule by mouth once daily. 10/29/19  Yes Historical Provider, MD   magnesium oxide (MAG-OX) 400 mg tablet Take 3 tablets by mouth 2 (two) times daily.  10/9/14  Yes Historical Provider, MD   multivitamin (THERAGRAN) per tablet Take by mouth Daily. 1 Tablet Oral Every day.  Over-the-counter Multivitamin   Yes Historical Provider, MD   mycophenolate (CELLCEPT) 250 mg Cap TAKE 3 CAPSULES TWICE DAILY 10/21/19  Yes Ruben Ferrell MD   NEXIUM 40 mg capsule Take 40 mg by mouth 2 (two) times daily.  10/5/14  Yes Historical Provider, MD   predniSONE (DELTASONE) 10 MG tablet Take 5 mg by mouth Daily. Half Tablet Oral as directed   Yes Historical Provider, MD   tacrolimus (PROGRAF) 1 MG Cap Take 2 capsules (2 mg total) by mouth every morning AND 2 capsules (2 mg total) every evening. DOSE CHANGE. 11/25/19  Yes Ceci Theodore NP   zolpidem (AMBIEN) 5 MG Tab Take 5 mg by mouth every evening. 1 Tablet Oral At bedtime   Yes Historical Provider, MD   acetylcysteine 200 mg/ml, 20%, (MUCOMYST) 200 mg/mL (20 %) nebulizer solution 2 (two) times daily. 11/20/19   Historical Provider, MD   albuterol-ipratropium (DUO-NEB) 2.5 mg-0.5 mg/3 mL nebulizer solution 3 (three) times daily as needed.    Historical Provider, MD   aspirin (ECOTRIN) 81 MG EC tablet Take 81 mg by mouth Daily. 1 Tablet, Delayed Release (E.C.) Oral Every day.      Historical Provider, MD   ipratropium-albuterol (COMBIVENT RESPIMAT)  mcg/actuation inhaler 3 (three) times daily as needed.    Historical Provider, MD   loperamide (IMODIUM) 2 mg capsule Take 2 mg by mouth daily as needed. 1 Capsule Oral     Historical Provider, MD   ondansetron (ZOFRAN  ODT) 8 MG TbDL Take 8 mg by mouth Use as needed. 1 Tablet, Rapid Dissolve Oral Every 12 hours    Historical Provider, MD   promethazine (PHENERGAN) 12.5 MG Tab Take 1 tablet by mouth every 6 (six) hours as needed. 10/28/19   Historical Provider, MD   valACYclovir (VALTREX) 1000 MG tablet Take 1 tablet by mouth once daily. 11/19/19 11/29/19  Historical Provider, MD   montelukast (SINGULAIR) 10 mg tablet Take 1 tablet by mouth daily as needed. 9/23/19 12/5/19  Historical Provider, MD   sertraline (ZOLOFT) 25 MG tablet Take 100 mg by mouth once daily. 1 Tablet Oral Every day  12/5/19  Historical Provider, MD       Inpatient Medications:  No current facility-administered medications for this encounter.      Anticoagulants/Antiplatelets:   aspirin    Allergies:   Review of patient's allergies indicates:   Allergen Reactions    Levaquin [levofloxacin]      Tendon tenderness       Review of Systems:   As documented in primary provider H&P.    Vital Signs (Most Recent):  Temp: 97.7 °F (36.5 °C) (12/05/19 1316)  Pulse: 80 (12/05/19 1316)  Resp: 17 (12/05/19 1316)  BP: (!) 142/67 (12/05/19 1316)  SpO2: 99 % (12/05/19 1316)    Physical Exam:  No acute distress, laying comfortably in bed, pleasant and cooperative  Regular rate and rhythm  Breathing unlabored  Abdomen benign  Extremities warm and well perfused    Sedation Exam:  ASA: III - Patient appears to have severe systemic disease not posing a constant threat to life   Mallampati: II (hard and soft palate, upper portion of tonsils anduvula visible)     Laboratory  Lab Results   Component Value Date    INR 0.9 12/05/2019       Lab Results   Component Value Date    WBC 6.30 12/05/2019    HGB 10.5 (L) 12/05/2019    HCT 34.5 (L) 12/05/2019    MCV 92 12/05/2019     (H) 12/05/2019      Lab Results   Component Value Date     12/05/2019     12/05/2019    K 4.1 12/05/2019     12/05/2019    CO2 19 (L) 12/05/2019    BUN 38 (H) 12/05/2019    CREATININE 2.1  (H) 12/05/2019    CALCIUM 9.3 12/05/2019    MG 1.5 (L) 03/09/2012    ALT 8 (L) 11/26/2019    AST 9 (L) 11/26/2019    ALBUMIN 3.9 12/05/2019    BILITOT 0.6 11/26/2019    BILIDIR 0.2 05/30/2012       ASSESSMENT/PLAN:              Sedation Plan: Local  Patient will undergo: Biopsy of kidney transplant    Ramin Ortiz MD  Radiology PGY-2

## 2019-12-06 ENCOUNTER — PATIENT MESSAGE (OUTPATIENT)
Dept: DERMATOLOGY | Facility: CLINIC | Age: 59
End: 2019-12-06

## 2019-12-06 LAB
ALBUMIN SERPL BCP-MCNC: 3.2 G/DL (ref 3.5–5.2)
ALP SERPL-CCNC: 82 U/L (ref 55–135)
ALT SERPL W/O P-5'-P-CCNC: 22 U/L (ref 10–44)
ANION GAP SERPL CALC-SCNC: 9 MMOL/L (ref 8–16)
AST SERPL-CCNC: 71 U/L (ref 10–40)
BACTERIA #/AREA URNS AUTO: ABNORMAL /HPF
BASOPHILS # BLD AUTO: 0.03 K/UL (ref 0–0.2)
BASOPHILS NFR BLD: 0.5 % (ref 0–1.9)
BILIRUB SERPL-MCNC: 0.4 MG/DL (ref 0.1–1)
BILIRUB UR QL STRIP: NEGATIVE
BUN SERPL-MCNC: 35 MG/DL (ref 6–20)
CALCIUM SERPL-MCNC: 8.2 MG/DL (ref 8.7–10.5)
CHLORIDE SERPL-SCNC: 110 MMOL/L (ref 95–110)
CLARITY UR REFRACT.AUTO: CLEAR
CO2 SERPL-SCNC: 20 MMOL/L (ref 23–29)
COLOR UR AUTO: ABNORMAL
CREAT SERPL-MCNC: 1.9 MG/DL (ref 0.5–1.4)
DIFFERENTIAL METHOD: ABNORMAL
EOSINOPHIL # BLD AUTO: 0 K/UL (ref 0–0.5)
EOSINOPHIL NFR BLD: 0.5 % (ref 0–8)
ERYTHROCYTE [DISTWIDTH] IN BLOOD BY AUTOMATED COUNT: 15.9 % (ref 11.5–14.5)
EST. GFR  (AFRICAN AMERICAN): 32.6 ML/MIN/1.73 M^2
EST. GFR  (NON AFRICAN AMERICAN): 28.3 ML/MIN/1.73 M^2
GLUCOSE SERPL-MCNC: 84 MG/DL (ref 70–110)
GLUCOSE UR QL STRIP: NEGATIVE
HCT VFR BLD AUTO: 29.6 % (ref 37–48.5)
HGB BLD-MCNC: 8.8 G/DL (ref 12–16)
HGB UR QL STRIP: ABNORMAL
IMM GRANULOCYTES # BLD AUTO: 0.03 K/UL (ref 0–0.04)
IMM GRANULOCYTES NFR BLD AUTO: 0.5 % (ref 0–0.5)
KETONES UR QL STRIP: NEGATIVE
LEUKOCYTE ESTERASE UR QL STRIP: ABNORMAL
LYMPHOCYTES # BLD AUTO: 1.1 K/UL (ref 1–4.8)
LYMPHOCYTES NFR BLD: 17.3 % (ref 18–48)
MAGNESIUM SERPL-MCNC: 1.5 MG/DL (ref 1.6–2.6)
MCH RBC QN AUTO: 27.6 PG (ref 27–31)
MCHC RBC AUTO-ENTMCNC: 29.7 G/DL (ref 32–36)
MCV RBC AUTO: 93 FL (ref 82–98)
MICROSCOPIC COMMENT: ABNORMAL
MONOCYTES # BLD AUTO: 0.6 K/UL (ref 0.3–1)
MONOCYTES NFR BLD: 10.2 % (ref 4–15)
NEUTROPHILS # BLD AUTO: 4.3 K/UL (ref 1.8–7.7)
NEUTROPHILS NFR BLD: 71 % (ref 38–73)
NITRITE UR QL STRIP: NEGATIVE
NRBC BLD-RTO: 0 /100 WBC
PH UR STRIP: 6 [PH] (ref 5–8)
PHOSPHATE SERPL-MCNC: 3.9 MG/DL (ref 2.7–4.5)
PLATELET # BLD AUTO: 356 K/UL (ref 150–350)
PMV BLD AUTO: 9.2 FL (ref 9.2–12.9)
POTASSIUM SERPL-SCNC: 3.5 MMOL/L (ref 3.5–5.1)
PROT SERPL-MCNC: 6.2 G/DL (ref 6–8.4)
PROT UR QL STRIP: NEGATIVE
RBC # BLD AUTO: 3.19 M/UL (ref 4–5.4)
RBC #/AREA URNS AUTO: 95 /HPF (ref 0–4)
SODIUM SERPL-SCNC: 139 MMOL/L (ref 136–145)
SP GR UR STRIP: 1 (ref 1–1.03)
TACROLIMUS BLD-MCNC: 10.3 NG/ML (ref 5–15)
URN SPEC COLLECT METH UR: ABNORMAL
WBC # BLD AUTO: 6.07 K/UL (ref 3.9–12.7)
WBC #/AREA URNS AUTO: 12 /HPF (ref 0–5)

## 2019-12-06 PROCEDURE — 87086 URINE CULTURE/COLONY COUNT: CPT

## 2019-12-06 PROCEDURE — 83735 ASSAY OF MAGNESIUM: CPT

## 2019-12-06 PROCEDURE — 25000003 PHARM REV CODE 250: Performed by: INTERNAL MEDICINE

## 2019-12-06 PROCEDURE — 87088 URINE BACTERIA CULTURE: CPT

## 2019-12-06 PROCEDURE — 99223 1ST HOSP IP/OBS HIGH 75: CPT | Mod: ,,, | Performed by: INTERNAL MEDICINE

## 2019-12-06 PROCEDURE — 84100 ASSAY OF PHOSPHORUS: CPT

## 2019-12-06 PROCEDURE — 94761 N-INVAS EAR/PLS OXIMETRY MLT: CPT

## 2019-12-06 PROCEDURE — 94640 AIRWAY INHALATION TREATMENT: CPT

## 2019-12-06 PROCEDURE — 80197 ASSAY OF TACROLIMUS: CPT

## 2019-12-06 PROCEDURE — 80053 COMPREHEN METABOLIC PANEL: CPT

## 2019-12-06 PROCEDURE — 99232 SBSQ HOSP IP/OBS MODERATE 35: CPT | Mod: ,,, | Performed by: INTERNAL MEDICINE

## 2019-12-06 PROCEDURE — 63600175 PHARM REV CODE 636 W HCPCS: Performed by: INTERNAL MEDICINE

## 2019-12-06 PROCEDURE — 81001 URINALYSIS AUTO W/SCOPE: CPT

## 2019-12-06 PROCEDURE — 36415 COLL VENOUS BLD VENIPUNCTURE: CPT

## 2019-12-06 PROCEDURE — 85025 COMPLETE CBC W/AUTO DIFF WBC: CPT

## 2019-12-06 PROCEDURE — 20600001 HC STEP DOWN PRIVATE ROOM

## 2019-12-06 PROCEDURE — 87186 SC STD MICRODIL/AGAR DIL: CPT

## 2019-12-06 PROCEDURE — 87077 CULTURE AEROBIC IDENTIFY: CPT

## 2019-12-06 PROCEDURE — 99232 PR SUBSEQUENT HOSPITAL CARE,LEVL II: ICD-10-PCS | Mod: ,,, | Performed by: INTERNAL MEDICINE

## 2019-12-06 PROCEDURE — 99223 PR INITIAL HOSPITAL CARE,LEVL III: ICD-10-PCS | Mod: ,,, | Performed by: INTERNAL MEDICINE

## 2019-12-06 PROCEDURE — 25000242 PHARM REV CODE 250 ALT 637 W/ HCPCS: Performed by: INTERNAL MEDICINE

## 2019-12-06 RX ORDER — ERGOCALCIFEROL 1.25 MG/1
50000 CAPSULE ORAL
COMMUNITY
End: 2020-12-15 | Stop reason: SDUPTHER

## 2019-12-06 RX ORDER — IBANDRONATE SODIUM 150 MG/1
150 TABLET, FILM COATED ORAL
COMMUNITY
End: 2022-01-24

## 2019-12-06 RX ORDER — HYDROMORPHONE HYDROCHLORIDE 1 MG/ML
1 INJECTION, SOLUTION INTRAMUSCULAR; INTRAVENOUS; SUBCUTANEOUS
Status: DISCONTINUED | OUTPATIENT
Start: 2019-12-06 | End: 2019-12-06

## 2019-12-06 RX ORDER — GABAPENTIN 300 MG/1
300 CAPSULE ORAL DAILY PRN
Status: DISCONTINUED | OUTPATIENT
Start: 2019-12-06 | End: 2019-12-07 | Stop reason: HOSPADM

## 2019-12-06 RX ORDER — LEVOTHYROXINE SODIUM 25 UG/1
25 TABLET ORAL
Status: DISCONTINUED | OUTPATIENT
Start: 2019-12-07 | End: 2019-12-07 | Stop reason: HOSPADM

## 2019-12-06 RX ORDER — LEVOTHYROXINE SODIUM 25 UG/1
25 TABLET ORAL DAILY
COMMUNITY
End: 2022-01-24

## 2019-12-06 RX ORDER — VALACYCLOVIR HYDROCHLORIDE 500 MG/1
500 TABLET, FILM COATED ORAL DAILY
Qty: 30 TABLET | Refills: 11 | Status: SHIPPED | OUTPATIENT
Start: 2019-12-06 | End: 2019-12-07 | Stop reason: HOSPADM

## 2019-12-06 RX ORDER — HYDROMORPHONE HYDROCHLORIDE 1 MG/ML
1 INJECTION, SOLUTION INTRAMUSCULAR; INTRAVENOUS; SUBCUTANEOUS
Status: DISCONTINUED | OUTPATIENT
Start: 2019-12-06 | End: 2019-12-07

## 2019-12-06 RX ORDER — SODIUM BICARBONATE 650 MG/1
1300 TABLET ORAL 2 TIMES DAILY
Status: DISCONTINUED | OUTPATIENT
Start: 2019-12-06 | End: 2019-12-07 | Stop reason: HOSPADM

## 2019-12-06 RX ORDER — TACROLIMUS 1 MG/1
1 CAPSULE ORAL 2 TIMES DAILY
Status: DISCONTINUED | OUTPATIENT
Start: 2019-12-06 | End: 2019-12-07 | Stop reason: HOSPADM

## 2019-12-06 RX ORDER — GABAPENTIN 300 MG/1
300 CAPSULE ORAL DAILY PRN
COMMUNITY

## 2019-12-06 RX ADMIN — MAGNESIUM OXIDE TAB 400 MG (241.3 MG ELEMENTAL MG) 800 MG: 400 (241.3 MG) TAB at 10:12

## 2019-12-06 RX ADMIN — FLUOXETINE 20 MG: 20 CAPSULE ORAL at 08:12

## 2019-12-06 RX ADMIN — ACETYLCYSTEINE 2 ML: 200 INHALANT RESPIRATORY (INHALATION) at 08:12

## 2019-12-06 RX ADMIN — THERA TABS 1 TABLET: TAB at 08:12

## 2019-12-06 RX ADMIN — MAGNESIUM SULFATE HEPTAHYDRATE 3 G: 500 INJECTION, SOLUTION INTRAMUSCULAR; INTRAVENOUS at 12:12

## 2019-12-06 RX ADMIN — HYDROMORPHONE HYDROCHLORIDE 1 MG: 1 INJECTION, SOLUTION INTRAMUSCULAR; INTRAVENOUS; SUBCUTANEOUS at 09:12

## 2019-12-06 RX ADMIN — FLUTICASONE FUROATE AND VILANTEROL TRIFENATATE 1 PUFF: 100; 25 POWDER RESPIRATORY (INHALATION) at 09:12

## 2019-12-06 RX ADMIN — HYDROMORPHONE HYDROCHLORIDE 1 MG: 1 INJECTION, SOLUTION INTRAMUSCULAR; INTRAVENOUS; SUBCUTANEOUS at 12:12

## 2019-12-06 RX ADMIN — HYDROMORPHONE HYDROCHLORIDE 1 MG: 1 INJECTION, SOLUTION INTRAMUSCULAR; INTRAVENOUS; SUBCUTANEOUS at 07:12

## 2019-12-06 RX ADMIN — OXYCODONE HYDROCHLORIDE 5 MG: 5 TABLET ORAL at 08:12

## 2019-12-06 RX ADMIN — HYDROMORPHONE HYDROCHLORIDE 1 MG: 1 INJECTION, SOLUTION INTRAMUSCULAR; INTRAVENOUS; SUBCUTANEOUS at 04:12

## 2019-12-06 RX ADMIN — PANTOPRAZOLE SODIUM 40 MG: 40 TABLET, DELAYED RELEASE ORAL at 08:12

## 2019-12-06 RX ADMIN — ALPRAZOLAM 0.5 MG: 0.25 TABLET ORAL at 08:12

## 2019-12-06 RX ADMIN — ACETYLCYSTEINE 2 ML: 200 INHALANT RESPIRATORY (INHALATION) at 09:12

## 2019-12-06 RX ADMIN — IPRATROPIUM BROMIDE AND ALBUTEROL SULFATE 3 ML: .5; 3 SOLUTION RESPIRATORY (INHALATION) at 08:12

## 2019-12-06 RX ADMIN — SODIUM BICARBONATE 650 MG TABLET 1300 MG: at 12:12

## 2019-12-06 RX ADMIN — MAGNESIUM OXIDE TAB 400 MG (241.3 MG ELEMENTAL MG) 800 MG: 400 (241.3 MG) TAB at 08:12

## 2019-12-06 RX ADMIN — ONDANSETRON 8 MG: 8 TABLET, ORALLY DISINTEGRATING ORAL at 08:12

## 2019-12-06 RX ADMIN — IPRATROPIUM BROMIDE AND ALBUTEROL SULFATE 3 ML: .5; 3 SOLUTION RESPIRATORY (INHALATION) at 09:12

## 2019-12-06 RX ADMIN — SODIUM BICARBONATE 650 MG TABLET 1300 MG: at 10:12

## 2019-12-06 RX ADMIN — TACROLIMUS 1 MG: 1 CAPSULE ORAL at 10:12

## 2019-12-06 RX ADMIN — MYCOPHENOLATE MOFETIL 750 MG: 250 CAPSULE ORAL at 10:12

## 2019-12-06 RX ADMIN — TACROLIMUS 2 MG: 1 CAPSULE ORAL at 08:12

## 2019-12-06 RX ADMIN — HYDROMORPHONE HYDROCHLORIDE 1 MG: 1 INJECTION, SOLUTION INTRAMUSCULAR; INTRAVENOUS; SUBCUTANEOUS at 02:12

## 2019-12-06 RX ADMIN — MYCOPHENOLATE MOFETIL 750 MG: 250 CAPSULE ORAL at 08:12

## 2019-12-06 RX ADMIN — ZOLPIDEM TARTRATE 5 MG: 5 TABLET ORAL at 11:12

## 2019-12-06 RX ADMIN — AMLODIPINE BESYLATE 5 MG: 5 TABLET ORAL at 08:12

## 2019-12-06 RX ADMIN — ALPRAZOLAM 0.5 MG: 0.25 TABLET ORAL at 10:12

## 2019-12-06 RX ADMIN — PREDNISONE 5 MG: 5 TABLET ORAL at 08:12

## 2019-12-06 NOTE — PLAN OF CARE
Plan of care reviewed with pt and daughter, who verbalized understanding. Pt w/ landers catheter - pink tinged urine, AUO. Tolerating regular diet, one instance of nausea resolved w/ PRN zofran. Pain managed w/ PRN meds. Call bell in reach, bed locked in lowest position. Frequent rounds made for pt safety. AAOx4, VSS, will continue to monitor.

## 2019-12-06 NOTE — PROGRESS NOTES
[facility] Hospital Medicine  Progress Note    Patient Name: Virginia Lentz  MRN: [id number]  Patient Class: IP- Inpatient   Admission Date: 2019  Length of Stay: 1 days  Attending Physician: Shalom Benavidez MD  Primary Care Provider: Melissa Garcias MD    [facility] Medicine Team: [facility] HOSP MED D Shalom Benavidez MD    HPI:  Ms. 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 She denies taking blood thinners, NSAID use, or medication nonadherence. She also denies fever, chills, chest pain, shortness of breath, abdominal pain or lower extremity swelling. Admitted to Hospital medicine service for evaluation and management of urinary retention.         Subjective:     Principal Problem:Acute urinary retention    Interval History: Lying in bed, no acute distress. Reports continued LLQ abdominal pain at site of kidney biopsy. Reports that pain is not adequately controlled. Reports hematuria. No fever, chills, worsening SOB or lower extremity swelling.     Review of Systems   Constitutional: Negative for chills and fever.   Eyes: Negative for visual disturbance.   Respiratory: Positive for cough and shortness of breath.    Cardiovascular: Negative for chest pain and leg swelling.   Gastrointestinal: Positive for abdominal pain, constipation and nausea. Negative for abdominal distention and vomiting.   Genitourinary: Positive for difficulty urinating, dysuria and hematuria.   Musculoskeletal: Positive for myalgias.   Skin: Positive for rash.   Allergic/Immunologic:  Positive for immunocompromised state.   Neurological: Positive for headaches. Negative for dizziness and weakness.     Objective:     Vital Signs (Most Recent):  Temp: 97.5 °F (36.4 °C) (12/06/19 0744)  Pulse: 81 (12/06/19 0744)  Resp: 16 (12/06/19 0744)  BP: 134/76 (12/06/19 0744)  SpO2: (!) 94 % (12/06/19 0744) Vital Signs (24h Range):  Temp:  [97.1 °F (36.2 °C)-98.2 °F (36.8 °C)] 97.5 °F (36.4 °C)  Pulse:  [68-91] 81  Resp:  [16-20] 16  SpO2:  [94 %-100 %] 94 %  BP: (113-144)/(57-76) 134/76     Weight: 52.6 kg (116 lb)  Body mass index is 22.65 kg/m².    Intake/Output Summary (Last 24 hours) at 12/6/2019 0804  Last data filed at 12/6/2019 0500  Gross per 24 hour   Intake 300 ml   Output 1350 ml   Net -1050 ml      Physical Exam   Constitutional: She is oriented to person, place, and time. She appears well-developed and well-nourished.   HENT:   Head: Normocephalic and atraumatic.   Eyes: Pupils are equal, round, and reactive to light. Conjunctivae are normal.   Neck: Neck supple.   Cardiovascular: Normal rate and regular rhythm.   Pulmonary/Chest: Effort normal. She has rales.   Abdominal: Soft. Bowel sounds are normal. There is tenderness.   Musculoskeletal: She exhibits no edema.   Neurological: She is alert and oriented to person, place, and time.   Skin: Skin is warm. Rash (on lower back) noted.   Psychiatric: She has a normal mood and affect.       Significant Labs:   A1C: No results for input(s): HGBA1C in the last 4320 hours.  CBC:   Recent Labs   Lab 12/05/19  1048 12/06/19  0358   WBC 6.30 6.07   HGB 10.5* 8.8*   HCT 34.5* 29.6*   * 356*     CMP:   Recent Labs   Lab 12/05/19  1048 12/06/19  0358    139   K 4.1 3.5    110   CO2 19* 20*    84   BUN 38* 35*   CREATININE 2.1* 1.9*   CALCIUM 9.3 8.2*   PROT  --  6.2   ALBUMIN 3.9 3.2*   BILITOT  --  0.4   ALKPHOS  --  82   AST  --  71*   ALT  --  22   ANIONGAP 10 9   EGFRNONAA 25.0* 28.3*     Coagulation:   Recent Labs   Lab  12/05/19  2133   INR 0.9   APTT 28.2       Significant Imaging: I have reviewed and interpreted all pertinent imaging results/findings within the past 24 hours.    Assessment/Plan:      Active Diagnoses:    Diagnosis Date Noted POA    PRINCIPAL PROBLEM:  Acute urinary retention [R33.8] 12/05/2019 Unknown    Kidney dysfunction [N28.9] 12/05/2019 Yes    Clot hematuria [R31.0] 12/05/2019 Unknown      Problems Resolved During this Admission:     PLAN:    #Urinary retention  #Hematuria/clot  - Renal US during biopsy revealed hematoma in urinary bladder  - landers ordered  - strict I/Os  - urology consulted. apprec recs  -- gross hematuria due to biopsy; no need for further workup; appears to have resolved now  -- recommend discontinuing Landers tomorrow, attempt voiding trial; if patient fails voiding trial, recommend straight catheterizing to obtain PVR and starting CIC  -- encourage ambulation; minimize narcotics; treat any constipation  - holding home aspirin      ARSEN on CKD  - b/l Scr 1.3-1.6  - Scr: 2.1  - continue with IVF  - transplant nephrology consulted. apprec recs  - holding home aspirin   - avoid nephrotoxic agents  - renally dose medications  - strict I/Os  - followup renal biopsy      #s/p kidney-liver transplant  #Immunosuppression  - continue home tacrolimus, prednisone and Cellcept  - consulted transplant nephrology.apprec recs  -- Combined Liver and kidney transplant, complicated with vascular rejection in 1/12/12, treated with Thymoglobulin 150 mg x 7 days  -- Cont prograf 2 mg BID and mycophenolate 750 mg BID, cont prednisone 5 mg daily, will decrease to 1 mg BID due to supra therapeutic levels   -- Prograf levels daily, latest one at 10.3  - followup renal biopsy      #HTN  - continue home amlodipine      #Insomnia  #Depression  #Anxiety   - continue Ambien   - continue Fluoxetine  - continue xanax BID     #h/o recurrent pneumonia   #chronic cough  - Symbicort at home  - continue Breo inhaler   -  mucomyst BID   - duoneb prn     VTE Risk Mitigation (From admission, onward)         Ordered     Place sequential compression device  Until discontinued      12/05/19 1746     IP VTE HIGH RISK PATIENT  Once      12/05/19 1746               Time spent in care of patient: > 35 minutes    Shalom Benavidez MD  Department of Hospital Medicine   [facility]

## 2019-12-06 NOTE — PROGRESS NOTES
#16F landers catheter placed with no issue. 400ml pink urine returned. After linen change dark red urine noted in catheter tubing. Urology paged for possible irrigation.

## 2019-12-06 NOTE — NURSING TRANSFER
Nursing Transfer Note      12/5/2019     Transfer To: [facility] From: [facility]    Transfer via stretcher    Transfer with IV pole     Transported by escort    Medicines sent: IVF    Chart send with patient: Yes    Notified: [facility] RN     Patient reassessed at: 12/5/19 2100 (date, time)    Upon arrival to floor: patient oriented to room, call bell in reach and bed in lowest position   "Otolaryngology/ENT History & Physical  December 5, 2018    CC: \"throat pain\"    HPI: Sheri Fuller is a 19-year-old woman with a past medical history of migraines who presents to the ED with approximately 1 week of worsening throat pain, trismus, dysphagia, odynophagia with pain that radiates to the left ear, myalgias, fevers up to 102.9, and chills. These symptoms are similar to those she has experienced before and notes that she gets 3-4 streptococcal infections per year. Usually they resolve with a course of antibiotics. This is the most severe episode of throat pain she has experienced thus far. She went to an outside clinic yesterday, where rapid Strep and mono testing were reportedly negative. No antibiotics were prescribed. She has tried to control the pain with Tylenol but finds it difficult to swallow the pills. She went to a second clinic today due to worsening symptoms and was instructed to present to the ED due to concern for peritonsillar abscess. In the ED, she appears acutely ill but is stable on room air without any respiratory distress. She is able to swallow her secretions and can tolerate drinking liquids.     Past Medical History:   Diagnosis Date     Migraine with aura     1 per 3 months.     Past Surgical History: None.    Current Outpatient Prescriptions   Medication Sig Dispense Refill     ibuprofen (ADVIL/MOTRIN) 600 MG tablet Take 1 tablet (600 mg) by mouth every 6 hours as needed for moderate pain 30 tablet 0     norethindrone (MICRONOR) 0.35 MG per tablet Take 1 tablet (0.35 mg) by mouth daily 28 tablet 11     spironolactone (ALDACTONE) 25 MG tablet Take 2 tablets (50 mg) by mouth daily 60 tablet 3     Allergies: NKDA.    Family History: Non-contributory.    Social History: Presents today with her partner, Alana. They live in [place].    ROS: 12 point review of systems is negative unless noted in HPI.    PHYSICAL EXAM:  General: Resting comfortably in bed, in mild distress " "and appears ill.  /69  Pulse 123  Temp 102.9  F (39.4  C) (Oral)  Resp 18  Ht 1.702 m (5' 7\")  Wt 64.8 kg (142 lb 14.4 oz)  LMP 11/22/2018 (Exact Date)  SpO2 98%  BMI 22.38 kg/m2  HEAD: Normocephalic, atraumatic.  Face: Symmetric, no swelling, edema, or erythema, no facial droop.  Eyes: EOMI, PERRL, clear sclera.  Nose: Nares patent, no anterior drainage.  Mouth: Moist mucous membranes, no ulcers or oral lesions, no jaw or tooth tenderness, tongue midline and symmetric.  Oropharynx: Left tonsil is 2+ with purulent exudate, left soft palate is erythematous but not significantly swollen when compared to the right, on palpation soft palate and paratonsillar space are soft with no appreciable fluid collection, uvula midline.   Neck: No palpable cervical LAD, trachea midline, mild tenderness to palpation along left submandibular triangle.  Neuro: Cranial nerves 2-12 grossly intact.  Respiratory: Breathing non-labored on RA, no stridor, no stertor, no accessory muscle use.     ROUTINE IP LABS (Last four results)  BMP  Recent Labs  Lab 12/05/18  1113      POTASSIUM 3.5   CHLORIDE 103   MARILYN 9.2   CO2 26   BUN 9   CR 0.76   GLC 96     CBC  Recent Labs  Lab 12/05/18  1113   WBC 11.6*   RBC 4.24   HGB 12.9   HCT 39.4   MCV 93   MCH 30.4   MCHC 32.7   RDW 12.7        Imaging: None.    Assessment and Plan:  Sheri Fuller is a 19-year-old woman with a past medical history of migraines who presents to the ED with approximately 1 week of worsening throat pain, trismus, dysphagia, odynophagia with pain that radiates to the left ear, myalgias, fevers up to 102.9, and chills suggestive of acute tonsillitis. Despite the severity of her symptoms, there is no soft palate asymmetry, uvula deviation, drooling, or fluid collection suggestive of peritonsillar abscess. She could be progressing toward development of one, but at this point there is no clear abscess that could be addressed with incision and " drainage.     -Recommend 1-week course of Augmentin for acute tonsillitis   -Can give dose of Unasyn now prior to discharge   -Continue fluid resuscitation due to recent poor PO intake   -Recommend discharge with liquid Tylenol to treat pain and fevers    -No need for I&D at this time   -Patient and her spouse have been instructed to return to the ED if she does not have improvement with antibiotics or develops respiratory distress    Dispo: To home after fluid resuscitation and dose of IV antibiotics.    Patient was discussed with staff, Dr. Barriga, who agrees with the assessment and plan as described.    Jovanna Lewis MD PGY-1  Otolaryngology-Head & Neck Surgery

## 2019-12-06 NOTE — PHARMACY MED REC
"Admission Medication Reconciliation - Pharmacy Consult Note    The home medication history was taken by Joby Olivia PharmD.  Based on information gathered and subsequent review by the clinical pharmacist, the items below may need attention.     You may go to "Admission" then "Reconcile Home Medications" tabs to review and/or act upon these items.     Potentially problematic discrepancies with current MAR  o Patient IS taking the following which was not ordered upon admit  o Synthroid (levothyroxine) 25 mcg tablet by mouth once daily    Please address this information as you see fit.  Feel free to contact us if you have any questions or require assistance.    Joby Olivia PharmD  EXT [id number]    .    .            "

## 2019-12-06 NOTE — PLAN OF CARE
POC reviewed w/ patient.  Pt verbalized understanding.  AAOx4, vss on room air.  Regular diet w/ no c/o nausea.  NS @ 75ml/hr.  [place] patent and intact draining dark, pink urine.  Adequate output.  Pain controlled w/ prn Dilaudid.  Ambulates with standby assist.  No acute events this shift.  Pt is resting w/ call light in reach.  Will continue to monitor.

## 2019-12-06 NOTE — PROGRESS NOTES
Unable to reach [facility] Medicine Med Team D. Patient complaining of severe abdominal pain. Patient attempted to urinate with no success. Bladder scan showed approximately 450ml. IR on call notified. IR  will try to get a hold of medicine team MD.

## 2019-12-06 NOTE — SUBJECTIVE & OBJECTIVE
Past Medical History:   Diagnosis Date    ARSEN (acute kidney injury) 2019    Alcoholic cirrhosis 7/10/2012    CKD (chronic kidney disease) stage 3, GFR 30-59 ml/min     -donor kidney transplant 7/10/2012    Essential hypertension 10/22/2018    H/O recurrent pneumonia 10/22/2018    Hypocitraturic calcium nephrolithiasis 2013 24 urine showed low urine citrate.    Hypothyroidism 7/10/2012    Liver replaced by transplant 7/10/2012    Need for prophylactic immunotherapy 7/10/2012    Pancreatitis  Aug 2011    Pulmonary embolism 10/22/2018    Recurrent nephrolithiasis 7/10/2012    Recurrent pneumonia     last episode 2018       Past Surgical History:   Procedure Laterality Date    APPENDECTOMY       SECTION      KIDNEY TRANSPLANT      LIVER TRANSPLANT      RENAL BIOPSY N/A 2019    Procedure: BIOPSY, KIDNEY;  Surgeon: Paty Diagnostic Provider;  Location: [facility] OR [address] [place] [address];  Service: Anesthesiology;  Laterality: N/A;  pt instructed to report to 2nd floor @ 10:30; Report to DOSC @12:00 PM, biopsy @ 2:00 PM    TONSILLECTOMY      TRACHEOSTOMY CLOSURE  Mar 2012    TRACHEOSTOMY TUBE PLACEMENT         Review of patient's allergies indicates:   Allergen Reactions    Levaquin [levofloxacin]      Tendon tenderness       Family History     None          Tobacco Use    Smoking status: Never Smoker    Smokeless tobacco: Never Used   Substance and Sexual Activity    Alcohol use: No     Frequency: Never    Drug use: Not on file    Sexual activity: Not on file       Review of Systems   Constitutional: Negative for chills and fever.   HENT: Negative for voice change.    Eyes: Negative for visual disturbance.   Respiratory: Negative for shortness of breath.    Cardiovascular: Negative for chest pain.   Gastrointestinal: Negative for nausea and vomiting.   Genitourinary: Positive for difficulty urinating, hematuria and pelvic pain. Negative for flank pain.   Musculoskeletal:  Negative for myalgias.   Skin: Negative for wound.   Neurological: Negative for weakness.   Hematological: Does not bruise/bleed easily.   Psychiatric/Behavioral: Negative for confusion.       Objective:     Temp:  [97.1 °F (36.2 °C)-98.2 °F (36.8 °C)] 98.1 °F (36.7 °C)  Pulse:  [68-91] 76  Resp:  [10-20] 18  SpO2:  [93 %-100 %] 94 %  BP: (113-147)/(57-79) 147/79     Body mass index is 21.92 kg/m².    Date 12/06/19 0700 - 12/07/19 0659   Shift 8963-5371 2592-4700 0291-7695 24 Hour Total   INTAKE   P.O. 720 240  960   Shift Total(mL/kg) 720(13.7) 240(4.6)  960(18.2)   OUTPUT   Urine(mL/kg/hr) 900(2.1)   900   Shift Total(mL/kg) 900(17.1)   900(17.1)   Weight (kg) 52.6 52.6 52.6 52.6          Drains     Drain                 Urethral Catheter 12/05/19 1830 Straight-tip 16 Fr. less than 1 day                Physical Exam   Constitutional: She is oriented to person, place, and time. She appears well-developed and well-nourished.   HENT:   Head: Normocephalic and atraumatic.   Eyes: Conjunctivae are normal.   Neck: Normal range of motion. Neck supple.   Cardiovascular: Normal rate.    Pulmonary/Chest: Effort normal.   Abdominal: Soft. She exhibits no distension. There is no tenderness.   Genitourinary:   Genitourinary Comments: Hollingsworth draining clear yellow urine   Musculoskeletal: Normal range of motion.   Neurological: She is alert and oriented to person, place, and time.   Skin: Skin is warm and dry.     Psychiatric: She has a normal mood and affect. Her behavior is normal. Judgment and thought content normal.       Significant Labs:    BMP:  Recent Labs   Lab 12/05/19  1048 12/06/19  0358    139   K 4.1 3.5    110   CO2 19* 20*   BUN 38* 35*   CREATININE 2.1* 1.9*   CALCIUM 9.3 8.2*       CBC:  Recent Labs   Lab 12/05/19  1048 12/06/19  0358   WBC 6.30 6.07   HGB 10.5* 8.8*   HCT 34.5* 29.6*   * 356*       Urine Studies:   Recent Labs   Lab 12/06/19  0730   COLORU Straw   APPEARANCEUA Clear   PHUR  6.0   SPECGRAV 1.005   PROTEINUA Negative   GLUCUA Negative   KETONESU Negative   BILIRUBINUA Negative   OCCULTUA 3+*   NITRITE Negative   LEUKOCYTESUR 2+*   RBCUA 95*   WBCUA 12*   BACTERIA Occasional       Significant Imaging:  All pertinent imaging results/findings from the past 24 hours have been reviewed.   US - small bladder hematoma; hematoma around transplant kidney; no hydronephrosis of transplant kidney, no masses or stones

## 2019-12-06 NOTE — CONSULTS
PharmD Consult received for:  1.) Admit medication history/reconciliation: Please see pharmacy medication reconciliation consult note.     2.) Renally adjust all medications:  Estimated Creatinine Clearance: 23.8 mL/min (A) (based on SCr of 1.9 mg/dL (H)).     Medications reviewed, no dose adjustments needed    Thank you for the consult,  Joby Olivia PharmD  Ext [phone]    **Note: Consults are reviewed Monday-Friday 7:00am-3:30pm. The above recommendations are only suggested. The recommendations should be considered in conjunction with all patient factors.**

## 2019-12-06 NOTE — ASSESSMENT & PLAN NOTE
60 yo F with ARSEN on CKD after renal transplantation who developed gross hematuria with clot retention after renal biopsy.     - gross hematuria due to biopsy; no need for further workup; appears to have resolved now  - recommend discontinuing Hollingsworth tomorrow, attempt voiding trial; if patient fails voiding trial, recommend straight catheterizing to obtain PVR and starting CIC  - encourage ambulation; minimize narcotics; treat any constipation

## 2019-12-06 NOTE — NURSING
Daughter at bedside, requesting POC review w/ attending. RN spoke to Dr. Benavidez who said he would call the daughter at the number listed on the chart.

## 2019-12-06 NOTE — PLAN OF CARE
12/06/19 1035   Discharge Assessment   Assessment Type Discharge Planning Assessment   Confirmed/corrected address and phone number on facesheet? Yes   Assessment information obtained from? Patient   Expected Length of Stay (days) 3   Communicated expected length of stay with patient/caregiver yes   Prior to hospitilization cognitive status: Alert/Oriented   Prior to hospitalization functional status: Independent   Current cognitive status: Alert/Oriented   Current Functional Status: Independent   Lives With spouse  (spouse, Shorty Lentz ([phone]))   Able to Return to Prior Arrangements yes   Is patient able to care for self after discharge? Yes   Patient's perception of discharge disposition home or selfcare   Readmission Within the Last 30 Days no previous admission in last 30 days   Patient currently being followed by outpatient case management? No   Patient currently receives any other outside agency services? No   Equipment Currently Used at Home none   Do you have any problems affording any of your prescribed medications? No   Is the patient taking medications as prescribed? yes   Does the patient have transportation home? Yes   Transportation Anticipated family or friend will provide  (daughter, Dr. Hazel Lundberg ([phone]))   Does the patient receive services at the [facility]? No   Discharge Plan A Home with family   Discharge Plan B Home Health   DME Needed Upon Discharge  none   Patient/Family in Agreement with Plan yes        12/06/19 1035   Discharge Assessment   Assessment Type Discharge Planning Assessment   Confirmed/corrected address and phone number on facesheet? Yes   Assessment information obtained from? Patient   Expected Length of Stay (days) 3   Communicated expected length of stay with patient/caregiver yes   Prior to hospitilization cognitive status: Alert/Oriented   Prior to hospitalization functional status: Independent   Current cognitive status: Alert/Oriented   Current  Functional Status: Independent   Lives With spouse  (spouse, Shorty Lentz ([phone]))   Able to Return to Prior Arrangements yes   Is patient able to care for self after discharge? Yes   Patient's perception of discharge disposition home or selfcare   Readmission Within the Last 30 Days no previous admission in last 30 days   Patient currently being followed by outpatient case management? No   Patient currently receives any other outside agency services? No   Equipment Currently Used at Home none   Do you have any problems affording any of your prescribed medications? No   Is the patient taking medications as prescribed? yes   Does the patient have transportation home? Yes   Transportation Anticipated family or friend will provide  (daughter, Dr. Hazel Lundberg ([phone]))   Does the patient receive services at the [facility]? No   Discharge Plan A Home with family   Discharge Plan B Home Health   DME Needed Upon Discharge  none   Patient/Family in Agreement with Plan yes     Dx: acute urinary retention  Consult: JEN & urol  Pharm: [facility]  Hosp f/u appt: tbd    Patient stated that she will be staying with her daughter, Dr[address] Hazel Lundberg [address], [place], [place], [address]), for a few days following discharge.

## 2019-12-06 NOTE — CONSULTS
[facility]  Urology  Consult Note    Patient Name: Virginia Lentz  MRN: [id number]  Admission Date: 2019  Hospital Length of Stay: 1   Code Status: Full Code   Attending Provider: Shalom Benavidez MD   Consulting Provider: Vivek Dean MD  Primary Care Physician: Leti Alexander  Principal Problem:Acute urinary retention    Inpatient consult to Urology  Consult performed by: Vivek Dean MD  Consult ordered by: Shalom Benavidez MD          Subjective:     HPI:  Ms. 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 She developed ARSEN in 2019, with Cr increasing to 3.5 from baseline of 1.4. On  she underwent biopsy of the transplanted kidney and developed gross hematuria, with inability to void despite urgency and straining to void. There was associated suprapubic pain. US shows hematoma in bladder (not seen on pre-biopsy US) and hematoma near transplanted kidney. Bladder scan revealed 450 cc; a Hollingsworth was placed with return of 450 cc bloody urine. Her catheter was irrigated by nursing. Urology was consulted. The patient reports no prior gross hematuria and no urinary symptoms prior to this episode. Gross hematuria stopped this morning (.) Cr is stable at 1.9. UA shows 95 RBC, 12 WBC, occasional bacteria.     Past Medical History:   Diagnosis Date    ARSEN (acute kidney injury) 2019    Alcoholic cirrhosis 7/10/2012    CKD (chronic kidney disease) stage 3, GFR 30-59 ml/min     -donor kidney transplant 7/10/2012    Essential hypertension 10/22/2018    H/O recurrent pneumonia 10/22/2018    Hypocitraturic calcium nephrolithiasis 2013 24 urine showed low urine citrate.    Hypothyroidism 7/10/2012    Liver replaced by transplant  7/10/2012    Need for prophylactic immunotherapy 7/10/2012    Pancreatitis  Aug 2011    Pulmonary embolism 10/22/2018    Recurrent nephrolithiasis 7/10/2012    Recurrent pneumonia     last episode 2018       Past Surgical History:   Procedure Laterality Date    APPENDECTOMY       SECTION      KIDNEY TRANSPLANT      LIVER TRANSPLANT      RENAL BIOPSY N/A 2019    Procedure: BIOPSY, KIDNEY;  Surgeon: Paty Diagnostic Provider;  Location: [facility] OR [address] [place] [address];  Service: Anesthesiology;  Laterality: N/A;  pt instructed to report to 2nd floor @ 10:30; Report to DOSC @12:00 PM, biopsy @ 2:00 PM    TONSILLECTOMY      TRACHEOSTOMY CLOSURE  Mar 2012    TRACHEOSTOMY TUBE PLACEMENT         Review of patient's allergies indicates:   Allergen Reactions    Levaquin [levofloxacin]      Tendon tenderness       Family History     None          Tobacco Use    Smoking status: Never Smoker    Smokeless tobacco: Never Used   Substance and Sexual Activity    Alcohol use: No     Frequency: Never    Drug use: Not on file    Sexual activity: Not on file       Review of Systems   Constitutional: Negative for chills and fever.   HENT: Negative for voice change.    Eyes: Negative for visual disturbance.   Respiratory: Negative for shortness of breath.    Cardiovascular: Negative for chest pain.   Gastrointestinal: Negative for nausea and vomiting.   Genitourinary: Positive for difficulty urinating, hematuria and pelvic pain. Negative for flank pain.   Musculoskeletal: Negative for myalgias.   Skin: Negative for wound.   Neurological: Negative for weakness.   Hematological: Does not bruise/bleed easily.   Psychiatric/Behavioral: Negative for confusion.       Objective:     Temp:  [97.1 °F (36.2 °C)-98.2 °F (36.8 °C)] 98.1 °F (36.7 °C)  Pulse:  [68-91] 76  Resp:  [10-20] 18  SpO2:  [93 %-100 %] 94 %  BP: (113-147)/(57-79) 147/79     Body mass index is 21.92 kg/m².    Date 19 0700 - 19 0659   Shift 4342-8395  1264-9357 2399-0786 24 Hour Total   INTAKE   P.O. 720 240  960   Shift Total(mL/kg) 720(13.7) 240(4.6)  960(18.2)   OUTPUT   Urine(mL/kg/hr) 900(2.1)   900   Shift Total(mL/kg) 900(17.1)   900(17.1)   Weight (kg) 52.6 52.6 52.6 52.6          Drains     Drain                 Urethral Catheter 12/05/19 1830 Straight-tip 16 Fr. less than 1 day                Physical Exam   Constitutional: She is oriented to person, place, and time. She appears well-developed and well-nourished.   HENT:   Head: Normocephalic and atraumatic.   Eyes: Conjunctivae are normal.   Neck: Normal range of motion. Neck supple.   Cardiovascular: Normal rate.    Pulmonary/Chest: Effort normal.   Abdominal: Soft. She exhibits no distension. There is no tenderness.   Genitourinary:   Genitourinary Comments: Hollingsworth draining clear yellow urine   Musculoskeletal: Normal range of motion.   Neurological: She is alert and oriented to person, place, and time.   Skin: Skin is warm and dry.     Psychiatric: She has a normal mood and affect. Her behavior is normal. Judgment and thought content normal.       Significant Labs:    BMP:  Recent Labs   Lab 12/05/19  1048 12/06/19  0358    139   K 4.1 3.5    110   CO2 19* 20*   BUN 38* 35*   CREATININE 2.1* 1.9*   CALCIUM 9.3 8.2*       CBC:  Recent Labs   Lab 12/05/19  1048 12/06/19  0358   WBC 6.30 6.07   HGB 10.5* 8.8*   HCT 34.5* 29.6*   * 356*       Urine Studies:   Recent Labs   Lab 12/06/19  0730   COLORU Straw   APPEARANCEUA Clear   PHUR 6.0   SPECGRAV 1.005   PROTEINUA Negative   GLUCUA Negative   KETONESU Negative   BILIRUBINUA Negative   OCCULTUA 3+*   NITRITE Negative   LEUKOCYTESUR 2+*   RBCUA 95*   WBCUA 12*   BACTERIA Occasional       Significant Imaging:  All pertinent imaging results/findings from the past 24 hours have been reviewed.   US - small bladder hematoma; hematoma around transplant kidney; no hydronephrosis of transplant kidney, no masses or  stones                    Assessment and Plan:     * Acute urinary retention  58 yo F with ARSEN on CKD after renal transplantation who developed gross hematuria with clot retention after renal biopsy.     - gross hematuria due to biopsy; no need for further workup; appears to have resolved now  - recommend discontinuing Hollingsworth tomorrow, attempt voiding trial; if patient fails voiding trial, recommend straight catheterizing to obtain PVR and starting CIC  - encourage ambulation; minimize narcotics; treat any constipation        VTE Risk Mitigation (From admission, onward)         Ordered     Place sequential compression device  Until discontinued      12/05/19 1746     IP VTE HIGH RISK PATIENT  Once      12/05/19 1746                Thank you for your consult. I will sign off. Please contact us if you have any additional questions.    Vivek Dean MD  Urology  [facility]

## 2019-12-07 VITALS
WEIGHT: 116 LBS | HEART RATE: 82 BPM | BODY MASS INDEX: 21.9 KG/M2 | DIASTOLIC BLOOD PRESSURE: 71 MMHG | SYSTOLIC BLOOD PRESSURE: 136 MMHG | OXYGEN SATURATION: 94 % | TEMPERATURE: 98 F | HEIGHT: 61 IN | RESPIRATION RATE: 18 BRPM

## 2019-12-07 PROBLEM — R31.0 CLOT HEMATURIA: Status: RESOLVED | Noted: 2019-12-05 | Resolved: 2019-12-07

## 2019-12-07 PROBLEM — R33.8 ACUTE URINARY RETENTION: Status: RESOLVED | Noted: 2019-12-05 | Resolved: 2019-12-07

## 2019-12-07 LAB
ALBUMIN SERPL BCP-MCNC: 3.1 G/DL (ref 3.5–5.2)
ALBUMIN SERPL BCP-MCNC: 3.1 G/DL (ref 3.5–5.2)
ALP SERPL-CCNC: 110 U/L (ref 55–135)
ALP SERPL-CCNC: 113 U/L (ref 55–135)
ALT SERPL W/O P-5'-P-CCNC: 33 U/L (ref 10–44)
ALT SERPL W/O P-5'-P-CCNC: 34 U/L (ref 10–44)
ANION GAP SERPL CALC-SCNC: 10 MMOL/L (ref 8–16)
ANION GAP SERPL CALC-SCNC: 8 MMOL/L (ref 8–16)
AST SERPL-CCNC: 36 U/L (ref 10–40)
AST SERPL-CCNC: 38 U/L (ref 10–40)
BASOPHILS # BLD AUTO: 0.03 K/UL (ref 0–0.2)
BASOPHILS NFR BLD: 0.4 % (ref 0–1.9)
BILIRUB SERPL-MCNC: 0.3 MG/DL (ref 0.1–1)
BILIRUB SERPL-MCNC: 0.3 MG/DL (ref 0.1–1)
BUN SERPL-MCNC: 26 MG/DL (ref 6–20)
BUN SERPL-MCNC: 26 MG/DL (ref 6–20)
CALCIUM SERPL-MCNC: 7.8 MG/DL (ref 8.7–10.5)
CALCIUM SERPL-MCNC: 8.3 MG/DL (ref 8.7–10.5)
CHLORIDE SERPL-SCNC: 107 MMOL/L (ref 95–110)
CHLORIDE SERPL-SCNC: 108 MMOL/L (ref 95–110)
CO2 SERPL-SCNC: 22 MMOL/L (ref 23–29)
CO2 SERPL-SCNC: 23 MMOL/L (ref 23–29)
CREAT SERPL-MCNC: 1.7 MG/DL (ref 0.5–1.4)
CREAT SERPL-MCNC: 1.7 MG/DL (ref 0.5–1.4)
DIFFERENTIAL METHOD: ABNORMAL
EOSINOPHIL # BLD AUTO: 0 K/UL (ref 0–0.5)
EOSINOPHIL NFR BLD: 0.3 % (ref 0–8)
ERYTHROCYTE [DISTWIDTH] IN BLOOD BY AUTOMATED COUNT: 16.1 % (ref 11.5–14.5)
EST. GFR  (AFRICAN AMERICAN): 37.3 ML/MIN/1.73 M^2
EST. GFR  (AFRICAN AMERICAN): 37.3 ML/MIN/1.73 M^2
EST. GFR  (NON AFRICAN AMERICAN): 32.3 ML/MIN/1.73 M^2
EST. GFR  (NON AFRICAN AMERICAN): 32.3 ML/MIN/1.73 M^2
GLUCOSE SERPL-MCNC: 85 MG/DL (ref 70–110)
GLUCOSE SERPL-MCNC: 87 MG/DL (ref 70–110)
HCT VFR BLD AUTO: 27.2 % (ref 37–48.5)
HGB BLD-MCNC: 8.1 G/DL (ref 12–16)
IMM GRANULOCYTES # BLD AUTO: 0.06 K/UL (ref 0–0.04)
IMM GRANULOCYTES NFR BLD AUTO: 0.9 % (ref 0–0.5)
LYMPHOCYTES # BLD AUTO: 0.6 K/UL (ref 1–4.8)
LYMPHOCYTES NFR BLD: 9.4 % (ref 18–48)
MAGNESIUM SERPL-MCNC: 2.3 MG/DL (ref 1.6–2.6)
MCH RBC QN AUTO: 27.5 PG (ref 27–31)
MCHC RBC AUTO-ENTMCNC: 29.8 G/DL (ref 32–36)
MCV RBC AUTO: 92 FL (ref 82–98)
MONOCYTES # BLD AUTO: 0.6 K/UL (ref 0.3–1)
MONOCYTES NFR BLD: 8.4 % (ref 4–15)
NEUTROPHILS # BLD AUTO: 5.5 K/UL (ref 1.8–7.7)
NEUTROPHILS NFR BLD: 80.6 % (ref 38–73)
NRBC BLD-RTO: 0 /100 WBC
PHOSPHATE SERPL-MCNC: 3.3 MG/DL (ref 2.7–4.5)
PLATELET # BLD AUTO: 349 K/UL (ref 150–350)
PMV BLD AUTO: 9.3 FL (ref 9.2–12.9)
POTASSIUM SERPL-SCNC: 3.8 MMOL/L (ref 3.5–5.1)
POTASSIUM SERPL-SCNC: 3.9 MMOL/L (ref 3.5–5.1)
PROT SERPL-MCNC: 6.1 G/DL (ref 6–8.4)
PROT SERPL-MCNC: 6.2 G/DL (ref 6–8.4)
RBC # BLD AUTO: 2.95 M/UL (ref 4–5.4)
SODIUM SERPL-SCNC: 138 MMOL/L (ref 136–145)
SODIUM SERPL-SCNC: 140 MMOL/L (ref 136–145)
TACROLIMUS BLD-MCNC: 5.4 NG/ML (ref 5–15)
WBC # BLD AUTO: 6.8 K/UL (ref 3.9–12.7)

## 2019-12-07 PROCEDURE — 80053 COMPREHEN METABOLIC PANEL: CPT | Mod: 91

## 2019-12-07 PROCEDURE — 99239 PR HOSPITAL DISCHARGE DAY,>30 MIN: ICD-10-PCS | Mod: ,,, | Performed by: INTERNAL MEDICINE

## 2019-12-07 PROCEDURE — 94761 N-INVAS EAR/PLS OXIMETRY MLT: CPT

## 2019-12-07 PROCEDURE — 63600175 PHARM REV CODE 636 W HCPCS: Performed by: INTERNAL MEDICINE

## 2019-12-07 PROCEDURE — 83735 ASSAY OF MAGNESIUM: CPT

## 2019-12-07 PROCEDURE — 25000242 PHARM REV CODE 250 ALT 637 W/ HCPCS: Performed by: INTERNAL MEDICINE

## 2019-12-07 PROCEDURE — 80053 COMPREHEN METABOLIC PANEL: CPT

## 2019-12-07 PROCEDURE — 85025 COMPLETE CBC W/AUTO DIFF WBC: CPT

## 2019-12-07 PROCEDURE — 51798 US URINE CAPACITY MEASURE: CPT

## 2019-12-07 PROCEDURE — 84100 ASSAY OF PHOSPHORUS: CPT

## 2019-12-07 PROCEDURE — 25000003 PHARM REV CODE 250: Performed by: INTERNAL MEDICINE

## 2019-12-07 PROCEDURE — 99239 HOSP IP/OBS DSCHRG MGMT >30: CPT | Mod: ,,, | Performed by: INTERNAL MEDICINE

## 2019-12-07 PROCEDURE — 80197 ASSAY OF TACROLIMUS: CPT

## 2019-12-07 PROCEDURE — 36415 COLL VENOUS BLD VENIPUNCTURE: CPT

## 2019-12-07 PROCEDURE — 94640 AIRWAY INHALATION TREATMENT: CPT

## 2019-12-07 RX ORDER — HYDROCODONE BITARTRATE AND ACETAMINOPHEN 10; 325 MG/1; MG/1
1 TABLET ORAL EVERY 4 HOURS PRN
Status: DISCONTINUED | OUTPATIENT
Start: 2019-12-07 | End: 2019-12-07 | Stop reason: HOSPADM

## 2019-12-07 RX ORDER — HYDROMORPHONE HYDROCHLORIDE 1 MG/ML
1 INJECTION, SOLUTION INTRAMUSCULAR; INTRAVENOUS; SUBCUTANEOUS
Status: DISCONTINUED | OUTPATIENT
Start: 2019-12-07 | End: 2019-12-07 | Stop reason: HOSPADM

## 2019-12-07 RX ORDER — AMOXICILLIN 250 MG
1 CAPSULE ORAL 2 TIMES DAILY
Status: DISCONTINUED | OUTPATIENT
Start: 2019-12-07 | End: 2019-12-07 | Stop reason: HOSPADM

## 2019-12-07 RX ORDER — PROMETHAZINE HYDROCHLORIDE 12.5 MG/1
12.5 TABLET ORAL EVERY 6 HOURS PRN
Qty: 60 TABLET | Refills: 1 | Status: SHIPPED | OUTPATIENT
Start: 2019-12-07 | End: 2019-12-22

## 2019-12-07 RX ORDER — HYDROCODONE BITARTRATE AND ACETAMINOPHEN 10; 325 MG/1; MG/1
1 TABLET ORAL EVERY 6 HOURS PRN
Status: DISCONTINUED | OUTPATIENT
Start: 2019-12-07 | End: 2019-12-07

## 2019-12-07 RX ORDER — HYDROCODONE BITARTRATE AND ACETAMINOPHEN 10; 325 MG/1; MG/1
1 TABLET ORAL EVERY 4 HOURS PRN
Qty: 30 TABLET | Refills: 0 | Status: SHIPPED | OUTPATIENT
Start: 2019-12-07 | End: 2019-12-12

## 2019-12-07 RX ORDER — HYDROCODONE BITARTRATE AND ACETAMINOPHEN 10; 325 MG/1; MG/1
1 TABLET ORAL EVERY 4 HOURS PRN
Qty: 30 TABLET | Refills: 0 | Status: SHIPPED | OUTPATIENT
Start: 2019-12-07 | End: 2019-12-07

## 2019-12-07 RX ADMIN — SENNOSIDES AND DOCUSATE SODIUM 1 TABLET: 8.6; 5 TABLET ORAL at 09:12

## 2019-12-07 RX ADMIN — AMLODIPINE BESYLATE 5 MG: 5 TABLET ORAL at 09:12

## 2019-12-07 RX ADMIN — HYDROMORPHONE HYDROCHLORIDE 1 MG: 1 INJECTION, SOLUTION INTRAMUSCULAR; INTRAVENOUS; SUBCUTANEOUS at 09:12

## 2019-12-07 RX ADMIN — MAGNESIUM OXIDE TAB 400 MG (241.3 MG ELEMENTAL MG) 800 MG: 400 (241.3 MG) TAB at 09:12

## 2019-12-07 RX ADMIN — IPRATROPIUM BROMIDE AND ALBUTEROL SULFATE 3 ML: .5; 3 SOLUTION RESPIRATORY (INHALATION) at 07:12

## 2019-12-07 RX ADMIN — THERA TABS 1 TABLET: TAB at 09:12

## 2019-12-07 RX ADMIN — PANTOPRAZOLE SODIUM 40 MG: 40 TABLET, DELAYED RELEASE ORAL at 09:12

## 2019-12-07 RX ADMIN — ALPRAZOLAM 0.5 MG: 0.25 TABLET ORAL at 09:12

## 2019-12-07 RX ADMIN — FLUOXETINE 20 MG: 20 CAPSULE ORAL at 09:12

## 2019-12-07 RX ADMIN — SODIUM BICARBONATE 650 MG TABLET 1300 MG: at 09:12

## 2019-12-07 RX ADMIN — LEVOTHYROXINE SODIUM 25 MCG: 25 TABLET ORAL at 06:12

## 2019-12-07 RX ADMIN — HYDROMORPHONE HYDROCHLORIDE 1 MG: 1 INJECTION, SOLUTION INTRAMUSCULAR; INTRAVENOUS; SUBCUTANEOUS at 01:12

## 2019-12-07 RX ADMIN — PREDNISONE 5 MG: 5 TABLET ORAL at 09:12

## 2019-12-07 RX ADMIN — MYCOPHENOLATE MOFETIL 750 MG: 250 CAPSULE ORAL at 09:12

## 2019-12-07 RX ADMIN — HYDROMORPHONE HYDROCHLORIDE 1 MG: 1 INJECTION, SOLUTION INTRAMUSCULAR; INTRAVENOUS; SUBCUTANEOUS at 02:12

## 2019-12-07 RX ADMIN — TACROLIMUS 1 MG: 1 CAPSULE ORAL at 09:12

## 2019-12-07 RX ADMIN — HYDROCODONE BITARTRATE AND ACETAMINOPHEN 1 TABLET: 10; 325 TABLET ORAL at 11:12

## 2019-12-07 RX ADMIN — FLUTICASONE FUROATE AND VILANTEROL TRIFENATATE 1 PUFF: 100; 25 POWDER RESPIRATORY (INHALATION) at 09:12

## 2019-12-07 RX ADMIN — HYDROMORPHONE HYDROCHLORIDE 1 MG: 1 INJECTION, SOLUTION INTRAMUSCULAR; INTRAVENOUS; SUBCUTANEOUS at 06:12

## 2019-12-07 RX ADMIN — ACETYLCYSTEINE 2 ML: 200 INHALANT RESPIRATORY (INHALATION) at 07:12

## 2019-12-07 NOTE — HPI
60 yo F with a  59 y.o. year old White female who received a  - brain death kidney and liver transplant on 11 for alcoholic cirrhosis. Baseline Cr 1.3-1.6. She had a Biopsy-proven cellular and vascular rejection 12, for which she was treated with Thymoglobulin 150 mg x7 doses.  she has a long-standing history of kidney stones, she required surgery in right kidney (stone extraction). She also had lithotrypsy x 2 in early 20s.   A Txp US  showed two small hyperechoic structures that may represent nonobstructing stones.  Cr increasing to 3.5 from baseline of 1.4. On  she underwent biopsy of the transplanted kidney and developed gross hematuria, with inability to void despite urgency and straining to void. There was associated suprapubic pain. US shows hematoma in bladder (not seen on pre-biopsy US) and hematoma near transplanted kidney. Bladder scan revealed 450 cc; a Hollingsworth was placed with return of 450 cc bloody urine. Her catheter was irrigated by nursing. Urology was consulted. The patient reports no prior gross hematuria and no urinary symptoms prior to this episode. Gross hematuria stopped this morning (.) Cr is stable at 1.9. UA shows 95 RBC, 12 WBC, occasional bacteria. Hollingsworth in place.     KTM consulted for immunosuppression management.

## 2019-12-07 NOTE — DISCHARGE SUMMARY
[facility] Hospital Medicine  Discharge Summary      Patient Name: Virginia Lentz  MRN: [id number]  Admission Date: 2019  Hospital Length of Stay: 2 days  Discharge Date and Time:  2019 3:06 PM  Attending Physician: No att. providers found   Discharging Provider: Shalom Benavidez MD  Primary Care Provider: [facility] Medicine Team: [facility] D Shalom Benavidez MD    HPI:   Ms. 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 She denies taking blood thinners, NSAID use, or medication nonadherence. She also denies fever, chills, chest pain, shortness of breath, abdominal pain or lower extremity swelling. Admitted to Hospital medicine service for evaluation and management of urinary retention.      Procedure(s) (LRB):  BIOPSY, KIDNEY (N/A)      Hospital Course:     Urinary retention  Hematuria/clot  - Renal US during biopsy revealed hematoma in urinary bladder  - strict I/Os  - urology consulted. apprec recs  -- gross hematuria due to biopsy; no need for further workup; appears to have resolved now  -- discontinue landers on  and voided well  - Repeat Renal US after landers removed showed resolution of hematoma   - holding home aspirin. Resume upon discharge     UTI  - Ucx grew E coli. Sensitive to levaquin, ceftriaxone, macrobid, bactrim, gentamicin  - given prior complications with fluoroquinolones and renal insufficiency, prescribed macrobid x5 days   - advised patient to discuss with transplant nephrology (Dr. Rasheed) before starting antibiotic  course     ARSEN on CKD- improving   - b/l Scr 1.3-1.6  - Scr: 2.1  - continue with IVF  - transplant nephrology consulted. apprec recs  - holding home aspirin   - avoid nephrotoxic agents  - renally dose medications  - strict I/Os     #s/p kidney-liver transplant  #Immunosuppression  - home immunosuppression regimen prior to admission: prograf 2 mg BID and mycophenolate 750 mg BID, cont prednisone 5 mg daily  - consulted transplant nephrology.apprec recs  -- Combined Liver and kidney transplant, complicated with vascular rejection in 12, treated with Thymoglobulin 150 mg x 7 days  -- decreased prograf to 1 mg BID due to supra therapeutic levels   -- continue home mycophenolate and prednisone   -- Prograf levels daily  - followup renal biopsy   - outpatient kidney transplant and liver transplant     Elevated transaminases- resolved   - AST increased to 71. downtrended to 36  - ALT wnl at 33     #HTN  - continue home amlodipine      #Insomnia  #Depression  #Anxiety   - continue Ambien   - continue Fluoxetine  - continue xanax BID     #h/o recurrent pneumonia   #chronic cough  - Symbicort at home  - continue Breo inhaler   - mucomyst BID   - duoneb prn     Consults:   Consults (From admission, onward)        Status Ordering Provider     [facility] Medicine PharmD Consult  Once     Provider:  (Not yet assigned)    Completed THI NEUMANN     Inpatient consult to Kidney/Pancreas Transplant Surgery  Once     Provider:  (Not yet assigned)    THI Mclean     Inpatient consult to Urology  Once     Provider:  (Not yet assigned)    THI Mclean          Final Active Diagnoses:    Diagnosis Date Noted POA    Kidney dysfunction [N28.9] 2019 Yes    ARSEN (acute kidney injury) [N17.9] 2019 Yes    -donor kidney transplant [Z94.0] 2011 Not Applicable     Chronic      Problems Resolved During this Admission:    Diagnosis Date Noted Date Resolved POA    PRINCIPAL  PROBLEM:  Acute urinary retention [R33.8] 12/05/2019 12/07/2019 Yes    Clot hematuria [R31.0] 12/05/2019 12/07/2019 Unknown      Discharged Condition: good    Disposition: Home or Self Care    Follow Up:  Follow-up Information     Isa Rasheed MD In 1 week.    Specialties:  Transplant, Nephrology  Contact information:  [address]  [facility] [address]  [phone]             Leti Alexander In 1 week.    Contact information:  [address] [facility] [address] [phone]             Ruben Ferrell MD In 1 week.    Specialties:  Transplant, Hepatology, Gastroenterology  Contact information:  [address]  [facility] [address]  [phone]                 Patient Instructions:   No discharge procedures on file.  Medications:  Reconciled Home Medications:     Patient taking tacrolimus 1mg QAM and 1mg QPM     Medication List      START taking these medications    HYDROcodone-acetaminophen  mg per tablet  Commonly known as:  NORCO  Take 1 tablet by mouth every 4 (four) hours as needed.        CONTINUE taking these medications    acetylcysteine 200 mg/ml (20%) 200 mg/mL (20 %) nebulizer solution  Commonly known as:  MUCOMYST  2 (two) times daily.     * albuterol-ipratropium 2.5 mg-0.5 mg/3 mL nebulizer solution  Commonly known as:  DUO-NEB  3 (three) times daily as needed.     * Combivent Respimat  mcg/actuation inhaler  Generic drug:  ipratropium-albuterol  3 (three) times daily as needed.     Ambien 5 MG Tab  Generic drug:  zolpidem  Take 5 mg by mouth every evening. 1 Tablet Oral At bedtime     amLODIPine 5 MG tablet  Commonly known as:  NORVASC  Take 1 tablet (5 mg total) by mouth once daily.     aspirin 81 MG EC tablet  Commonly known as:  ECOTRIN  Take 81 mg by mouth Daily. 1 Tablet, Delayed Release (E.C.) Oral Every day.     biotin 500 mcg Cap  Take 1 mg by mouth Daily. 1  Oral Every day     ergocalciferol 50,000 unit Cap  Commonly known as:   ERGOCALCIFEROL  Take 50,000 Units by mouth as needed (every 7 days as needed as directed by MD). Currently being held - Patient instructed by primary care physician to take when vitamin D deficiency based on lab results     FLUoxetine 20 MG capsule  Take 1 capsule by mouth once daily.     gabapentin 300 MG capsule  Commonly known as:  NEURONTIN  Take 300 mg by mouth daily as needed. Patient currently not taking with last admin ~4 weeks ago. Would like to keep on board in case of severe nerve pain.     ibandronate 150 mg tablet  Commonly known as:  BONIVA  Take 150 mg by mouth every 30 days. Usually takes each month around the 15th     levothyroxine 25 MCG tablet  Commonly known as:  SYNTHROID  Take 25 mcg by mouth once daily.     loperamide 2 mg capsule  Commonly known as:  IMODIUM  Take 2 mg by mouth daily as needed. 1 Capsule Oral     magnesium oxide 400 mg (241.3 mg magnesium) tablet  Commonly known as:  MAG-OX  Take 3 tablets by mouth 2 (two) times daily.     multivitamin per tablet  Commonly known as:  THERAGRAN  Take by mouth Daily. 1 Tablet Oral Every day.  Over-the-counter Multivitamin     mycophenolate 250 mg Cap  Commonly known as:  CELLCEPT  TAKE 3 CAPSULES TWICE DAILY     NexIUM 40 MG capsule  Generic drug:  esomeprazole  Take 40 mg by mouth 2 (two) times daily.     predniSONE 10 MG tablet  Commonly known as:  DELTASONE  Take 5 mg by mouth Daily. Half Tablet Oral as directed     promethazine 12.5 MG Tab  Commonly known as:  PHENERGAN  Take 1 tablet (12.5 mg total) by mouth every 6 (six) hours as needed.     Xanax 0.25 MG tablet  Generic drug:  ALPRAZolam  Take 0.5 mg by mouth Twice daily.     Zofran ODT 8 MG Tbdl  Generic drug:  ondansetron  Take 8 mg by mouth Use as needed. 1 Tablet, Rapid Dissolve Oral Every 12 hours         * This list has 2 medication(s) that are the same as other medications prescribed for you. Read the directions carefully, and ask your doctor or other care provider to review them  with you.            STOP taking these medications    tacrolimus 1 MG Cap  Commonly known as:  PROGRAF            Significant Diagnostic Studies: Labs:   CMP   Recent Labs   Lab 12/07/19  0415     140   K 3.8  3.9     108   CO2 23  22*   GLU 85  87   BUN 26*  26*   CREATININE 1.7*  1.7*   CALCIUM 7.8*  8.3*   PROT 6.2  6.1   ALBUMIN 3.1*  3.1*   BILITOT 0.3  0.3   ALKPHOS 113  110   AST 36  38   ALT 33  34   ANIONGAP 8  10   ESTGFRAFRICA 37.3*  37.3*   EGFRNONAA 32.3*  32.3*   , CBC   Recent Labs   Lab 12/07/19  0415   WBC 6.80   HGB 8.1*   HCT 27.2*       and A1C: No results for input(s): HGBA1C in the last 4320 hours.    Urine Culture, Routine Abnormal    ESCHERICHIA COLI   >100,000 cfu/ml   P      Resulting Agency [facility]   Susceptibility      Escherichia coli     CULTURE, URINE (Preliminary)     Amox/K Clav'ate >16/8 mcg/mL Resistant     Amp/Sulbactam >16/8 mcg/mL Resistant     Ampicillin >16 mcg/mL Resistant     Cefazolin 16 mcg/mL Intermediate     Cefepime <=2 mcg/mL Sensitive     Ceftriaxone <=1 mcg/mL Sensitive     Ciprofloxacin <=1 mcg/mL Sensitive     Ertapenem <=0.5 mcg/mL Sensitive     Gentamicin <=4 mcg/mL Sensitive     Levofloxacin <=2 mcg/mL Sensitive     Meropenem <=1 mcg/mL Sensitive     Nitrofurantoin <=32 mcg/mL Sensitive     Piperacillin/Tazo <=16 mcg/mL Sensitive     Tetracycline >8 mcg/mL Resistant     Tobramycin <=4 mcg/mL Sensitive     Trimeth/Sulfa <=2/38 mcg/mL Sensitive             Pending Diagnostic Studies:     Procedure Component Value Units Date/Time    Specimen to Pathology, Radiology Kidney, Ina biopsy [[id number]] Collected:  12/05/19 1500    Order Status:  Sent Lab Status:  In process Updated:  12/06/19 1333        Renal US (12/5):  Impression       Ultrasound-guided renal transplant biopsy.    Small hematoma within the urinary bladder.     Renal US (12/7):  Impression       1. 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  2. Prior clot within the urinary bladder is no longer seen on today's study.  3. Mildly elevated renal arterial resistive indices which may be seen in the setting of chronic rejection or drug toxicity, not significantly changed..  4. Trace free fluid along the renal allograft.  This report was flagged in Epic as abnormal.           Indwelling Lines/Drains at time of discharge:   Lines/Drains/Airways     None                 Time spent on the discharge of patient: > 45 minutes  Patient was seen and examined on the date of discharge and determined to be suitable for discharge.         Shalom Benavidez MD  Department of Hospital Medicine  [facility]

## 2019-12-07 NOTE — ASSESSMENT & PLAN NOTE
59 y.o. year old White female who received a  - brain death kidney and liver transplant on 11 for alcoholic cirrhosis. Baseline Cr 1.3-1.6. Presents with elevated creatinine after kidney biopsy, found to have a bladder hematoma and hematuria which resolved after landers placement    - Strict I/O and chart   - daily weights   - urology consulted   - US Kidney transplant to monitor hematoma  - UA:   Recent Labs   Lab 19  0730   COLORU Straw   SPECGRAV 1.005   PHUR 6.0   PROTEINUA Negative   BACTERIA Occasional     - renally dose all medications   - Avoid nephrotoxic medications, NSAIDs, IV contrast, ACE/ARB.  - Maintain MAP > 65  - Hb > 7 gm/dL   - Will follow closely

## 2019-12-07 NOTE — SUBJECTIVE & OBJECTIVE
Subjective:     History of Present Illness:    58 yo F with a  59 y.o. year old White female who received a  - brain death kidney and liver transplant on 11 for alcoholic cirrhosis. Baseline Cr 1.3-1.6. She had a Biopsy-proven cellular and vascular rejection 12, for which she was treated with Thymoglobulin 150 mg x7 doses.  she has a long-standing history of kidney stones, she required surgery in right kidney (stone extraction). She also had lithotrypsy x 2 in early 20s.   A Txp US  showed two small hyperechoic structures that may represent nonobstructing stones.  Cr increasing to 3.5 from baseline of 1.4. On  she underwent biopsy of the transplanted kidney and developed gross hematuria, with inability to void despite urgency and straining to void. There was associated suprapubic pain. US shows hematoma in bladder (not seen on pre-biopsy US) and hematoma near transplanted kidney. Bladder scan revealed 450 cc; a Hollingsworth was placed with return of 450 cc bloody urine. Her catheter was irrigated by nursing. Urology was consulted. The patient reports no prior gross hematuria and no urinary symptoms prior to this episode. Gross hematuria stopped this morning (.) Cr is stable at 1.9. UA shows 95 RBC, 12 WBC, occasional bacteria. Hollingsworth in place.     KTM consulted for immunosuppression management.     Ms. Lentz is a 59 y.o. year old female who is status post Liver Transplant - 2011  (#1).    She received induction therapy with Thymoglobulin and her maintenance immunosuppression consists of:   Immunosuppressants (From admission, onward)    Start     Stop Route Frequency Ordered    19 1800  tacrolimus capsule 1 mg      -- Oral 2 times daily 19 1551    19 2100  mycophenolate capsule 750 mg      -- Oral 2 times daily 19 1744          Interval History:  Patient admitted for urinary retention and hematuria. Seen by Urology. Hematuria stopped today. Hollingsworth in place.  Creatinine downtrending    Past Medical and Surgical History: Ms. Lentz has a past medical history of ARSEN (acute kidney injury) (2019), Alcoholic cirrhosis (7/10/2012), CKD (chronic kidney disease) stage 3, GFR 30-59 ml/min, -donor kidney transplant (7/10/2012), Essential hypertension (10/22/2018), H/O recurrent pneumonia (10/22/2018), Hypocitraturic calcium nephrolithiasis (2013), Hypothyroidism (7/10/2012), Liver replaced by transplant (7/10/2012), Need for prophylactic immunotherapy (7/10/2012), Pancreatitis ( Aug 2011), Pulmonary embolism (10/22/2018), Recurrent nephrolithiasis (7/10/2012), and Recurrent pneumonia.  She has a past surgical history that includes Kidney transplant; Liver transplant; Tonsillectomy; Appendectomy;  section; Tracheostomy tube placement; Tracheostomy closure (Mar 2012); and Renal biopsy (N/A, 2019).    Past Social and Family History: Ms. Lentz reports that she has never smoked. She has never used smokeless tobacco. She reports that she does not drink alcohol.Her family history is not on file.    Intake/Output - Last 3 Shifts        0700 -  0659 700 -  0659  07 -  0659    P.O.  300 960    Total Intake(mL/kg)  300 (5.7) 960 (18.3)    Urine (mL/kg/hr)  1350 900 (1.5)    Stool  0 0    Total Output  1350 900    Net  -1050 +60           Stool Occurrence  0 x 0 x           Review of Systems   Constitutional: Negative.    HENT: Negative.    Eyes: Negative.    Respiratory: Negative.    Cardiovascular: Negative.    Gastrointestinal: Negative.    Genitourinary: Positive for decreased urine volume and hematuria.   Allergic/Immunologic: Negative.    Neurological: Negative.  Negative for light-headedness and numbness.   Hematological: Negative.    Psychiatric/Behavioral: Negative.      Objective:     Vital Signs (Most Recent):  Temp: 98.1 °F (36.7 °C) (19 164)  Pulse: 76 (19)  Resp: 18 (19)  BP: (!)  "147/79 (12/06/19 1647)  SpO2: (!) 94 % (12/06/19 1647) Vital Signs (24h Range):  Temp:  [97.1 °F (36.2 °C)-98.2 °F (36.8 °C)] 98.1 °F (36.7 °C)  Pulse:  [68-91] 76  Resp:  [10-20] 18  SpO2:  [93 %-100 %] 94 %  BP: (113-147)/(57-79) 147/79     Weight: 52.6 kg (116 lb)  Height: 5' 1" (154.9 cm)  Body mass index is 21.92 kg/m².    Physical Exam   Constitutional: She is oriented to person, place, and time. She appears well-developed and well-nourished.   HENT:   Head: Normocephalic and atraumatic.   Neck: Normal range of motion. Neck supple.   Abdominal: Soft. Bowel sounds are normal.   Neurological: She is alert and oriented to person, place, and time.       Significant Labs:  CBC:   Recent Labs   Lab 12/05/19  1048 12/06/19  0358   WBC 6.30 6.07   RBC 3.77* 3.19*   HGB 10.5* 8.8*   HCT 34.5* 29.6*   * 356*   MCV 92 93   MCH 27.9 27.6   MCHC 30.4* 29.7*     CMP:   Recent Labs   Lab 12/05/19  1048 12/06/19  0358    84   CALCIUM 9.3 8.2*   ALBUMIN 3.9 3.2*   PROT  --  6.2    139   K 4.1 3.5   CO2 19* 20*    110   BUN 38* 35*   CREATININE 2.1* 1.9*   ALKPHOS  --  82   ALT  --  22   AST  --  71*       Diagnostics:  None  "

## 2019-12-07 NOTE — CONSULTS
[facility]  Kidney Transplant  Consult Note    Inpatient consult to Kidney/Pancreas Transplant Surgery  Consult performed by: Lai Gaxiola MD  Consult ordered by: Shalom Benavidez MD  Reason for consult: ARSEN, immunosuppresion management             Subjective:     History of Present Illness:    60 yo F with a  59 y.o. year old White female who received a  - brain death kidney and liver transplant on 11 for alcoholic cirrhosis. Baseline Cr 1.3-1.6. She had a Biopsy-proven cellular and vascular rejection 12, for which she was treated with Thymoglobulin 150 mg x7 doses.  she has a long-standing history of kidney stones, she required surgery in right kidney (stone extraction). She also had lithotrypsy x 2 in early 20s.   A Txp US  showed two small hyperechoic structures that may represent nonobstructing stones.  Cr increasing to 3.5 from baseline of 1.4. On  she underwent biopsy of the transplanted kidney and developed gross hematuria, with inability to void despite urgency and straining to void. There was associated suprapubic pain. US shows hematoma in bladder (not seen on pre-biopsy US) and hematoma near transplanted kidney. Bladder scan revealed 450 cc; a Hollingsworth was placed with return of 450 cc bloody urine. Her catheter was irrigated by nursing. Urology was consulted. The patient reports no prior gross hematuria and no urinary symptoms prior to this episode. Gross hematuria stopped this morning (.) Cr is stable at 1.9. UA shows 95 RBC, 12 WBC, occasional bacteria. Hollingsworth in place.     KTM consulted for immunosuppression management.     Ms. Lentz is a 59 y.o. year old female who is status post Liver Transplant - 2011  (#1).    She received induction therapy with Thymoglobulin and her maintenance immunosuppression consists of:   Immunosuppressants (From admission, onward)    Start     Stop Route Frequency Ordered    19 1800  tacrolimus capsule 1 mg       -- Oral 2 times daily 19 1551    19 2100  mycophenolate capsule 750 mg      -- Oral 2 times daily 19 1744          Interval History:  Patient admitted for urinary retention and hematuria. Seen by Urology. Hematuria stopped today. Hollingsworth in place. Creatinine downtrending    Past Medical and Surgical History: Ms. Lentz has a past medical history of ARSEN (acute kidney injury) (2019), Alcoholic cirrhosis (7/10/2012), CKD (chronic kidney disease) stage 3, GFR 30-59 ml/min, -donor kidney transplant (7/10/2012), Essential hypertension (10/22/2018), H/O recurrent pneumonia (10/22/2018), Hypocitraturic calcium nephrolithiasis (2013), Hypothyroidism (7/10/2012), Liver replaced by transplant (7/10/2012), Need for prophylactic immunotherapy (7/10/2012), Pancreatitis ( Aug 2011), Pulmonary embolism (10/22/2018), Recurrent nephrolithiasis (7/10/2012), and Recurrent pneumonia.  She has a past surgical history that includes Kidney transplant; Liver transplant; Tonsillectomy; Appendectomy;  section; Tracheostomy tube placement; Tracheostomy closure (Mar 2012); and Renal biopsy (N/A, 2019).    Past Social and Family History: Ms. Lentz reports that she has never smoked. She has never used smokeless tobacco. She reports that she does not drink alcohol.Her family history is not on file.    Intake/Output - Last 3 Shifts       700 -  0659  07 -  0659  07 -  0659    P.O.  300 960    Total Intake(mL/kg)  300 (5.7) 960 (18.3)    Urine (mL/kg/hr)  1350 900 (1.5)    Stool  0 0    Total Output  1350 900    Net  -1050 +60           Stool Occurrence  0 x 0 x           Review of Systems   Constitutional: Negative.    HENT: Negative.    Eyes: Negative.    Respiratory: Negative.    Cardiovascular: Negative.    Gastrointestinal: Negative.    Genitourinary: Positive for decreased urine volume and hematuria.   Allergic/Immunologic: Negative.    Neurological:  "Negative.  Negative for light-headedness and numbness.   Hematological: Negative.    Psychiatric/Behavioral: Negative.      Objective:     Vital Signs (Most Recent):  Temp: 98.1 °F (36.7 °C) (19)  Pulse: 76 (19)  Resp: 18 (19)  BP: (!) 147/79 (19)  SpO2: (!) 94 % (19) Vital Signs (24h Range):  Temp:  [97.1 °F (36.2 °C)-98.2 °F (36.8 °C)] 98.1 °F (36.7 °C)  Pulse:  [68-91] 76  Resp:  [10-20] 18  SpO2:  [93 %-100 %] 94 %  BP: (113-147)/(57-79) 147/79     Weight: 52.6 kg (116 lb)  Height: 5' 1" (154.9 cm)  Body mass index is 21.92 kg/m².    Physical Exam   Constitutional: She is oriented to person, place, and time. She appears well-developed and well-nourished.   HENT:   Head: Normocephalic and atraumatic.   Neck: Normal range of motion. Neck supple.   Abdominal: Soft. Bowel sounds are normal.   Neurological: She is alert and oriented to person, place, and time.       Significant Labs:  CBC:   Recent Labs   Lab 19  1048 19  0358   WBC 6.30 6.07   RBC 3.77* 3.19*   HGB 10.5* 8.8*   HCT 34.5* 29.6*   * 356*   MCV 92 93   MCH 27.9 27.6   MCHC 30.4* 29.7*     CMP:   Recent Labs   Lab 19  1048 19  0358    84   CALCIUM 9.3 8.2*   ALBUMIN 3.9 3.2*   PROT  --  6.2    139   K 4.1 3.5   CO2 19* 20*    110   BUN 38* 35*   CREATININE 2.1* 1.9*   ALKPHOS  --  82   ALT  --  22   AST  --  71*       Diagnostics:  None    Assessment/Plan:     * Acute urinary retention   59 y.o. year old White female who received a  - brain death kidney and liver transplant on 11 for alcoholic cirrhosis. Baseline Cr 1.3-1.6.     As per Urology:   - gross hematuria due to biopsy; no need for further workup; appears to have resolved now  - recommend discontinuing Hollingsworth tomorrow, attempt voiding trial; if patient fails voiding trial, recommend straight catheterizing to obtain PVR and starting CIC  - encourage ambulation; minimize " narcotics; treat any constipation    ARSEN (acute kidney injury)   59 y.o. year old White female who received a  - brain death kidney and liver transplant on 11 for alcoholic cirrhosis. Baseline Cr 1.3-1.6. Presents with elevated creatinine after kidney biopsy, found to have a bladder hematoma and hematuria which resolved after landers placement    - Strict I/O and chart   - daily weights   - urology consulted   - US Kidney transplant to monitor hematoma  - UA:   Recent Labs   Lab 19  0730   COLORU Straw   SPECGRAV 1.005   PHUR 6.0   PROTEINUA Negative   BACTERIA Occasional     - renally dose all medications   - Avoid nephrotoxic medications, NSAIDs, IV contrast, ACE/ARB.  - Maintain MAP > 65  - Hb > 7 gm/dL   - Will follow closely       -donor kidney transplant  Combined Liver and kidney transplant, complicated with vascular rejection in 12, treated with Thymoglobulin 150 mg x 7 days  Cont prograf 2 mg BID and mycophenolate 750 mg BID, cont prednisone 5 mg daily, will decrease to 1 mg BID due to supra therapeutic levels   Prograf levels daily, latest one at 10.3          Discharge Planning:  Pending voiding trial       Lai Gaxiola MD  Kidney Transplant  [facility]

## 2019-12-07 NOTE — NURSING
Patient discharge instructions provided. Medications reconciled. There are no further concerns at this time. Patient refused wheelchair. Walked out with nurse. Patient safely escorted to car.

## 2019-12-07 NOTE — ASSESSMENT & PLAN NOTE
59 y.o. year old White female who received a  - brain death kidney and liver transplant on 11 for alcoholic cirrhosis. Baseline Cr 1.3-1.6.     As per Urology:   - gross hematuria due to biopsy; no need for further workup; appears to have resolved now  - recommend discontinuing Hollingsworth tomorrow, attempt voiding trial; if patient fails voiding trial, recommend straight catheterizing to obtain PVR and starting CIC   - encourage ambulation; minimize narcotics; treat any constipation

## 2019-12-07 NOTE — PLAN OF CARE
Plan of care reviewed with patient. AAOx4. VSS. Verbalized understanding. Patient pain manage by ordered pain regimen. Ambulated in room independently. Remains free of any falls or trauma. Patient has urinated a total of 300 mL since previous bladder scan of a maximum of 314 mL post void. Band-Aid to abdomen remains clean and intact. There are no other concerns at this time. Will continue to monitor while in care.

## 2019-12-07 NOTE — ASSESSMENT & PLAN NOTE
Combined Liver and kidney transplant, complicated with vascular rejection in 1/12/12, treated with Thymoglobulin 150 mg x 7 days  Cont prograf 2 mg BID and mycophenolate 750 mg BID, cont prednisone 5 mg daily, will decrease to 1 mg BID due to supra therapeutic levels   Prograf levels daily, latest one at 10.3

## 2019-12-07 NOTE — PLAN OF CARE
POC reviewed with patient who verbalized understanding.  AAOx4, vss on room air.  Regular diet w/ no c/o nausea.  NS @ 75 ml/hr.  [place] patent and intact draining clear, yellow urine.  Pain controlled w/ prn Dilaudid.  No acute events this shift.  Will continue to monitor patient.

## 2019-12-08 DIAGNOSIS — Z94.4 STATUS POST LIVER TRANSPLANTATION: Chronic | ICD-10-CM

## 2019-12-08 RX ORDER — TACROLIMUS 1 MG/1
CAPSULE ORAL
Qty: 120 CAPSULE | Refills: 3
Start: 2019-12-08 | End: 2019-12-13

## 2019-12-08 RX ORDER — NITROFURANTOIN 25; 75 MG/1; MG/1
100 CAPSULE ORAL 2 TIMES DAILY
Qty: 10 CAPSULE | Refills: 0 | Status: SHIPPED | OUTPATIENT
Start: 2019-12-08 | End: 2020-12-15

## 2019-12-09 LAB — BACTERIA UR CULT: ABNORMAL

## 2019-12-09 RX ORDER — VALACYCLOVIR HYDROCHLORIDE 1 G/1
1000 TABLET, FILM COATED ORAL DAILY
Qty: 30 TABLET | Refills: 11 | Status: SHIPPED | OUTPATIENT
Start: 2019-12-09 | End: 2020-12-08

## 2019-12-09 NOTE — PLAN OF CARE
12/09/19 0740   Final Note   Assessment Type Final Discharge Note     Patient discharged to her daughter's house (Dr. Hazel Lundberg, [address], [place], [place], [address], [phone]) on 12/7/19. CM informed the patient of a hospital follow up appointments scheduled with Dr. Leti Alexander (PCP) on 12/19/19 at 0900 & Dr. Antonio Tabares (urol) on 1/9/20 at 1340. Message sent to Dr. Hill Hughes (neph) requesting a hospital follow up appointment in 1-2 weeks. Dr Hughes's  to call the patient with appointment date & time. Patient to call this CM with contact number for urologist she would like to get established with. Will continue to follow. Patient verbalized understanding. Discharge summary faxed to Dr. Leti Alexander f [phone], Dr. Antonio Tabares f [phone], Dr Hill Hughes f [phone], & [facility].

## 2019-12-10 ENCOUNTER — PATIENT MESSAGE (OUTPATIENT)
Dept: TRANSPLANT | Facility: CLINIC | Age: 59
End: 2019-12-10

## 2019-12-11 ENCOUNTER — TELEPHONE (OUTPATIENT)
Dept: TRANSPLANT | Facility: CLINIC | Age: 59
End: 2019-12-11

## 2019-12-13 DIAGNOSIS — Z94.4 STATUS POST LIVER TRANSPLANTATION: Chronic | ICD-10-CM

## 2019-12-13 LAB
EXT ALBUMIN: 3.8
EXT ALKALINE PHOSPHATASE: 235
EXT ALT: 54
EXT AST: 43
EXT BILIRUBIN TOTAL: 0.2
EXT BUN: 39
EXT CALCIUM: 8.4
EXT CHLORIDE: 96
EXT CO2: 26
EXT CREATININE: 1.96 MG/DL
EXT GLUCOSE: 87
EXT HEMATOCRIT: 26.6
EXT HEMOGLOBIN: 8.7
EXT PLATELETS: 343
EXT POTASSIUM: 4.8
EXT PROTEIN TOTAL: 6.8
EXT SODIUM: 131 MMOL/L
EXT TACROLIMUS LVL: 3.4
EXT WBC: 7.4

## 2019-12-13 NOTE — TELEPHONE ENCOUNTER
Called pt to discuss.  Instructed pt to start taking Prograf 1.5 mg in AM and 1 mg in PM.  RX for 0.5 mg capsules sent to pharmacy.  Instructed pt to notify me when she begins new dose to schedule next lab appointment.  Verbalized understanding.

## 2019-12-13 NOTE — TELEPHONE ENCOUNTER
----- Message from Cherelle Kothari sent at 12/13/2019  2:35 PM CST -----  Contact: Virginia Russo called to f/u on a biopsy and lab results she had on last week. [phone]

## 2019-12-13 NOTE — TELEPHONE ENCOUNTER
----- Message from Qi Peñaloza RN sent at 12/13/2019  2:32 PM CST -----      ----- Message -----  From: Isa Rasheed MD  Sent: 12/13/2019   2:24 PM CST  To: [facility] Post-Kidney Transplant Clinical    Please prograf 1.5/1 mg BID and check the level in 1-2 weeks. Thank you!

## 2019-12-16 ENCOUNTER — TELEPHONE (OUTPATIENT)
Dept: TRANSPLANT | Facility: CLINIC | Age: 59
End: 2019-12-16

## 2019-12-16 RX ORDER — TACROLIMUS 0.5 MG/1
0.5 CAPSULE ORAL DAILY
Qty: 30 CAPSULE | Refills: 11 | Status: SHIPPED | OUTPATIENT
Start: 2019-12-16 | End: 2020-12-15

## 2019-12-16 RX ORDER — TACROLIMUS 1 MG/1
1 CAPSULE ORAL EVERY 12 HOURS
Qty: 60 CAPSULE | Refills: 11 | Status: SHIPPED | OUTPATIENT
Start: 2019-12-16 | End: 2020-12-15

## 2019-12-16 RX ORDER — MYCOPHENOLATE MOFETIL 250 MG/1
CAPSULE ORAL
Qty: 180 CAPSULE | Refills: 0 | Status: SHIPPED | OUTPATIENT
Start: 2019-12-16 | End: 2020-01-09

## 2019-12-16 NOTE — TELEPHONE ENCOUNTER
----- Message from Sumit Pleitez sent at 12/16/2019  9:28 AM CST -----  Contact: pt  Calling to speak with coordinator about her results pt states she's been waiting all last week     Call back: [phone]

## 2019-12-16 NOTE — TELEPHONE ENCOUNTER
Pt returned call.  Notified pt that Dr. Rasheed reported trying to contact her twice.  Pt reports she did not receive any communication from Dr. Rasheed.  Message sent to Dr. Rasheed w/ pt's cell phone number to discuss biopsy results.  Instructed pt to repeat labs this week.  Pt reports she will have labs drawn on Thursday.

## 2019-12-16 NOTE — TELEPHONE ENCOUNTER
----- Message from Ruben Ferrell MD sent at 12/16/2019  8:21 AM CST -----  Results reviewed  Repeat labs this week please

## 2019-12-16 NOTE — PHYSICIAN QUERY
The biopsy was done on the only renal transplant that she has which is in the left lower quadrant.      The ultrasound report from 12/7/19 should say that the transplant is in the left lower quadrant.  I did not dictate that study so I cannot change that report or do an addendum.  It would have to be done by the people who read that study.    PT Name: Virginia Lentz  MR #: [id number]     Physician Query Form - Documentation Clarification      CDS/: Km JIMENEZ, RN-BC  Contact information: [email or web address]    This form is a permanent document in the medical record.     Query Date: December 16, 2019    By submitting this query, we are merely seeking further clarification of documentation. Please utilize your independent clinical judgment when addressing the question(s) below.    The Medical record reflects the following:     Supporting Clinical Findings Location in Medical Record     Renal Transplant Biopsy-Findings:   Renal transplant biopsy. Small amount of blood adjacent to the kidney as well as in the bladder. Monitored with serial ultrasounds over 2 hours with no change in size. Patient tolerated procedure well. Spoke with nephrology who will admit patient to Internal Medicine.         Interventional Radiology, Post Procedure Note, Dr. Randhawa/Dr. Campos, 12/5/19   TECHNIQUE:  Transplant renal ultrasound of the left lower quadrant with color flow doppler and duplex analysis.          TECHNIQUE:  Real time gray scale and doppler ultrasound was performed over the patient's renal allograft.  FINDINGS:  Renal allograft in the right lower quadrant US Transplant kidney 12/5/19                US Transplant kidney 12/7/19                                                                          Doctor, Please specify diagnosis or diagnoses associated with above clinical findings.    Doctor, Please clarify the site of the kidney biopsy.    Provider Use Only   [  ] Left kidney   [  ] Right kidney   [  ] Other,  please specify,______________________________       [  ] Clinically Undetermined

## 2019-12-18 LAB
FINAL PATHOLOGIC DIAGNOSIS: NORMAL
GROSS: NORMAL

## 2019-12-19 ENCOUNTER — PATIENT MESSAGE (OUTPATIENT)
Dept: TRANSPLANT | Facility: CLINIC | Age: 59
End: 2019-12-19

## 2019-12-19 ENCOUNTER — TELEPHONE (OUTPATIENT)
Dept: TRANSPLANT | Facility: CLINIC | Age: 59
End: 2019-12-19

## 2019-12-19 NOTE — TELEPHONE ENCOUNTER
----- Message from Qi Peñaloza RN sent at 12/19/2019 10:25 AM CST -----  Contact: Pt      ----- Message -----  From: Irwin Mclain  Sent: 12/19/2019  10:20 AM CST  To: [facility] Post-Kidney Transplant Clinical    Pt would like to speak with her nurse about labs and orders.    Pt# [phone]

## 2019-12-26 ENCOUNTER — TELEPHONE (OUTPATIENT)
Dept: TRANSPLANT | Facility: CLINIC | Age: 59
End: 2019-12-26

## 2020-01-02 ENCOUNTER — TELEPHONE (OUTPATIENT)
Dept: TRANSPLANT | Facility: CLINIC | Age: 60
End: 2020-01-02

## 2020-01-02 NOTE — TELEPHONE ENCOUNTER
"Pt reports she had labs drawn "about a week and a half ago".  No results received after calling pt's lab.  Staff reports pt did not have labs drawn, no results available.  Pt reports she is out of town and will have labs drawn on Monday.  Standing order re-faxed to Dr. Hou office.  "

## 2020-01-02 NOTE — TELEPHONE ENCOUNTER
----- Message from Nichol Baldwin sent at 1/2/2020  3:48 PM CST -----  Contact: pt  Patient calling for test results    Call Back : [phone]

## 2020-01-07 ENCOUNTER — TELEPHONE (OUTPATIENT)
Dept: TRANSPLANT | Facility: CLINIC | Age: 60
End: 2020-01-07

## 2020-01-07 NOTE — TELEPHONE ENCOUNTER
----- Message from Hannah Schulte sent at 1/7/2020  2:39 PM CST -----  Contact: Virginia  tel:   [phone] cell   Calling for liver  test results.   Asking for a return call.

## 2020-01-09 ENCOUNTER — PATIENT MESSAGE (OUTPATIENT)
Dept: TRANSPLANT | Facility: CLINIC | Age: 60
End: 2020-01-09

## 2020-01-09 LAB
EXT ALBUMIN: 4.1
EXT ALKALINE PHOSPHATASE: 87
EXT ALT: 12
EXT AST: 24
EXT BILIRUBIN TOTAL: 0.3
EXT BUN: 46
EXT CALCIUM: 8.6
EXT CHLORIDE: 103
EXT CO2: 21
EXT CREATININE: 2.59 MG/DL
EXT GLUCOSE: 102
EXT HEMATOCRIT: 31
EXT HEMOGLOBIN: 10.1
EXT PLATELETS: 340
EXT POTASSIUM: 3.9
EXT PROTEIN TOTAL: 7.5
EXT SODIUM: 133 MMOL/L
EXT TACROLIMUS LVL: 5.3
EXT WBC: 8.8

## 2020-01-09 RX ORDER — MYCOPHENOLATE MOFETIL 250 MG/1
CAPSULE ORAL
Qty: 180 CAPSULE | Refills: 0 | Status: SHIPPED | OUTPATIENT
Start: 2020-01-09 | End: 2020-02-17

## 2020-01-10 NOTE — TELEPHONE ENCOUNTER
----- Message from Isa Rasheed MD sent at 1/9/2020  2:57 PM CST -----  continue to take water 2-2.5 liter day. Lab in 2 weeks. Thank you!

## 2020-01-10 NOTE — TELEPHONE ENCOUNTER
Called pt to discuss.  Instructed to drink 2-2.5 liters water daily and repeat labs on 1/20/20.  Pt verbalized understanding and reports she will see her local nephrologist the week of 1/20/20 and will have labs drawn at that time.

## 2020-01-11 ENCOUNTER — TELEPHONE (OUTPATIENT)
Dept: TRANSPLANT | Facility: CLINIC | Age: 60
End: 2020-01-11

## 2020-01-11 NOTE — TELEPHONE ENCOUNTER
----- Message from Isa Rasheed MD sent at 1/11/2020 10:35 AM CST -----  Regarding: RE: Allosure test  No, we did already kidney biopsy and it show no rejection. Thank you!  ----- Message -----  From: Angelic Spence RN  Sent: 1/10/2020  11:24 AM CST  To: Isa Rasheed MD  Subject: Allosure test                                    I was notified by the lab that Allosure test was lost in the mail and will not result.  This was drawn on inpatient stay at the end of November.  Does this need to be redrawn?  Thanks,  Angelic Spence RN, BSN

## 2020-01-17 ENCOUNTER — TELEPHONE (OUTPATIENT)
Dept: TRANSPLANT | Facility: CLINIC | Age: 60
End: 2020-01-17

## 2020-01-17 ENCOUNTER — PATIENT MESSAGE (OUTPATIENT)
Dept: TRANSPLANT | Facility: CLINIC | Age: 60
End: 2020-01-17

## 2020-01-17 LAB
EXT ALBUMIN: 4
EXT ALKALINE PHOSPHATASE: 146
EXT ALT: 23
EXT AST: 16
EXT BILIRUBIN TOTAL: 0.2
EXT BUN: 43
EXT CALCIUM: 9
EXT CHLORIDE: 101
EXT CO2: 22
EXT CREATININE: 2.35 MG/DL
EXT GFR MDRD NON AF AMER: 22.3
EXT GLUCOSE: 83
EXT HEMATOCRIT: 32.2
EXT HEMOGLOBIN: 10.4
EXT PLATELETS: 486
EXT POTASSIUM: 3.9
EXT PROTEIN TOTAL: 7.2
EXT SODIUM: 135 MMOL/L
EXT TACROLIMUS LVL: 4.6
EXT WBC: 7.5

## 2020-01-17 NOTE — TELEPHONE ENCOUNTER
Stable labs, no medication changes.  Next labs 3/30/2020, letter and MyChart message sent. Pending review from Dr. Rasheed.

## 2020-01-17 NOTE — LETTER
January 17, 2020    Virginia Lentz  [address] [facility] [address]          Dear Virginia Lentz:  MRN: [id number]    This is a follow up to your recent labs, your lab results were stable.  There are no medicine changes.  Please have your liver transplant labs drawn again on 3/30/2020.      If you cannot have your labs drawn on the scheduled date, it is your responsibility to call the transplant department to reschedule.  To reschedule or make an appointment, please as to speak to or leave a message for my assistant, Angelia Torrez or Gogo, at [phone].  When leaving a message for Angelia Torrez Angela or myself, we ask that you leave a brief message regarding your request.    Sincerely,      Angelic Spence RN, BSN  Your Liver Transplant Coordinator    [facility]  [address] [place] [address]  [phone]

## 2020-01-17 NOTE — TELEPHONE ENCOUNTER
----- Message from Angelina Cain sent at 1/17/2020  1:05 PM CST -----  Pt is calling to verify if Nievesvish received the results from her labs this week      Pt contact [phone]

## 2020-01-20 ENCOUNTER — TELEPHONE (OUTPATIENT)
Dept: TRANSPLANT | Facility: CLINIC | Age: 60
End: 2020-01-20

## 2020-01-20 NOTE — TELEPHONE ENCOUNTER
----- Message from Isa Rasheed MD sent at 1/19/2020  1:52 PM CST -----  Please order UA and UPCR next week. I try to wrap up a plan from kidney standpoint. Thank you

## 2020-01-28 LAB
EXT CREATININE UA: 15
EXT PROT/CREAT RATIO UR: 1.93 MG/G
EXT PROTEIN UA: 29

## 2020-01-29 ENCOUNTER — TELEPHONE (OUTPATIENT)
Dept: TRANSPLANT | Facility: CLINIC | Age: 60
End: 2020-01-29

## 2020-01-29 NOTE — TELEPHONE ENCOUNTER
----- Message from Sumit Pleitez sent at 1/29/2020 10:34 AM CST -----  Contact: pt  Calling to speak with coordinator    Call back: [phone]

## 2020-02-07 ENCOUNTER — NURSE TRIAGE (OUTPATIENT)
Dept: ADMINISTRATIVE | Facility: CLINIC | Age: 60
End: 2020-02-07

## 2020-02-07 NOTE — TELEPHONE ENCOUNTER
Spoke to pt's  and confirmed she is currently in the ER at [facility]. He states her temp was 103 upon arrival. Advised to have him pass along to treating providers if assistance is needed to contact the transplant hepatologist on call through the . Otherwise he will keep us updated of her status.

## 2020-02-07 NOTE — TELEPHONE ENCOUNTER
Reason for Disposition   Sounds like a life-threatening emergency to the triager    Additional Information   Negative: Shock suspected (e.g., cold/pale/clammy skin, too weak to stand, low BP, rapid pulse)   Negative: Difficult to awaken or acting confused (e.g., disoriented, slurred speech)   Negative: Bluish (or gray) lips or face now   Negative: New onset rash with many purple (or blood-colored) spots or dots    Protocols used: CANCER - FEVER-A-AH  Kidney/liver tx 11/2011  BPA 4   Woke with HA, felt febrile T99.7 now up to 102.7  Coughing. Admits to HA and stiff neck. No rash. +SOB but not severe. rec EMS. Pt agrees. Office message sent at pt request. Call back with questions.

## 2020-02-12 ENCOUNTER — PATIENT MESSAGE (OUTPATIENT)
Dept: TRANSPLANT | Facility: CLINIC | Age: 60
End: 2020-02-12

## 2020-02-17 RX ORDER — MYCOPHENOLATE MOFETIL 250 MG/1
CAPSULE ORAL
Qty: 180 CAPSULE | Refills: 0 | Status: SHIPPED | OUTPATIENT
Start: 2020-02-17 | End: 2020-03-11

## 2020-03-11 RX ORDER — MYCOPHENOLATE MOFETIL 250 MG/1
CAPSULE ORAL
Qty: 180 CAPSULE | Refills: 0 | Status: SHIPPED | OUTPATIENT
Start: 2020-03-11 | End: 2020-05-14

## 2020-04-09 ENCOUNTER — PATIENT MESSAGE (OUTPATIENT)
Dept: TRANSPLANT | Facility: CLINIC | Age: 60
End: 2020-04-09

## 2020-04-30 ENCOUNTER — PATIENT MESSAGE (OUTPATIENT)
Dept: TRANSPLANT | Facility: CLINIC | Age: 60
End: 2020-04-30

## 2020-05-01 ENCOUNTER — TELEPHONE (OUTPATIENT)
Dept: TRANSPLANT | Facility: CLINIC | Age: 60
End: 2020-05-01

## 2020-05-01 NOTE — TELEPHONE ENCOUNTER
----- Message from Qi Peñaloza RN sent at 4/30/2020  1:43 PM CDT -----  Contact: pt      ----- Message -----  From: Gonzales Macias  Sent: 4/30/2020   1:24 PM CDT  To: [facility] Post-Kidney Transplant Clinical    Pt calling to see if coord received labs      [phone](F)

## 2020-05-08 LAB
25(OH)D3 SERPL-MCNC: 36.1 NG/ML
CREATININE RANDOM URINE: 48.1 MG/DL
EXT ALBUMIN: 4.9
EXT BASOPHIL%: 1.2
EXT BUN: 23
EXT CALCIUM: 9.9
EXT CHLORIDE: 96
EXT CO2: 24
EXT CREATININE: 1.59 MG/DL
EXT EOSINOPHIL%: 4.5
EXT GLUCOSE: 102
EXT HEMATOCRIT: 39.2
EXT HEMOGLOBIN: 12.8
EXT LYMPH%: 21.7
EXT MONOCYTES%: 9.7
EXT PHOSPHORUS: 3.6
EXT PLATELETS: 464
EXT POTASSIUM: 3.8
EXT SEGS%: 62.9
EXT SODIUM: 133 MMOL/L
EXT TACROLIMUS LVL: 6.3
EXT WBC: 5.8
PROTEIN URINE RANDOM: 59
PTH-INTACT SERPL-MCNC: 69.3 PG/ML

## 2020-05-14 RX ORDER — MYCOPHENOLATE MOFETIL 250 MG/1
CAPSULE ORAL
Qty: 180 CAPSULE | Refills: 0 | Status: SHIPPED | OUTPATIENT
Start: 2020-05-14 | End: 2020-07-10

## 2020-07-02 ENCOUNTER — TELEPHONE (OUTPATIENT)
Dept: TRANSPLANT | Facility: CLINIC | Age: 60
End: 2020-07-02

## 2020-07-02 NOTE — TELEPHONE ENCOUNTER
----- Message from Angelic Spence RN sent at 5/13/2020 12:38 PM CDT -----      ----- Message -----  From: Ruben Ferrell MD  Sent: 5/8/2020  12:52 PM CDT  To: [facility] Post-Liver Transplant Clinical    Results reviewed  No LFTs?

## 2020-07-10 ENCOUNTER — TELEPHONE (OUTPATIENT)
Dept: TRANSPLANT | Facility: CLINIC | Age: 60
End: 2020-07-10

## 2020-07-16 ENCOUNTER — TELEPHONE (OUTPATIENT)
Dept: TRANSPLANT | Facility: CLINIC | Age: 60
End: 2020-07-16

## 2020-07-21 ENCOUNTER — TELEPHONE (OUTPATIENT)
Dept: TRANSPLANT | Facility: CLINIC | Age: 60
End: 2020-07-21

## 2020-07-21 NOTE — TELEPHONE ENCOUNTER
----- Message from Umu Gamboa RN sent at 7/21/2020 10:24 AM CDT -----    ----- Message -----  From: Gonzales Macias  Sent: 7/21/2020  10:08 AM CDT  To: [facility] Post-Kidney Transplant Clinical    Pt calling to speak with coord regarding results.       [phone] (M)

## 2020-07-27 LAB
EXT ALBUMIN: 4.4
EXT ALKALINE PHOSPHATASE: 69
EXT ALT: 5
EXT AST: 16
EXT BILIRUBIN TOTAL: 0.4
EXT BUN: 25
EXT CALCIUM: 8.1
EXT CHLORIDE: 107
EXT CO2: 15
EXT CREATININE: 1.94 MG/DL
EXT GLUCOSE: 137
EXT HEMATOCRIT: 34.7
EXT HEMOGLOBIN: 11.6
EXT PLATELETS: 340
EXT POTASSIUM: 3.2
EXT PROTEIN TOTAL: 7.6
EXT SODIUM: 137 MMOL/L
EXT TACROLIMUS LVL: 4.3
EXT WBC: 6.4

## 2020-07-30 ENCOUNTER — TELEPHONE (OUTPATIENT)
Dept: TRANSPLANT | Facility: CLINIC | Age: 60
End: 2020-07-30

## 2020-07-30 NOTE — TELEPHONE ENCOUNTER
Stable labs,no medication changes.  Next labs 10/19/20, letter sent.  ----- Message from Ruben Ferrell MD sent at 7/29/2020  3:27 PM CDT -----  Results reviewed

## 2020-07-30 NOTE — LETTER
July 30, 2020    Virginia Lentz  [address] [facility] [address] [facility] [address]          Dear Virginia Lentz:  MRN: [id number]    This is a follow up to your recent labs, your lab results were stable.  There are no medicine changes.  Please have your labs drawn again on 10/19/20.      If you cannot have your labs drawn on the scheduled date, it is your responsibility to call the transplant department to reschedule.  Please call [phone] and ask to speak to Gogo MEEK   for all scheduling requests.     Sincerely,      Angelic Spence RN, BSN  Your Liver Transplant Coordinator    [facility]  [address] [place] [address]  [phone]

## 2020-09-01 ENCOUNTER — TELEPHONE (OUTPATIENT)
Dept: TRANSPLANT | Facility: CLINIC | Age: 60
End: 2020-09-01

## 2020-11-19 ENCOUNTER — TELEPHONE (OUTPATIENT)
Dept: TRANSPLANT | Facility: CLINIC | Age: 60
End: 2020-11-19

## 2020-12-02 ENCOUNTER — TELEPHONE (OUTPATIENT)
Dept: TRANSPLANT | Facility: CLINIC | Age: 60
End: 2020-12-02

## 2020-12-07 LAB
EXT ALBUMIN: 4.1
EXT ALKALINE PHOSPHATASE: 92
EXT ALT: 4
EXT AST: 14
EXT BILIRUBIN TOTAL: 0.3
EXT BUN: 30
EXT BUN: 30
EXT CALCIUM: 8.6
EXT CHLORIDE: 101
EXT CHLORIDE: 101
EXT CO2: 23
EXT CO2: 23
EXT CREATININE: 1.66 MG/DL
EXT CREATININE: 1.66 MG/DL
EXT EOSINOPHIL%: 0.6
EXT EOSINOPHIL%: 0.6
EXT GFR MDRD NON AF AMER: 33.4
EXT GLUCOSE: 94
EXT HEMATOCRIT: 31.7
EXT HEMATOCRIT: 31.7
EXT HEMOGLOBIN: 10.1
EXT HEMOGLOBIN: 10.1
EXT LYMPH%: 12.3
EXT LYMPH%: 12.3
EXT MONOCYTES%: 8.4
EXT MONOCYTES%: 8.4
EXT PLATELETS: 377
EXT PLATELETS: 377
EXT POTASSIUM: 4.4
EXT POTASSIUM: 4.4
EXT PROT/CREAT RATIO UR: 2.7
EXT PROTEIN TOTAL: 7.1
EXT SEGS%: 78.2
EXT SEGS%: 78.2
EXT SODIUM: 132 MMOL/L
EXT SODIUM: 132 MMOL/L
EXT TACROLIMUS LVL: 3.4
EXT TACROLIMUS LVL: 3.7
EXT WBC: 6.2
EXT WBC: 6.2

## 2020-12-12 ENCOUNTER — PATIENT MESSAGE (OUTPATIENT)
Dept: TRANSPLANT | Facility: CLINIC | Age: 60
End: 2020-12-12

## 2020-12-14 ENCOUNTER — PATIENT MESSAGE (OUTPATIENT)
Dept: TRANSPLANT | Facility: CLINIC | Age: 60
End: 2020-12-14

## 2020-12-14 ENCOUNTER — TELEPHONE (OUTPATIENT)
Dept: TRANSPLANT | Facility: CLINIC | Age: 60
End: 2020-12-14

## 2020-12-14 PROCEDURE — 86832 HLA CLASS I HIGH DEFIN QUAL: CPT | Mod: PO

## 2020-12-14 PROCEDURE — 86833 HLA CLASS II HIGH DEFIN QUAL: CPT | Mod: PO

## 2020-12-14 PROCEDURE — 86977 RBC SERUM PRETX INCUBJ/INHIB: CPT | Mod: PO

## 2020-12-15 ENCOUNTER — OFFICE VISIT (OUTPATIENT)
Dept: TRANSPLANT | Facility: CLINIC | Age: 60
End: 2020-12-15
Payer: COMMERCIAL

## 2020-12-15 ENCOUNTER — TELEPHONE (OUTPATIENT)
Dept: TRANSPLANT | Facility: CLINIC | Age: 60
End: 2020-12-15

## 2020-12-15 DIAGNOSIS — N18.31 STAGE 3A CHRONIC KIDNEY DISEASE: Chronic | ICD-10-CM

## 2020-12-15 DIAGNOSIS — I10 ESSENTIAL HYPERTENSION: ICD-10-CM

## 2020-12-15 DIAGNOSIS — Z94.4 STATUS POST LIVER TRANSPLANTATION: Primary | Chronic | ICD-10-CM

## 2020-12-15 DIAGNOSIS — Z94.0 KIDNEY REPLACED BY TRANSPLANT: ICD-10-CM

## 2020-12-15 DIAGNOSIS — D84.9 IMMUNOSUPPRESSION: Chronic | ICD-10-CM

## 2020-12-15 DIAGNOSIS — Z94.0 DECEASED-DONOR KIDNEY TRANSPLANT: Chronic | ICD-10-CM

## 2020-12-15 DIAGNOSIS — Z94.0 KIDNEY TRANSPLANT RECIPIENT: Primary | ICD-10-CM

## 2020-12-15 PROCEDURE — 99213 OFFICE O/P EST LOW 20 MIN: CPT | Mod: 95,,, | Performed by: INTERNAL MEDICINE

## 2020-12-15 PROCEDURE — 99215 OFFICE O/P EST HI 40 MIN: CPT | Mod: 95,,, | Performed by: INTERNAL MEDICINE

## 2020-12-15 PROCEDURE — 99215 PR OFFICE/OUTPT VISIT, EST, LEVL V, 40-54 MIN: ICD-10-PCS | Mod: 95,,, | Performed by: INTERNAL MEDICINE

## 2020-12-15 PROCEDURE — 99213 PR OFFICE/OUTPT VISIT, EST, LEVL III, 20-29 MIN: ICD-10-PCS | Mod: 95,,, | Performed by: INTERNAL MEDICINE

## 2020-12-15 RX ORDER — MYCOPHENOLATE MOFETIL 250 MG/1
500 CAPSULE ORAL 2 TIMES DAILY
Qty: 120 CAPSULE | Refills: 11 | Status: SHIPPED | OUTPATIENT
Start: 2020-12-15 | End: 2022-01-10

## 2020-12-15 RX ORDER — TACROLIMUS 0.5 MG/1
CAPSULE ORAL
Qty: 30 CAPSULE | Refills: 0 | Status: SHIPPED | OUTPATIENT
Start: 2020-12-15 | End: 2021-01-12

## 2020-12-15 RX ORDER — ERGOCALCIFEROL 1.25 MG/1
50000 CAPSULE ORAL
Qty: 12 CAPSULE | Refills: 0 | Status: SHIPPED | OUTPATIENT
Start: 2020-12-15 | End: 2020-12-16 | Stop reason: SDUPTHER

## 2020-12-15 NOTE — LETTER
December 16, 2020    Virginia Lentz  [address] [facility] [address]          Dear Virginia Lentz:  MRN: [id number]    This is a follow up to your recent labs, your lab results were stable.  There are no medicine changes.  Please have your labs drawn again on 3/8/21.      If you cannot have your labs drawn on the scheduled date, it is your responsibility to call the transplant department to reschedule.  Please call [phone] and ask to speak to Gogo MEEK   for all scheduling requests.     Sincerely,      Angelic Spence RN, BSN  Your Liver Transplant Coordinator    [facility]  [address] [place] [address]  [phone]

## 2020-12-15 NOTE — PROGRESS NOTES
The patient location is:  Patient Home   The chief complaint leading to consultation is: follow up  Visit type: Virtual visit with synchronous audio and video  Total time spent with patient:  20  Each patient to whom he or she provides medical services by telemedicine is:  (1) informed of the relationship between the physician and patient and the respective role of any other health care provider with respect to management of the patient; and (2) notified that he or she may decline to receive medical services by telemedicine and may withdraw from such care at any time.         Kidney/Liver Post-Transplant Assessment    Referring Physician: Melissa Garcias  Current Nephrologist:     ORGAN: RIGHT KIDNEY  Donor Type: donation after brain death   PHS Increased Risk: no  Cold Ischemia: 480 mins  Induction Medications: steroids (prednisone,methylprednisolone,solumedrol,medrol,decadron)  CMV Status:    Subjective:     CC:  Reassessment of renal allograft function and management of chronic immunosuppression.    Kidney History:  Ms. Lentz is a 59 y.o. year old White female who received a  - brain death kidney and liver transplant on 11 for alcoholic cirrhosis. Baseline Cr 1.3-1.6.     Post transplant history:   - Biopsy-proven cellular and vascular rejection 12, Treated with Thymoglobulin 150 mg x7 doses.    - long-standing history of kidney stones, she required surgery in right kidney (stone extraction). She also had lithotrypsy x 2 in early 20s.   A Txp US  showed two small hyperechoic structures that may represent nonobstructing stones.    - Recurrent Pneumonia  - ARF 2019 creat 3.52 improved to 1.7  - CKD 3 : Cr of 1.7    She sees her nephrologist in [place].  He Cr stable at 1.7 in last few months. Her home BP is stable. She denies fever, chills and  hematuria, frequency, urgency or any other symptoms attributable to the urinary tract.          Review of Systems   Constitutional:  Negative for fatigue  Respiratory: Negative for shortness of breath.    Cardiovascular: Negative for chest pain.   Gastrointestinal: Negative for nausea, vomiting and abdominal pain.   Genitourinary: Negative for dysuria and decreased urine volume.   Musculoskeletal: Negative for gait problem.   Neurological: Negative for seizures and weakness.   Hematological: Does not bruise/bleed easily.   Psychiatric/Behavioral: Negative for sleep disturbance.                    Objective:   There were no vitals taken for this visit.body mass index is unknown because there is no height or weight on file.    Physical Exam  General: No acute distress, well groomed  Respiratory: Clear to auscultation bilaterally, respirations unlabored, no rales  Cardiovacular: Regular rate and rhythm  Gastrointestinal: Soft, non-tender, bowel sounds normal, no hepatosplenomegaly  Renal allograft exam: No tenderness, no bruits, normal exam  Musculoskeletal: No knee or ankle joint tenderness or swelling.   Skin: Bilateral gluteal rashes-crusted   Neurologic: CN grossly intact,  alert and oriented x 3      Labs:  Lab Results   Component Value Date    WBC 6.80 12/07/2019    HGB 8.1 (L) 12/07/2019    HCT 27.2 (L) 12/07/2019     12/07/2019     12/07/2019    K 3.8 12/07/2019    K 3.9 12/07/2019     12/07/2019     12/07/2019    CO2 23 12/07/2019    CO2 22 (L) 12/07/2019    BUN 26 (H) 12/07/2019    BUN 26 (H) 12/07/2019    CREATININE 1.7 (H) 12/07/2019    CREATININE 1.7 (H) 12/07/2019    EGFRNONAA 32.3 (A) 12/07/2019    EGFRNONAA 32.3 (A) 12/07/2019    GLUCOSE 130 (H) 11/29/2011    CALCIUM 7.8 (L) 12/07/2019    CALCIUM 8.3 (L) 12/07/2019    PHOS 3.3 12/07/2019    MG 2.3 12/07/2019    ALBUMIN 3.1 (L) 12/07/2019    ALBUMIN 3.1 (L) 12/07/2019    AST 36 12/07/2019    AST 38 12/07/2019    ALT 33 12/07/2019    ALT 34 12/07/2019       Lab Results   Component Value Date    EXTWBC 6.2 12/07/2020    EXTWBC 6.2 12/07/2020    EXTSEGS 78.2  12/07/2020    EXTSEGS 78.2 12/07/2020    EXTPLATELETS 377 12/07/2020    EXTPLATELETS 377 12/07/2020    EXTHEMOGLOBI 10.1 12/07/2020    EXTHEMOGLOBI 10.1 12/07/2020    EXTHEMATOCRI 31.7 12/07/2020    EXTHEMATOCRI 31.7 12/07/2020    EXTCREATININ 1.66 12/07/2020    EXTCREATININ 1.66 12/07/2020    EXTSODIUM 132 12/07/2020    EXTSODIUM 132 12/07/2020    EXTPOTASSIUM 4.4 12/07/2020    EXTPOTASSIUM 4.4 12/07/2020    EXTBUN 30 12/07/2020    EXTBUN 30 12/07/2020    EXTCO2 23 12/07/2020    EXTCO2 23 12/07/2020    EXTCALCIUM 8.6 12/07/2020    EXTPHOSPHORU 3.6 04/22/2020    EXTGLUCOSE 94 12/07/2020    EXTALBUMIN 4.1 12/07/2020    EXTAST 14 12/07/2020    EXTALT 4 12/07/2020    EXTBILITOTAL 0.3 12/07/2020       Lab Results   Component Value Date    EXTTACROLVL 3.4 12/07/2020    EXTTACROLVL 3.7 12/07/2020    EXTPROTCRE 2.7 (H) 12/07/2020    EXTPROTEINUA 29 01/22/2020       Labs were reviewed with the patient.    Assessment/Plan:     No diagnosis found.    Ms. Lentz is a 60 y.o. female with:     # History of ESRD presumed secondary to  Liver disease  - s/p SLK transplant in 2011  - ARSEN in November 2019. Resolved partially  - Kidney biopsy 2019: MODERATE CHRONIC ALLOGRAFT NEPHROPATHY; 2) CHANGES SUGGESTIVE OF CHRONIC PYELONEPHRITIS/REFLUX NEPHROPATHY; 3)FINDINGS CONSISTENT WITH FOCAL ACUTE PYELONEPHRITIS  - Baseline 1.6-1.9  - Last Cr 1.66  - Significant proteinuria ~2. Will repeat UPCR. 24 urine protein     # Immunosuppression:    -prograf 1.5/1 mg   - MMF to 500 mg BID   - prednisone 5 mg daily       # HTN:   - BPs  well controlled at home  - continue with home blood pressure monitoring  - low salt and healthy life discussed with the patient      # Metabolic Bone Disease/Secondary Hyperparathyroidism:  -  Start Ergo 50k monthly    # Anemia of chronic disease:   - stable           Plan:  24 hours urine protein and Cr  in a month  Check Mg and PO4 with next lab   Ergocalciferol 65345 units monthly X 1 year  Dermatology follow up  annually for skin cancer screening          Isa Rasheed MD

## 2020-12-15 NOTE — LETTER
December 15, 2020        Melissa Garcias  [address]  [facility] [address]  Phone: [phone]  Fax: [phone]             [facility]  [address]  [facility] [address]  Phone: [phone]   Patient: Virginia Lentz   MR Number: [id number]   YOB: 1960   Date of Visit: 12/15/2020       Dear Dr. Melissa Garcias    Thank you for referring Virginia Lentz to me for evaluation. Attached you will find relevant portions of my assessment and plan of care.    If you have questions, please do not hesitate to call me. I look forward to following Virginia Lentz along with you.    Sincerely,    Ruben Ferrell MD    Enclosure    If you would like to receive this communication electronically, please contact [email or web address] or [phone] to request [employer] access.    [employer] is a tool which provides read-only access to select patient information with whom you have a relationship. Its easy to use and provides real time access to review your patients record including encounter summaries, notes, results, and demographic information.    If you feel you have received this communication in error or would no longer like to receive these types of communications, please e-mail [email or web address]

## 2020-12-15 NOTE — Clinical Note
Plan:  24 hours urine protein and Cr  in a month  Check Mg and PO4 with next lab   Ergocalciferol 77683 units monthly X 1 year  Dermatology follow up annually for skin cancer screening

## 2020-12-15 NOTE — PROGRESS NOTES
Subjective:       Patient ID: Virginia Lentz is a 60 y.o. female.    Chief Complaint: No chief complaint on file.  The patient location is: Home  The chief complaint leading to consultation is: Liver transplant follow up    Visit type: audiovisual    Face to Face time with patient: 20 minutes of total time spent on the encounter, which includes face to face time and non-face to face time preparing to see the patient (eg, review of tests), Obtaining and/or reviewing separately obtained history, Documenting clinical information in the electronic or other health record, Independently interpreting results (not separately reported) and communicating results to the patient/family/caregiver, or Care coordination (not separately reported).     Each patient to whom he or she provides medical services by telemedicine is:  (1) informed of the relationship between the physician and patient and the respective role of any other health care provider with respect to management of the patient; and (2) notified that he or she may decline to receive medical services by telemedicine and may withdraw from such care at any time.    Notes:     HPI  I saw this 60 yr old  lady in the liver transplant follow up clinic. 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    She is now 9 years post transplant and is doing very well.  She had an episode of recidivism but is off alcohol now and is very positive about her long term outlook.    She had an episode of acute kidney rejection early on post op but her LFTs have generally been normal. She is on immunosuppression with Tacrolimus, MMF and prednisone (Pred 5 mg, MMF 750mg BID,Tac 1.5mg BID).    She is well and does not have any symptoms related to her abdomen or liver.    - imparled kidney function and had a kidney biopsy in 11/2019   KIDNEY (PERCUTANEOUS TRANSPLANT BIOPSY): SMALL SAMPLE, 1)  MODERATE CHRONIC   ALLOGRAFT NEPHROPATHY; 2) CHANGES SUGGESTIVE OF CHRONIC PYELONEPHRITIS/REFLUX   NEPHROPATHY; 3) FINDINGS CONSISTENT WITH FOCAL ACUTE PYELONEPHRITIS (SEE   COMMENT).     Lab Results   Component Value Date    ALT 33 2019    ALT 34 2019    AST 36 2019    AST 38 2019    GGT 73 (H) 10/20/2014    ALKPHOS 113 2019    ALKPHOS 110 2019    BILITOT 0.3 2019    BILITOT 0.3 2019     Past Medical History:   Diagnosis Date    ARSEN (acute kidney injury) 2019    Alcoholic cirrhosis 7/10/2012    CKD (chronic kidney disease) stage 3, GFR 30-59 ml/min     -donor kidney transplant 7/10/2012    Essential hypertension 10/22/2018    H/O recurrent pneumonia 10/22/2018    Hypocitraturic calcium nephrolithiasis 2013 24 urine showed low urine citrate.    Hypothyroidism 7/10/2012    Liver replaced by transplant 7/10/2012    Need for prophylactic immunotherapy 7/10/2012    Pancreatitis  Aug 2011    Pulmonary embolism 10/22/2018    Recurrent nephrolithiasis 7/10/2012    Recurrent pneumonia     last episode 2018     Past Surgical History:   Procedure Laterality Date    APPENDECTOMY       SECTION      KIDNEY TRANSPLANT      LIVER TRANSPLANT      RENAL BIOPSY N/A 2019    Procedure: BIOPSY, KIDNEY;  Surgeon: [facility] Diagnostic Provider;  Location: [facility] OR [address] [place] [address];  Service: Anesthesiology;  Laterality: N/A;  pt instructed to report to 2nd floor @ 10:30; Report to DOSC @12:00 PM, biopsy @ 2:00 PM    TONSILLECTOMY      TRACHEOSTOMY CLOSURE  Mar 2012    TRACHEOSTOMY TUBE PLACEMENT       Current Outpatient Medications   Medication Sig    acetylcysteine 200 mg/ml, 20%, (MUCOMYST) 200 mg/mL (20 %) nebulizer solution 2 (two) times daily.    albuterol-ipratropium (DUO-NEB) 2.5 mg-0.5 mg/3 mL nebulizer solution 3 (three) times daily as needed.    alprazolam (XANAX) 0.25 MG tablet Take 0.5 mg by mouth Twice daily.     amLODIPine  (NORVASC) 5 MG tablet Take 1 tablet (5 mg total) by mouth once daily.    aspirin (ECOTRIN) 81 MG EC tablet Take 81 mg by mouth Daily. 1 Tablet, Delayed Release (E.C.) Oral Every day.      biotin 500 mcg Cap Take 1 mg by mouth Daily. 1  Oral Every day    ergocalciferol (ERGOCALCIFEROL) 50,000 unit Cap Take 50,000 Units by mouth as needed (every 7 days as needed as directed by MD). Currently being held - Patient instructed by primary care physician to take when vitamin D deficiency based on lab results    FLUoxetine 20 MG capsule Take 1 capsule by mouth once daily.    gabapentin (NEURONTIN) 300 MG capsule Take 300 mg by mouth daily as needed. Patient currently not taking with last admin ~4 weeks ago. Would like to keep on board in case of severe nerve pain.    ibandronate (BONIVA) 150 mg tablet Take 150 mg by mouth every 30 days. Usually takes each month around the 15th    ipratropium-albuterol (COMBIVENT RESPIMAT)  mcg/actuation inhaler 3 (three) times daily as needed.    levothyroxine (SYNTHROID) 25 MCG tablet Take 25 mcg by mouth once daily.    loperamide (IMODIUM) 2 mg capsule Take 2 mg by mouth daily as needed. 1 Capsule Oral     magnesium oxide (MAG-OX) 400 mg tablet Take 3 tablets by mouth 2 (two) times daily.     multivitamin (THERAGRAN) per tablet Take by mouth Daily. 1 Tablet Oral Every day.  Over-the-counter Multivitamin    mycophenolate (CELLCEPT) 250 mg Cap TAKE 3 CAPSULES TWICE DAILY    NEXIUM 40 mg capsule Take 40 mg by mouth 2 (two) times daily.     nitrofurantoin, macrocrystal-monohydrate, (MACROBID) 100 MG capsule Take 1 capsule (100 mg total) by mouth 2 (two) times daily. For 5 days. Please discuss with Dr. Rasheed before luz elena    ondansetron (ZOFRAN ODT) 8 MG TbDL Take 8 mg by mouth Use as needed. 1 Tablet, Rapid Dissolve Oral Every 12 hours    predniSONE (DELTASONE) 10 MG tablet Take 5 mg by mouth Daily. Half Tablet Oral as directed    tacrolimus (PROGRAF) 0.5 MG Cap Take 1  capsule (0.5 mg total) by mouth once daily. Total dose = 1.5 mg in AM    tacrolimus (PROGRAF) 1 MG Cap Take 1 capsule (1 mg total) by mouth every 12 (twelve) hours. Total dose =1.5 mg in AM and 1 mg in PM.    valACYclovir (VALTREX) 1000 MG tablet Take 1 tablet (1,000 mg total) by mouth once daily.    zolpidem (AMBIEN) 5 MG Tab Take 5 mg by mouth every evening. 1 Tablet Oral At bedtime     No current facility-administered medications for this visit.        Review of Systems   Constitutional: Negative for activity change, appetite change, chills, fatigue, fever and unexpected weight change.   HENT: Negative for ear pain, hearing loss, mouth sores, nosebleeds, sore throat and trouble swallowing.    Eyes: Negative for pain, redness and visual disturbance.   Respiratory: Negative for cough, chest tightness, shortness of breath and wheezing.    Cardiovascular: Negative for chest pain, palpitations and leg swelling.   Gastrointestinal: Negative for abdominal distention, abdominal pain, blood in stool, constipation, diarrhea, nausea and vomiting.   Genitourinary: Negative for decreased urine volume, difficulty urinating, dysuria, frequency, hematuria, menstrual problem and urgency.   Musculoskeletal: Negative for arthralgias, back pain, gait problem, joint swelling and myalgias.   Skin: Negative for pallor, rash and wound.   Neurological: Negative for dizziness, tremors, seizures, syncope, speech difficulty, weakness, light-headedness, numbness and headaches.   Hematological: Negative for adenopathy. Does not bruise/bleed easily.   Psychiatric/Behavioral: Negative for confusion, decreased concentration, dysphoric mood and sleep disturbance. The patient is not nervous/anxious.          Objective:      Physical Exam   Constitutional: She appears well-developed and well-nourished. No distress.   HENT:   Head: Normocephalic.   Eyes: Pupils are equal, round, and reactive to light.   Neck: No JVD present. No tracheal deviation  present. No thyromegaly present.   Cardiovascular: Normal rate, regular rhythm and normal heart sounds.   No murmur heard.  Pulmonary/Chest: Effort normal and breath sounds normal. No stridor.   Abdominal: Soft.   Lymphadenopathy:        Head (right side): No submental, no submandibular, no tonsillar, no preauricular, no posterior auricular and no occipital adenopathy present.        Head (left side): No submental, no submandibular, no tonsillar, no preauricular, no posterior auricular and no occipital adenopathy present.     She has no cervical adenopathy.     She has no axillary adenopathy.   Neurological: She is alert. She has normal strength. She is not disoriented. No cranial nerve deficit or sensory deficit.   Skin: Skin is intact. No cyanosis.         Assessment:       1. Status post liver transplantation    2. Immunosuppression    3. -donor kidney transplant        Plan:   Overall well.    - no change in her immunosuppression in view of her early kidney rejection.    - BP normally monitored by PCP- was up lately- advised to see her PCP  - due for colonoscopy- she will arrange locally    Clinic in 1 year.

## 2020-12-15 NOTE — LETTER
December 15, 2020        Melissa Garcias  [address]  [facility] [address]  Phone: [phone]  Fax: [phone]             [facility] Transplant 1st Fl  [address]  [facility] [address]  Phone: [phone]   Patient: Virginia Lentz   MR Number: [id number]   YOB: 1960   Date of Visit: 12/15/2020       Dear Dr. Melissa Garcias    Thank you for referring Virginia Lentz to me for evaluation. Attached you will find relevant portions of my assessment and plan of care.    If you have questions, please do not hesitate to call me. I look forward to following Virginia Lentz along with you.    Sincerely,    Isa Rasheed MD    Enclosure    If you would like to receive this communication electronically, please contact [email or web address] or [phone] to request [employer] access.    [employer] is a tool which provides read-only access to select patient information with whom you have a relationship. Its easy to use and provides real time access to review your patients record including encounter summaries, notes, results, and demographic information.    If you feel you have received this communication in error or would no longer like to receive these types of communications, please e-mail [email or web address]

## 2020-12-16 ENCOUNTER — TELEPHONE (OUTPATIENT)
Dept: TRANSPLANT | Facility: CLINIC | Age: 60
End: 2020-12-16

## 2020-12-16 RX ORDER — ERGOCALCIFEROL 1.25 MG/1
50000 CAPSULE ORAL
Qty: 3 CAPSULE | Refills: 3 | Status: SHIPPED | OUTPATIENT
Start: 2020-12-16 | End: 2021-01-14 | Stop reason: SDUPTHER

## 2020-12-16 NOTE — TELEPHONE ENCOUNTER
Order previously sent, pt is aware.   ----- Message from Elyse Abrams sent at 12/16/2020 12:11 PM CST -----    ----- Message -----  From: Lorin Retana MD  Sent: 12/14/2020   5:08 PM CST  To: [facility] Post-Kidney Transplant Clinical    Results reviewed.  She is spilling more protein than 10 months ago, but she was almost at 2 g than.  This suggests some chronic kidney injury.  I recommend a 24 hr urine for protein and creatinine.

## 2020-12-16 NOTE — TELEPHONE ENCOUNTER
Letter and My chart message sent to notify pt of stable labs.  Next labs 3/8/21.  ----- Message from Ruben Ferrell MD sent at 12/15/2020  3:40 PM CST -----  Results reviewed

## 2020-12-16 NOTE — TELEPHONE ENCOUNTER
Companion Canine message and lab orders emailed to pt.  ----- Message from Isa Rasheed MD sent at 12/15/2020  2:59 PM CST -----  Plan:24 hours urine protein and Cr  in a monthCheck Mg and PO4 with next lab Ergocalciferol 72792 units monthly X 1 yearDermatology follow up annually for skin cancer screening

## 2020-12-18 ENCOUNTER — LAB VISIT (OUTPATIENT)
Dept: LAB | Facility: HOSPITAL | Age: 60
End: 2020-12-18
Payer: COMMERCIAL

## 2020-12-18 DIAGNOSIS — Z94.0 KIDNEY REPLACED BY TRANSPLANT: ICD-10-CM

## 2020-12-23 LAB
CLASS I ANTIBODY COMMENTS - LUMINEX: NORMAL
CLASS II ANTIBODY COMMENTS - LUMINEX: NORMAL
DSA1 TESTING DATE: NORMAL
DSA12 TESTING DATE: NORMAL
DSA2 TESTING DATE: NORMAL
SERUM COLLECTION DT - LUMINEX CLASS I: NORMAL
SERUM COLLECTION DT - LUMINEX CLASS II: NORMAL

## 2021-01-03 ENCOUNTER — PATIENT MESSAGE (OUTPATIENT)
Dept: TRANSPLANT | Facility: CLINIC | Age: 61
End: 2021-01-03

## 2021-01-08 ENCOUNTER — PATIENT MESSAGE (OUTPATIENT)
Dept: TRANSPLANT | Facility: CLINIC | Age: 61
End: 2021-01-08

## 2021-01-14 ENCOUNTER — PATIENT MESSAGE (OUTPATIENT)
Dept: TRANSPLANT | Facility: CLINIC | Age: 61
End: 2021-01-14

## 2021-01-14 DIAGNOSIS — Z94.0 KIDNEY TRANSPLANT RECIPIENT: ICD-10-CM

## 2021-01-15 RX ORDER — ERGOCALCIFEROL 1.25 MG/1
50000 CAPSULE ORAL
Qty: 3 CAPSULE | Refills: 3 | Status: SHIPPED | OUTPATIENT
Start: 2021-01-15

## 2021-02-19 ENCOUNTER — TELEPHONE (OUTPATIENT)
Dept: TRANSPLANT | Facility: CLINIC | Age: 61
End: 2021-02-19

## 2021-03-11 ENCOUNTER — TELEPHONE (OUTPATIENT)
Dept: TRANSPLANT | Facility: CLINIC | Age: 61
End: 2021-03-11

## 2021-03-18 ENCOUNTER — TELEPHONE (OUTPATIENT)
Dept: TRANSPLANT | Facility: CLINIC | Age: 61
End: 2021-03-18

## 2021-03-30 ENCOUNTER — TELEPHONE (OUTPATIENT)
Dept: TRANSPLANT | Facility: CLINIC | Age: 61
End: 2021-03-30

## 2021-03-30 LAB
EXT ALBUMIN: 3.6
EXT ALKALINE PHOSPHATASE: 76
EXT ALT: 4
EXT AST: 18
EXT BILIRUBIN TOTAL: 0.1
EXT BUN: 33
EXT CALCIUM: 8.4
EXT CHLORIDE: 101
EXT CO2: 19
EXT CREATININE: 2 MG/DL
EXT EOSINOPHIL%: 0.4
EXT GFR MDRD NON AF AMER: 25.5
EXT GLUCOSE: 113
EXT HEMATOCRIT: 30.5
EXT HEMOGLOBIN: 9.8
EXT LYMPH%: 9.8
EXT MONOCYTES%: 5.3
EXT PLATELETS: 309
EXT POTASSIUM: 4.8
EXT PROTEIN TOTAL: 6.8
EXT SEGS%: 83.8
EXT SODIUM: 131 MMOL/L
EXT TACROLIMUS LVL: 5.7
EXT WBC: 5.6

## 2021-03-31 ENCOUNTER — PATIENT MESSAGE (OUTPATIENT)
Dept: TRANSPLANT | Facility: CLINIC | Age: 61
End: 2021-03-31

## 2021-04-07 ENCOUNTER — TELEPHONE (OUTPATIENT)
Dept: TRANSPLANT | Facility: CLINIC | Age: 61
End: 2021-04-07

## 2021-04-15 ENCOUNTER — TELEPHONE (OUTPATIENT)
Dept: TRANSPLANT | Facility: CLINIC | Age: 61
End: 2021-04-15

## 2021-04-15 DIAGNOSIS — Z94.0 KIDNEY REPLACED BY TRANSPLANT: Primary | ICD-10-CM

## 2021-04-16 ENCOUNTER — TELEPHONE (OUTPATIENT)
Dept: TRANSPLANT | Facility: CLINIC | Age: 61
End: 2021-04-16

## 2021-04-16 ENCOUNTER — PATIENT MESSAGE (OUTPATIENT)
Dept: TRANSPLANT | Facility: CLINIC | Age: 61
End: 2021-04-16

## 2021-04-16 DIAGNOSIS — Z94.0 KIDNEY TRANSPLANT RECIPIENT: Primary | ICD-10-CM

## 2021-04-16 DIAGNOSIS — Z94.4 LIVER REPLACED BY TRANSPLANT: Primary | ICD-10-CM

## 2021-04-16 DIAGNOSIS — Z94.0 KIDNEY TRANSPLANT RECIPIENT: ICD-10-CM

## 2021-04-19 ENCOUNTER — TELEPHONE (OUTPATIENT)
Dept: INTERVENTIONAL RADIOLOGY/VASCULAR | Facility: HOSPITAL | Age: 61
End: 2021-04-19

## 2021-04-19 ENCOUNTER — TELEPHONE (OUTPATIENT)
Dept: TRANSPLANT | Facility: CLINIC | Age: 61
End: 2021-04-19

## 2021-04-20 ENCOUNTER — TELEPHONE (OUTPATIENT)
Dept: TRANSPLANT | Facility: CLINIC | Age: 61
End: 2021-04-20

## 2021-04-21 ENCOUNTER — TELEPHONE (OUTPATIENT)
Dept: TRANSPLANT | Facility: CLINIC | Age: 61
End: 2021-04-21

## 2021-04-21 ENCOUNTER — PATIENT MESSAGE (OUTPATIENT)
Dept: TRANSPLANT | Facility: CLINIC | Age: 61
End: 2021-04-21

## 2021-04-21 LAB
EXT ALBUMIN: 3.9
EXT ALKALINE PHOSPHATASE: 71
EXT ALT: <4
EXT AST: 15
EXT BILIRUBIN TOTAL: 0.3
EXT BUN: 38
EXT BUN: 45
EXT CALCIUM: 8.8
EXT CALCIUM: 9
EXT CHLORIDE: 106
EXT CHLORIDE: 99
EXT CO2: 21
EXT CO2: 22
EXT CREATININE: 2.14 MG/DL
EXT CREATININE: 2.38 MG/DL
EXT GFR MDRD NON AF AMER: 24.8
EXT GLUCOSE: 107
EXT GLUCOSE: 85
EXT HEMATOCRIT: 34.2
EXT HEMOGLOBIN: 11.3
EXT MAGNESIUM: 1.4
EXT PLATELETS: 299
EXT POTASSIUM: 4.2
EXT POTASSIUM: 4.6
EXT PROTEIN TOTAL: 6.8
EXT PROTEIN UA: 60
EXT SODIUM: 135 MMOL/L
EXT SODIUM: 135 MMOL/L
EXT TACROLIMUS LVL: 4.6
EXT WBC: 5.9

## 2021-04-22 ENCOUNTER — TELEPHONE (OUTPATIENT)
Dept: INTERVENTIONAL RADIOLOGY/VASCULAR | Facility: HOSPITAL | Age: 61
End: 2021-04-22

## 2021-04-22 ENCOUNTER — TELEPHONE (OUTPATIENT)
Dept: TRANSPLANT | Facility: CLINIC | Age: 61
End: 2021-04-22

## 2021-04-23 ENCOUNTER — TELEPHONE (OUTPATIENT)
Dept: TRANSPLANT | Facility: CLINIC | Age: 61
End: 2021-04-23

## 2021-04-23 ENCOUNTER — PATIENT MESSAGE (OUTPATIENT)
Dept: TRANSPLANT | Facility: CLINIC | Age: 61
End: 2021-04-23

## 2021-04-26 ENCOUNTER — TELEPHONE (OUTPATIENT)
Dept: TRANSPLANT | Facility: CLINIC | Age: 61
End: 2021-04-26

## 2021-04-26 ENCOUNTER — PATIENT MESSAGE (OUTPATIENT)
Dept: TRANSPLANT | Facility: CLINIC | Age: 61
End: 2021-04-26

## 2021-04-26 LAB
EXT BASOPHIL%: 0.6
EXT CHLORIDE: 103
EXT CO2: 24
EXT CREATININE UA: 17.4
EXT CREATININE: 2.16 MG/DL
EXT EOSINOPHIL%: 1.6
EXT GFR MDRD NON AF AMER: 24.6
EXT GLUCOSE: 93
EXT HEMATOCRIT: 34.4
EXT HEMOGLOBIN: 11.3
EXT LYMPH%: 12.7
EXT MAGNESIUM: 1.3
EXT MONOCYTES%: 9.5
EXT PLATELETS: 296
EXT POTASSIUM: 4.1
EXT PROT/CREAT RATIO UR: 2.9
EXT PROTEIN UA: 60
EXT SEGS%: 75.6
EXT SODIUM: 135 MMOL/L
EXT WBC: 7

## 2021-04-29 DIAGNOSIS — N28.9 KIDNEY DYSFUNCTION: Primary | ICD-10-CM

## 2021-04-29 RX ORDER — FENTANYL CITRATE 50 UG/ML
50 INJECTION, SOLUTION INTRAMUSCULAR; INTRAVENOUS
Status: CANCELLED | OUTPATIENT
Start: 2021-04-29

## 2021-04-29 RX ORDER — MIDAZOLAM HYDROCHLORIDE 1 MG/ML
1 INJECTION INTRAMUSCULAR; INTRAVENOUS
Status: CANCELLED | OUTPATIENT
Start: 2021-04-29

## 2021-04-30 ENCOUNTER — TELEPHONE (OUTPATIENT)
Dept: TRANSPLANT | Facility: CLINIC | Age: 61
End: 2021-04-30

## 2021-04-30 ENCOUNTER — PATIENT MESSAGE (OUTPATIENT)
Dept: TRANSPLANT | Facility: CLINIC | Age: 61
End: 2021-04-30

## 2021-05-03 ENCOUNTER — TELEPHONE (OUTPATIENT)
Dept: INTERVENTIONAL RADIOLOGY/VASCULAR | Facility: CLINIC | Age: 61
End: 2021-05-03

## 2021-05-05 ENCOUNTER — PATIENT MESSAGE (OUTPATIENT)
Dept: TRANSPLANT | Facility: CLINIC | Age: 61
End: 2021-05-05

## 2021-05-06 ENCOUNTER — TELEPHONE (OUTPATIENT)
Dept: TRANSPLANT | Facility: CLINIC | Age: 61
End: 2021-05-06

## 2021-05-06 LAB
EXT BACTERIA UA: NORMAL
EXT BASOPHIL%: 0.6
EXT BUN: 41
EXT CALCIUM: 9.4
EXT CHLORIDE: 100
EXT CO2: 19
EXT CREATININE: 2.37 MG/DL
EXT EOSINOPHIL%: 0.2
EXT GFR MDRD NON AF AMER: 22.1
EXT GLUCOSE: 103
EXT HEMATOCRIT: 36.3
EXT HEMOGLOBIN: 12
EXT INR: 0.9
EXT LYMPH%: 10.1
EXT MONOCYTES%: 7
EXT PLATELETS: 380
EXT POTASSIUM: 3.7
EXT PROT/CREAT RATIO UR: 2.7
EXT PROTEIN UA: 148
EXT PT: 11.9
EXT PTT: 33.5
EXT RBC UA: NORMAL
EXT SEGS%: 82.1
EXT SODIUM: 133 MMOL/L
EXT URINE APPEARANCE: CLEAR
EXT URINE COLOR: YELLOW
EXT URINE NITRITIE: NORMAL
EXT URINE PH: 5
EXT URINE PROTEIN: NORMAL
EXT URINE SP GRAVITY: 1.01
EXT WBC UA: NORMAL
EXT WBC: 8.2

## 2021-05-07 ENCOUNTER — HOSPITAL ENCOUNTER (OUTPATIENT)
Dept: INTERVENTIONAL RADIOLOGY/VASCULAR | Facility: HOSPITAL | Age: 61
Discharge: HOME | End: 2021-05-07
Attending: INTERNAL MEDICINE
Payer: COMMERCIAL

## 2021-05-07 ENCOUNTER — OFFICE VISIT (OUTPATIENT)
Dept: TRANSPLANT | Facility: CLINIC | Age: 61
End: 2021-05-07
Payer: COMMERCIAL

## 2021-05-07 ENCOUNTER — HOSPITAL ENCOUNTER (OUTPATIENT)
Dept: RADIOLOGY | Facility: HOSPITAL | Age: 61
Discharge: HOME | End: 2021-05-07
Attending: INTERNAL MEDICINE
Payer: COMMERCIAL

## 2021-05-07 ENCOUNTER — TELEPHONE (OUTPATIENT)
Dept: TRANSPLANT | Facility: CLINIC | Age: 61
End: 2021-05-07

## 2021-05-07 VITALS
OXYGEN SATURATION: 93 % | HEIGHT: 60 IN | WEIGHT: 112.88 LBS | DIASTOLIC BLOOD PRESSURE: 71 MMHG | BODY MASS INDEX: 22.16 KG/M2 | HEART RATE: 89 BPM | RESPIRATION RATE: 18 BRPM | SYSTOLIC BLOOD PRESSURE: 134 MMHG

## 2021-05-07 VITALS
HEART RATE: 89 BPM | DIASTOLIC BLOOD PRESSURE: 71 MMHG | RESPIRATION RATE: 18 BRPM | SYSTOLIC BLOOD PRESSURE: 134 MMHG | WEIGHT: 112.88 LBS | BODY MASS INDEX: 22.16 KG/M2 | OXYGEN SATURATION: 93 % | HEIGHT: 60 IN | TEMPERATURE: 98 F

## 2021-05-07 VITALS
BODY MASS INDEX: 22.16 KG/M2 | HEART RATE: 69 BPM | RESPIRATION RATE: 16 BRPM | SYSTOLIC BLOOD PRESSURE: 140 MMHG | OXYGEN SATURATION: 96 % | DIASTOLIC BLOOD PRESSURE: 66 MMHG | WEIGHT: 112.88 LBS | HEIGHT: 60 IN | TEMPERATURE: 98 F

## 2021-05-07 DIAGNOSIS — Z94.0 KIDNEY REPLACED BY TRANSPLANT: ICD-10-CM

## 2021-05-07 DIAGNOSIS — Z94.0 DECEASED-DONOR KIDNEY TRANSPLANT: ICD-10-CM

## 2021-05-07 DIAGNOSIS — N18.32 STAGE 3B CHRONIC KIDNEY DISEASE: ICD-10-CM

## 2021-05-07 DIAGNOSIS — N18.32 STAGE 3B CHRONIC KIDNEY DISEASE: Chronic | ICD-10-CM

## 2021-05-07 DIAGNOSIS — Z94.0 KIDNEY REPLACED BY TRANSPLANT: Primary | ICD-10-CM

## 2021-05-07 DIAGNOSIS — I10 ESSENTIAL HYPERTENSION: ICD-10-CM

## 2021-05-07 DIAGNOSIS — Z94.4 STATUS POST LIVER TRANSPLANTATION: ICD-10-CM

## 2021-05-07 DIAGNOSIS — N17.9 AKI (ACUTE KIDNEY INJURY): ICD-10-CM

## 2021-05-07 DIAGNOSIS — D84.9 IMMUNOSUPPRESSION: ICD-10-CM

## 2021-05-07 DIAGNOSIS — Z94.0 KIDNEY TRANSPLANT RECIPIENT: Primary | ICD-10-CM

## 2021-05-07 DIAGNOSIS — Z94.4 STATUS POST LIVER TRANSPLANTATION: Chronic | ICD-10-CM

## 2021-05-07 DIAGNOSIS — D84.9 IMMUNOSUPPRESSION: Chronic | ICD-10-CM

## 2021-05-07 DIAGNOSIS — Z94.0 DECEASED-DONOR KIDNEY TRANSPLANT: Primary | Chronic | ICD-10-CM

## 2021-05-07 LAB
ALBUMIN SERPL BCP-MCNC: 4.1 G/DL (ref 3.5–5.2)
ALP SERPL-CCNC: 298 U/L (ref 55–135)
ALT SERPL W/O P-5'-P-CCNC: 55 U/L (ref 10–44)
ANION GAP SERPL CALC-SCNC: 12 MMOL/L (ref 8–16)
AST SERPL-CCNC: 42 U/L (ref 10–40)
BASOPHILS # BLD AUTO: 0.04 K/UL (ref 0–0.2)
BASOPHILS NFR BLD: 0.4 % (ref 0–1.9)
BILIRUB SERPL-MCNC: 0.5 MG/DL (ref 0.1–1)
BUN SERPL-MCNC: 38 MG/DL (ref 8–23)
CALCIUM SERPL-MCNC: 9.8 MG/DL (ref 8.7–10.5)
CHLORIDE SERPL-SCNC: 97 MMOL/L (ref 95–110)
CO2 SERPL-SCNC: 23 MMOL/L (ref 23–29)
CREAT SERPL-MCNC: 2.6 MG/DL (ref 0.5–1.4)
DIFFERENTIAL METHOD: ABNORMAL
EOSINOPHIL # BLD AUTO: 0.1 K/UL (ref 0–0.5)
EOSINOPHIL NFR BLD: 0.5 % (ref 0–8)
ERYTHROCYTE [DISTWIDTH] IN BLOOD BY AUTOMATED COUNT: 15.1 % (ref 11.5–14.5)
EST. GFR  (AFRICAN AMERICAN): 22 ML/MIN/1.73 M^2
EST. GFR  (NON AFRICAN AMERICAN): 19.1 ML/MIN/1.73 M^2
GLUCOSE SERPL-MCNC: 126 MG/DL (ref 70–110)
HCT VFR BLD AUTO: 35.5 % (ref 37–48.5)
HGB BLD-MCNC: 11.7 G/DL (ref 12–16)
IMM GRANULOCYTES # BLD AUTO: 0.07 K/UL (ref 0–0.04)
IMM GRANULOCYTES NFR BLD AUTO: 0.7 % (ref 0–0.5)
INR PPP: 0.9 (ref 0.8–1.2)
LYMPHOCYTES # BLD AUTO: 0.8 K/UL (ref 1–4.8)
LYMPHOCYTES NFR BLD: 7.7 % (ref 18–48)
MCH RBC QN AUTO: 31.5 PG (ref 27–31)
MCHC RBC AUTO-ENTMCNC: 33 G/DL (ref 32–36)
MCV RBC AUTO: 96 FL (ref 82–98)
MONOCYTES # BLD AUTO: 0.3 K/UL (ref 0.3–1)
MONOCYTES NFR BLD: 2.9 % (ref 4–15)
NEUTROPHILS # BLD AUTO: 9.3 K/UL (ref 1.8–7.7)
NEUTROPHILS NFR BLD: 87.8 % (ref 38–73)
NRBC BLD-RTO: 0 /100 WBC
PLATELET # BLD AUTO: 388 K/UL (ref 150–450)
PMV BLD AUTO: 9.2 FL (ref 9.2–12.9)
POTASSIUM SERPL-SCNC: 3.9 MMOL/L (ref 3.5–5.1)
PROT SERPL-MCNC: 8.4 G/DL (ref 6–8.4)
PROTHROMBIN TIME: 9.9 SEC (ref 9–12.5)
RBC # BLD AUTO: 3.71 M/UL (ref 4–5.4)
SODIUM SERPL-SCNC: 132 MMOL/L (ref 136–145)
WBC # BLD AUTO: 10.57 K/UL (ref 3.9–12.7)

## 2021-05-07 PROCEDURE — 3078F DIAST BP <80 MM HG: CPT | Mod: CPTII,S$GLB,, | Performed by: INTERNAL MEDICINE

## 2021-05-07 PROCEDURE — 3075F SYST BP GE 130 - 139MM HG: CPT | Mod: CPTII,S$GLB,, | Performed by: INTERNAL MEDICINE

## 2021-05-07 PROCEDURE — 76942 ECHO GUIDE FOR BIOPSY: CPT | Mod: 26,,, | Performed by: RADIOLOGY

## 2021-05-07 PROCEDURE — 76942 ECHO GUIDE FOR BIOPSY: CPT | Mod: TC | Performed by: RADIOLOGY

## 2021-05-07 PROCEDURE — 50200 IR BIOPSY KIDNEY: ICD-10-PCS | Mod: LT,,, | Performed by: RADIOLOGY

## 2021-05-07 PROCEDURE — 1125F AMNT PAIN NOTED PAIN PRSNT: CPT | Mod: S$GLB,,, | Performed by: INTERNAL MEDICINE

## 2021-05-07 PROCEDURE — 50200 RENAL BIOPSY PERQ: CPT | Performed by: RADIOLOGY

## 2021-05-07 PROCEDURE — 1126F PR PAIN SEVERITY QUANTIFIED, NO PAIN PRESENT: ICD-10-PCS | Mod: S$GLB,,, | Performed by: INTERNAL MEDICINE

## 2021-05-07 PROCEDURE — 85610 PROTHROMBIN TIME: CPT | Performed by: PHYSICIAN ASSISTANT

## 2021-05-07 PROCEDURE — 99499 NO LOS: ICD-10-PCS | Mod: S$GLB,,, | Performed by: INTERNAL MEDICINE

## 2021-05-07 PROCEDURE — 63600175 PHARM REV CODE 636 W HCPCS: Performed by: RADIOLOGY

## 2021-05-07 PROCEDURE — 3008F BODY MASS INDEX DOCD: CPT | Mod: CPTII,S$GLB,, | Performed by: INTERNAL MEDICINE

## 2021-05-07 PROCEDURE — 25000003 PHARM REV CODE 250: Performed by: RADIOLOGY

## 2021-05-07 PROCEDURE — 3008F PR BODY MASS INDEX (BMI) DOCUMENTED: ICD-10-PCS | Mod: CPTII,S$GLB,, | Performed by: INTERNAL MEDICINE

## 2021-05-07 PROCEDURE — 71046 X-RAY EXAM CHEST 2 VIEWS: CPT | Mod: 26,,, | Performed by: RADIOLOGY

## 2021-05-07 PROCEDURE — 71046 XR CHEST PA AND LATERAL: ICD-10-PCS | Mod: 26,,, | Performed by: RADIOLOGY

## 2021-05-07 PROCEDURE — 1125F PR PAIN SEVERITY QUANTIFIED, PAIN PRESENT: ICD-10-PCS | Mod: S$GLB,,, | Performed by: INTERNAL MEDICINE

## 2021-05-07 PROCEDURE — 88313 SPECIAL STAINS GROUP 2: CPT | Performed by: PATHOLOGY

## 2021-05-07 PROCEDURE — 99152 MOD SED SAME PHYS/QHP 5/>YRS: CPT | Mod: ,,, | Performed by: RADIOLOGY

## 2021-05-07 PROCEDURE — 3078F PR MOST RECENT DIASTOLIC BLOOD PRESSURE < 80 MM HG: ICD-10-PCS | Mod: CPTII,S$GLB,, | Performed by: INTERNAL MEDICINE

## 2021-05-07 PROCEDURE — 80053 COMPREHEN METABOLIC PANEL: CPT | Performed by: PHYSICIAN ASSISTANT

## 2021-05-07 PROCEDURE — 63600175 PHARM REV CODE 636 W HCPCS: Performed by: STUDENT IN AN ORGANIZED HEALTH CARE EDUCATION/TRAINING PROGRAM

## 2021-05-07 PROCEDURE — 85025 COMPLETE CBC W/AUTO DIFF WBC: CPT | Performed by: PHYSICIAN ASSISTANT

## 2021-05-07 PROCEDURE — 3075F PR MOST RECENT SYSTOLIC BLOOD PRESS GE 130-139MM HG: ICD-10-PCS | Mod: CPTII,S$GLB,, | Performed by: INTERNAL MEDICINE

## 2021-05-07 PROCEDURE — 99215 PR OFFICE/OUTPT VISIT, EST, LEVL V, 40-54 MIN: ICD-10-PCS | Mod: S$GLB,,, | Performed by: INTERNAL MEDICINE

## 2021-05-07 PROCEDURE — 99999 PR PBB SHADOW E&M-EST. PATIENT-LVL III: ICD-10-PCS | Mod: PBBFAC,,, | Performed by: INTERNAL MEDICINE

## 2021-05-07 PROCEDURE — 88350 IMFLUOR EA ADDL 1ANTB STN PX: CPT | Performed by: PATHOLOGY

## 2021-05-07 PROCEDURE — 99999 PR PBB SHADOW E&M-EST. PATIENT-LVL III: CPT | Mod: PBBFAC,,, | Performed by: INTERNAL MEDICINE

## 2021-05-07 PROCEDURE — 88305 TISSUE EXAM BY PATHOLOGIST: CPT | Performed by: PATHOLOGY

## 2021-05-07 PROCEDURE — 71046 X-RAY EXAM CHEST 2 VIEWS: CPT | Mod: TC

## 2021-05-07 PROCEDURE — 99153 MOD SED SAME PHYS/QHP EA: CPT | Performed by: RADIOLOGY

## 2021-05-07 PROCEDURE — 99215 OFFICE O/P EST HI 40 MIN: CPT | Mod: S$GLB,,, | Performed by: INTERNAL MEDICINE

## 2021-05-07 PROCEDURE — 76942 PR U/S GUIDANCE FOR NEEDLE GUIDANCE: ICD-10-PCS | Mod: 26,,, | Performed by: RADIOLOGY

## 2021-05-07 PROCEDURE — 99499 UNLISTED E&M SERVICE: CPT | Mod: S$GLB,,, | Performed by: INTERNAL MEDICINE

## 2021-05-07 PROCEDURE — 99152 PR MOD CONSCIOUS SEDATION, SAME PHYS, 5+ YRS, FIRST 15 MIN: ICD-10-PCS | Mod: ,,, | Performed by: RADIOLOGY

## 2021-05-07 PROCEDURE — 1126F AMNT PAIN NOTED NONE PRSNT: CPT | Mod: S$GLB,,, | Performed by: INTERNAL MEDICINE

## 2021-05-07 PROCEDURE — 27201068 IR BIOPSY KIDNEY

## 2021-05-07 PROCEDURE — 99152 MOD SED SAME PHYS/QHP 5/>YRS: CPT | Performed by: RADIOLOGY

## 2021-05-07 PROCEDURE — 88346 IMFLUOR 1ST 1ANTB STAIN PX: CPT | Performed by: PATHOLOGY

## 2021-05-07 RX ORDER — HYDROMORPHONE HYDROCHLORIDE 1 MG/ML
0.5 INJECTION, SOLUTION INTRAMUSCULAR; INTRAVENOUS; SUBCUTANEOUS ONCE
Status: COMPLETED | OUTPATIENT
Start: 2021-05-07 | End: 2021-05-07

## 2021-05-07 RX ORDER — ACETYLCYSTEINE 200 MG/ML
4 SOLUTION ORAL; RESPIRATORY (INHALATION) 2 TIMES DAILY
Qty: 30 ML | Refills: 3 | Status: SHIPPED | OUTPATIENT
Start: 2021-05-07 | End: 2022-09-29

## 2021-05-07 RX ORDER — OXYCODONE HYDROCHLORIDE 5 MG/1
5 TABLET ORAL EVERY 4 HOURS PRN
Status: DISCONTINUED | OUTPATIENT
Start: 2021-05-07 | End: 2021-05-08 | Stop reason: HOSPADM

## 2021-05-07 RX ORDER — MIDAZOLAM HYDROCHLORIDE 1 MG/ML
INJECTION INTRAMUSCULAR; INTRAVENOUS CODE/TRAUMA/SEDATION MEDICATION
Status: COMPLETED | OUTPATIENT
Start: 2021-05-07 | End: 2021-05-07

## 2021-05-07 RX ORDER — ONDANSETRON 2 MG/ML
4 INJECTION INTRAMUSCULAR; INTRAVENOUS EVERY 6 HOURS PRN
Status: DISCONTINUED | OUTPATIENT
Start: 2021-05-07 | End: 2021-05-08 | Stop reason: HOSPADM

## 2021-05-07 RX ORDER — IPRATROPIUM BROMIDE AND ALBUTEROL SULFATE 2.5; .5 MG/3ML; MG/3ML
3 SOLUTION RESPIRATORY (INHALATION)
Qty: 1 BOX | Refills: 0 | Status: SHIPPED | OUTPATIENT
Start: 2021-05-07 | End: 2022-09-29

## 2021-05-07 RX ORDER — AMOXICILLIN AND CLAVULANATE POTASSIUM 500; 125 MG/1; MG/1
1 TABLET, FILM COATED ORAL 2 TIMES DAILY
Qty: 20 TABLET | Refills: 0 | Status: SHIPPED | OUTPATIENT
Start: 2021-05-07 | End: 2022-01-24

## 2021-05-07 RX ORDER — SODIUM CHLORIDE 9 MG/ML
INJECTION, SOLUTION INTRAVENOUS CONTINUOUS
Status: DISCONTINUED | OUTPATIENT
Start: 2021-05-07 | End: 2021-05-08 | Stop reason: HOSPADM

## 2021-05-07 RX ORDER — FENTANYL CITRATE 50 UG/ML
INJECTION, SOLUTION INTRAMUSCULAR; INTRAVENOUS CODE/TRAUMA/SEDATION MEDICATION
Status: COMPLETED | OUTPATIENT
Start: 2021-05-07 | End: 2021-05-07

## 2021-05-07 RX ADMIN — OXYCODONE HYDROCHLORIDE 5 MG: 5 TABLET ORAL at 09:05

## 2021-05-07 RX ADMIN — FENTANYL CITRATE 50 MCG: 50 INJECTION, SOLUTION INTRAMUSCULAR; INTRAVENOUS at 08:05

## 2021-05-07 RX ADMIN — MIDAZOLAM HYDROCHLORIDE 1 MG: 1 INJECTION, SOLUTION INTRAMUSCULAR; INTRAVENOUS at 08:05

## 2021-05-07 RX ADMIN — FENTANYL CITRATE 25 MCG: 50 INJECTION, SOLUTION INTRAMUSCULAR; INTRAVENOUS at 08:05

## 2021-05-07 RX ADMIN — Medication 1 G: at 08:05

## 2021-05-07 RX ADMIN — HYDROMORPHONE HYDROCHLORIDE 0.5 MG: 1 INJECTION, SOLUTION INTRAMUSCULAR; INTRAVENOUS; SUBCUTANEOUS at 10:05

## 2021-05-11 ENCOUNTER — TELEPHONE (OUTPATIENT)
Dept: TRANSPLANT | Facility: CLINIC | Age: 61
End: 2021-05-11

## 2021-05-12 ENCOUNTER — PATIENT MESSAGE (OUTPATIENT)
Dept: TRANSPLANT | Facility: CLINIC | Age: 61
End: 2021-05-12

## 2021-05-12 ENCOUNTER — TELEPHONE (OUTPATIENT)
Dept: TRANSPLANT | Facility: CLINIC | Age: 61
End: 2021-05-12

## 2021-05-12 LAB
FINAL PATHOLOGIC DIAGNOSIS: NORMAL
GROSS: NORMAL
Lab: NORMAL

## 2021-05-13 ENCOUNTER — PATIENT MESSAGE (OUTPATIENT)
Dept: TRANSPLANT | Facility: CLINIC | Age: 61
End: 2021-05-13

## 2021-05-13 ENCOUNTER — OFFICE VISIT (OUTPATIENT)
Dept: TRANSPLANT | Facility: CLINIC | Age: 61
End: 2021-05-13
Payer: COMMERCIAL

## 2021-05-13 DIAGNOSIS — E03.9 HYPOTHYROIDISM, UNSPECIFIED TYPE: Chronic | ICD-10-CM

## 2021-05-13 DIAGNOSIS — Z94.4 STATUS POST LIVER TRANSPLANTATION: Chronic | ICD-10-CM

## 2021-05-13 DIAGNOSIS — Z94.0 DECEASED-DONOR KIDNEY TRANSPLANT: Chronic | ICD-10-CM

## 2021-05-13 DIAGNOSIS — N18.32 STAGE 3B CHRONIC KIDNEY DISEASE: Chronic | ICD-10-CM

## 2021-05-13 DIAGNOSIS — Z94.0 KIDNEY REPLACED BY TRANSPLANT: Primary | ICD-10-CM

## 2021-05-13 PROBLEM — I26.99 PULMONARY EMBOLISM: Status: RESOLVED | Noted: 2018-10-22 | Resolved: 2021-05-13

## 2021-05-13 PROBLEM — Z87.01 H/O RECURRENT PNEUMONIA: Status: RESOLVED | Noted: 2018-10-22 | Resolved: 2021-05-13

## 2021-05-13 LAB
EXT ALBUMIN: 3.5
EXT ALKALINE PHOSPHATASE: 143
EXT ALT: 16
EXT AST: 14
EXT BASOPHIL%: 0.8
EXT BILIRUBIN TOTAL: 0.1
EXT BUN: 2.74
EXT CALCIUM: 7.7
EXT CHLORIDE: 93
EXT CO2: 20
EXT CREATININE: 2.74 MG/DL
EXT EOSINOPHIL%: 1.6
EXT GLUCOSE: 114
EXT HEMATOCRIT: 29.8
EXT HEMOGLOBIN: 9.8
EXT LYMPH%: 9.2
EXT MONOCYTES%: 6.6
EXT PLATELETS: 309
EXT POTASSIUM: 3.2
EXT PROTEIN TOTAL: 6.5
EXT SEGS%: 81.8
EXT SODIUM: 122 MMOL/L
EXT WBC: 5.1

## 2021-05-13 PROCEDURE — 99214 OFFICE O/P EST MOD 30 MIN: CPT | Mod: 95,,, | Performed by: INTERNAL MEDICINE

## 2021-05-13 PROCEDURE — 99214 PR OFFICE/OUTPT VISIT, EST, LEVL IV, 30-39 MIN: ICD-10-PCS | Mod: 95,,, | Performed by: INTERNAL MEDICINE

## 2021-05-13 RX ORDER — TACROLIMUS 0.5 MG/1
CAPSULE ORAL
Qty: 30 CAPSULE | Refills: 0 | Status: SHIPPED | OUTPATIENT
Start: 2021-05-13 | End: 2021-07-23

## 2021-05-19 ENCOUNTER — TELEPHONE (OUTPATIENT)
Dept: TRANSPLANT | Facility: CLINIC | Age: 61
End: 2021-05-19

## 2021-05-24 ENCOUNTER — TELEPHONE (OUTPATIENT)
Dept: TRANSPLANT | Facility: CLINIC | Age: 61
End: 2021-05-24

## 2021-05-24 ENCOUNTER — TELEPHONE (OUTPATIENT)
Dept: HEMATOLOGY/ONCOLOGY | Facility: CLINIC | Age: 61
End: 2021-05-24

## 2021-05-25 ENCOUNTER — PATIENT MESSAGE (OUTPATIENT)
Dept: TRANSPLANT | Facility: CLINIC | Age: 61
End: 2021-05-25

## 2021-05-26 ENCOUNTER — PATIENT MESSAGE (OUTPATIENT)
Dept: TRANSPLANT | Facility: CLINIC | Age: 61
End: 2021-05-26

## 2021-05-26 ENCOUNTER — TELEPHONE (OUTPATIENT)
Dept: TRANSPLANT | Facility: CLINIC | Age: 61
End: 2021-05-26

## 2021-06-04 ENCOUNTER — TELEPHONE (OUTPATIENT)
Dept: TRANSPLANT | Facility: CLINIC | Age: 61
End: 2021-06-04

## 2021-07-06 ENCOUNTER — TELEPHONE (OUTPATIENT)
Dept: TRANSPLANT | Facility: CLINIC | Age: 61
End: 2021-07-06

## 2021-07-12 ENCOUNTER — PATIENT MESSAGE (OUTPATIENT)
Dept: TRANSPLANT | Facility: CLINIC | Age: 61
End: 2021-07-12

## 2021-07-15 ENCOUNTER — TELEPHONE (OUTPATIENT)
Dept: TRANSPLANT | Facility: CLINIC | Age: 61
End: 2021-07-15

## 2021-07-20 ENCOUNTER — PATIENT MESSAGE (OUTPATIENT)
Dept: TRANSPLANT | Facility: CLINIC | Age: 61
End: 2021-07-20

## 2021-07-23 ENCOUNTER — PATIENT MESSAGE (OUTPATIENT)
Dept: TRANSPLANT | Facility: CLINIC | Age: 61
End: 2021-07-23

## 2021-07-23 ENCOUNTER — TELEPHONE (OUTPATIENT)
Dept: TRANSPLANT | Facility: CLINIC | Age: 61
End: 2021-07-23

## 2021-07-23 LAB
EXT ALBUMIN: 3.5
EXT ALKALINE PHOSPHATASE: 72
EXT ALT: 4
EXT AST: 16
EXT BASOPHIL%: 1
EXT BILIRUBIN TOTAL: 0.2
EXT BUN: 30
EXT CALCIUM: 8.5
EXT CHLORIDE: 101
EXT CO2: 28
EXT CREATININE: 2.37 MG/DL
EXT EOSINOPHIL%: 0.6
EXT GLUCOSE: 92
EXT HEMATOCRIT: 28.5
EXT HEMOGLOBIN: 9.4
EXT LYMPH%: 20.2
EXT MONOCYTES%: 8.1
EXT PLATELETS: 374
EXT POTASSIUM: 4.6
EXT PROTEIN TOTAL: 6.3
EXT SEGS%: 70.1
EXT SODIUM: 131 MMOL/L
EXT TACROLIMUS LVL: 5.7
EXT WBC: 6.4

## 2021-08-03 ENCOUNTER — TELEPHONE (OUTPATIENT)
Dept: TRANSPLANT | Facility: CLINIC | Age: 61
End: 2021-08-03

## 2021-08-07 ENCOUNTER — PATIENT MESSAGE (OUTPATIENT)
Dept: TRANSPLANT | Facility: CLINIC | Age: 61
End: 2021-08-07

## 2021-08-07 ENCOUNTER — NURSE TRIAGE (OUTPATIENT)
Dept: ADMINISTRATIVE | Facility: CLINIC | Age: 61
End: 2021-08-07

## 2021-08-18 ENCOUNTER — PATIENT MESSAGE (OUTPATIENT)
Dept: TRANSPLANT | Facility: CLINIC | Age: 61
End: 2021-08-18

## 2021-09-28 ENCOUNTER — PATIENT MESSAGE (OUTPATIENT)
Dept: TRANSPLANT | Facility: CLINIC | Age: 61
End: 2021-09-28

## 2021-10-18 ENCOUNTER — TELEPHONE (OUTPATIENT)
Dept: TRANSPLANT | Facility: CLINIC | Age: 61
End: 2021-10-18

## 2021-10-21 ENCOUNTER — TELEPHONE (OUTPATIENT)
Dept: TRANSPLANT | Facility: CLINIC | Age: 61
End: 2021-10-21

## 2021-11-03 ENCOUNTER — TELEPHONE (OUTPATIENT)
Dept: TRANSPLANT | Facility: CLINIC | Age: 61
End: 2021-11-03
Payer: COMMERCIAL

## 2021-11-09 ENCOUNTER — TELEPHONE (OUTPATIENT)
Dept: TRANSPLANT | Facility: CLINIC | Age: 61
End: 2021-11-09
Payer: COMMERCIAL

## 2021-11-09 LAB
EXT ALBUMIN: 3.7
EXT ALKALINE PHOSPHATASE: 107
EXT ALT: 5
EXT AST: 19
EXT BASOPHIL%: 0.7
EXT BILIRUBIN TOTAL: 0.5
EXT BUN: 35
EXT CALCIUM: 8.4
EXT CHLORIDE: 107
EXT CO2: 18
EXT CREATININE: 2.4 MG/DL
EXT EOSINOPHIL%: 2.5
EXT GLUCOSE: 74
EXT HEMATOCRIT: 28.5
EXT HEMOGLOBIN: 9.4
EXT LYMPH%: 17.4
EXT MONOCYTES%: 8.2
EXT PLATELETS: 276
EXT POTASSIUM: 3.8
EXT PROTEIN TOTAL: 6.6
EXT SEGS%: 71.2
EXT SODIUM: 129 MMOL/L
EXT TACROLIMUS LVL: 3.5
EXT WBC: 6.5

## 2021-11-11 ENCOUNTER — TELEPHONE (OUTPATIENT)
Dept: TRANSPLANT | Facility: CLINIC | Age: 61
End: 2021-11-11

## 2021-11-11 NOTE — TELEPHONE ENCOUNTER
----- Message from Lisbeth Wagner RN sent at 11/10/2021  3:55 PM CST -----    ----- Message -----  From: Cierra Tello  Sent: 11/10/2021   3:50 PM CST  To: Wili Moss Staff    .Type:  Patient Call Back    Who Called: Dr. Hughes office       Does the patient know what this is regarding?: The provider wants to speak with the office about pt prograph level     Would the patient rather a call back yes     Best Call Back Number: [phone]    Additional Information: Thank You

## 2021-11-16 ENCOUNTER — TELEPHONE (OUTPATIENT)
Dept: TRANSPLANT | Facility: CLINIC | Age: 61
End: 2021-11-16
Payer: COMMERCIAL

## 2021-11-23 LAB
EXT BUN: 37
EXT CALCIUM: 8.5
EXT CHLORIDE: 104
EXT CO2: 37
EXT CREATININE: 2.32 MG/DL
EXT GLUCOSE: 98
EXT POTASSIUM: 4.3
EXT SODIUM: 129 MMOL/L
EXT TACROLIMUS LVL: 3.5

## 2021-11-23 RX ORDER — TACROLIMUS 0.5 MG/1
1.5 CAPSULE ORAL EVERY 12 HOURS
Qty: 180 CAPSULE | Refills: 11 | Status: SHIPPED | OUTPATIENT
Start: 2021-11-23 | End: 2022-01-01

## 2021-11-30 ENCOUNTER — PATIENT MESSAGE (OUTPATIENT)
Dept: TRANSPLANT | Facility: CLINIC | Age: 61
End: 2021-11-30
Payer: COMMERCIAL

## 2021-12-01 ENCOUNTER — OFFICE VISIT (OUTPATIENT)
Dept: TRANSPLANT | Facility: CLINIC | Age: 61
End: 2021-12-01
Payer: COMMERCIAL

## 2021-12-01 DIAGNOSIS — N18.30 STAGE 3 CHRONIC KIDNEY DISEASE, UNSPECIFIED WHETHER STAGE 3A OR 3B CKD: Chronic | ICD-10-CM

## 2021-12-01 DIAGNOSIS — Z94.0 KIDNEY REPLACED BY TRANSPLANT: ICD-10-CM

## 2021-12-01 DIAGNOSIS — Z94.4 STATUS POST LIVER TRANSPLANTATION: Primary | Chronic | ICD-10-CM

## 2021-12-01 PROCEDURE — 3066F PR DOCUMENTATION OF TREATMENT FOR NEPHROPATHY: ICD-10-PCS | Mod: CPTII,95,, | Performed by: INTERNAL MEDICINE

## 2021-12-01 PROCEDURE — 99213 PR OFFICE/OUTPT VISIT, EST, LEVL III, 20-29 MIN: ICD-10-PCS | Mod: 95,,, | Performed by: INTERNAL MEDICINE

## 2021-12-01 PROCEDURE — 99213 OFFICE O/P EST LOW 20 MIN: CPT | Mod: 95,,, | Performed by: INTERNAL MEDICINE

## 2021-12-01 PROCEDURE — 3066F NEPHROPATHY DOC TX: CPT | Mod: CPTII,95,, | Performed by: INTERNAL MEDICINE

## 2021-12-01 PROCEDURE — 4010F ACE/ARB THERAPY RXD/TAKEN: CPT | Mod: CPTII,95,, | Performed by: INTERNAL MEDICINE

## 2021-12-01 PROCEDURE — 4010F PR ACE/ARB THEARPY RXD/TAKEN: ICD-10-PCS | Mod: CPTII,95,, | Performed by: INTERNAL MEDICINE

## 2021-12-10 ENCOUNTER — TELEPHONE (OUTPATIENT)
Dept: TRANSPLANT | Facility: CLINIC | Age: 61
End: 2021-12-10
Payer: COMMERCIAL

## 2021-12-10 ENCOUNTER — PATIENT MESSAGE (OUTPATIENT)
Dept: TRANSPLANT | Facility: CLINIC | Age: 61
End: 2021-12-10
Payer: COMMERCIAL

## 2021-12-16 ENCOUNTER — TELEPHONE (OUTPATIENT)
Dept: TRANSPLANT | Facility: CLINIC | Age: 61
End: 2021-12-16
Payer: COMMERCIAL

## 2021-12-23 LAB
EXT ALBUMIN: 3.8
EXT ALKALINE PHOSPHATASE: 79
EXT ALT: 7
EXT AST: 15
EXT BASOPHIL%: 0.8
EXT BILIRUBIN TOTAL: 0.4
EXT BUN: 31
EXT CALCIUM: 8.3
EXT CHLORIDE: 101
EXT CO2: 22
EXT CREATININE: 2.54 MG/DL
EXT EOSINOPHIL%: 0.4
EXT GLUCOSE: 113
EXT HEMATOCRIT: 29.9
EXT HEMOGLOBIN: 9.7
EXT LYMPH%: 19.3
EXT MONOCYTES%: 7.3
EXT PLATELETS: 421
EXT POTASSIUM: 5
EXT PROTEIN TOTAL: 6.3
EXT SEGS%: 72.2
EXT SODIUM: 130 MMOL/L
EXT TACROLIMUS LVL: 5.4
EXT WBC: 3.3

## 2021-12-28 ENCOUNTER — TELEPHONE (OUTPATIENT)
Dept: TRANSPLANT | Facility: CLINIC | Age: 61
End: 2021-12-28

## 2022-01-01 ENCOUNTER — TELEPHONE (OUTPATIENT)
Dept: TRANSPLANT | Facility: CLINIC | Age: 62
End: 2022-01-01
Payer: COMMERCIAL

## 2022-01-01 ENCOUNTER — TELEPHONE (OUTPATIENT)
Dept: TRANSPLANT | Facility: CLINIC | Age: 62
End: 2022-01-01

## 2022-01-01 ENCOUNTER — PATIENT MESSAGE (OUTPATIENT)
Dept: TRANSPLANT | Facility: CLINIC | Age: 62
End: 2022-01-01
Payer: COMMERCIAL

## 2022-01-01 LAB
EXT ALBUMIN: 4.1
EXT ALKALINE PHOSPHATASE: 82
EXT ALT: 7
EXT AST: 19
EXT BASOPHIL%: 2.2
EXT BILIRUBIN TOTAL: 0.3
EXT BUN: 59
EXT CALCIUM: 8.5
EXT CHLORIDE: 111
EXT CO2: 18
EXT CREATININE: 3.34 MG/DL
EXT EOSINOPHIL%: 3
EXT GFR MDRD NON AF AMER: 14.9
EXT GLUCOSE: 123
EXT HEMATOCRIT: 30.8
EXT HEMOGLOBIN: 9.9
EXT LIPASE: <2
EXT LYMPH%: 13.3
EXT MONOCYTES%: 7.9
EXT PHOSPHORUS: 5.2
EXT PLATELETS: 313
EXT POTASSIUM: 5.4
EXT PROTEIN TOTAL: 7.6
EXT SEGS%: 73.6
EXT SODIUM: 136 MMOL/L
EXT WBC: 4.1

## 2022-01-20 ENCOUNTER — PATIENT MESSAGE (OUTPATIENT)
Dept: TRANSPLANT | Facility: CLINIC | Age: 62
End: 2022-01-20
Payer: COMMERCIAL

## 2022-01-20 DIAGNOSIS — Z85.05 HISTORY OF LIVER CANCER: Primary | ICD-10-CM

## 2022-01-20 DIAGNOSIS — Z94.4 LIVER REPLACED BY TRANSPLANT: ICD-10-CM

## 2022-01-20 DIAGNOSIS — Z94.0 KIDNEY REPLACED BY TRANSPLANT: ICD-10-CM

## 2022-01-24 ENCOUNTER — OFFICE VISIT (OUTPATIENT)
Dept: TRANSPLANT | Facility: CLINIC | Age: 62
End: 2022-01-24
Payer: COMMERCIAL

## 2022-01-24 ENCOUNTER — PATIENT MESSAGE (OUTPATIENT)
Dept: TRANSPLANT | Facility: CLINIC | Age: 62
End: 2022-01-24

## 2022-01-24 ENCOUNTER — LAB VISIT (OUTPATIENT)
Dept: LAB | Facility: HOSPITAL | Age: 62
End: 2022-01-24
Attending: INTERNAL MEDICINE
Payer: COMMERCIAL

## 2022-01-24 VITALS
OXYGEN SATURATION: 99 % | BODY MASS INDEX: 21.25 KG/M2 | WEIGHT: 108.25 LBS | SYSTOLIC BLOOD PRESSURE: 180 MMHG | HEIGHT: 60 IN | DIASTOLIC BLOOD PRESSURE: 90 MMHG | TEMPERATURE: 98 F | HEART RATE: 82 BPM | RESPIRATION RATE: 16 BRPM

## 2022-01-24 DIAGNOSIS — G89.4 CHRONIC PAIN SYNDROME: ICD-10-CM

## 2022-01-24 DIAGNOSIS — D84.9 IMMUNOSUPPRESSION: Chronic | ICD-10-CM

## 2022-01-24 DIAGNOSIS — N18.9 ANEMIA OF RENAL DISEASE: ICD-10-CM

## 2022-01-24 DIAGNOSIS — Z94.0 KIDNEY REPLACED BY TRANSPLANT: ICD-10-CM

## 2022-01-24 DIAGNOSIS — R80.1 PERSISTENT PROTEINURIA: ICD-10-CM

## 2022-01-24 DIAGNOSIS — D63.1 ANEMIA OF RENAL DISEASE: ICD-10-CM

## 2022-01-24 DIAGNOSIS — N18.9 ANEMIA OF RENAL DISEASE: Primary | ICD-10-CM

## 2022-01-24 DIAGNOSIS — E03.9 HYPOTHYROIDISM, UNSPECIFIED TYPE: Chronic | ICD-10-CM

## 2022-01-24 DIAGNOSIS — D63.1 ANEMIA OF RENAL DISEASE: Primary | ICD-10-CM

## 2022-01-24 DIAGNOSIS — G40.909 SEIZURE DISORDER: ICD-10-CM

## 2022-01-24 DIAGNOSIS — E55.9 VITAMIN D DEFICIENCY: ICD-10-CM

## 2022-01-24 DIAGNOSIS — R63.0 DECREASED APPETITE: ICD-10-CM

## 2022-01-24 DIAGNOSIS — K22.2 PEPTIC STRICTURE OF ESOPHAGUS: ICD-10-CM

## 2022-01-24 DIAGNOSIS — N25.81 SECONDARY HYPERPARATHYROIDISM OF RENAL ORIGIN: ICD-10-CM

## 2022-01-24 DIAGNOSIS — Z94.0 DECEASED-DONOR KIDNEY TRANSPLANT: Primary | Chronic | ICD-10-CM

## 2022-01-24 DIAGNOSIS — Z94.4 LIVER REPLACED BY TRANSPLANT: ICD-10-CM

## 2022-01-24 DIAGNOSIS — I10 ESSENTIAL HYPERTENSION: ICD-10-CM

## 2022-01-24 DIAGNOSIS — E87.1 HYPONATREMIA: ICD-10-CM

## 2022-01-24 DIAGNOSIS — Z94.0 KIDNEY REPLACED BY TRANSPLANT: Primary | ICD-10-CM

## 2022-01-24 DIAGNOSIS — F32.A DEPRESSIVE DISORDER: ICD-10-CM

## 2022-01-24 DIAGNOSIS — N18.4 CHRONIC KIDNEY DISEASE (CKD), ACTIVE MEDICAL MANAGEMENT WITHOUT DIALYSIS, STAGE 4 (SEVERE): ICD-10-CM

## 2022-01-24 DIAGNOSIS — E83.42 HYPOMAGNESEMIA: ICD-10-CM

## 2022-01-24 PROBLEM — F41.9 ANXIETY: Status: ACTIVE | Noted: 2022-01-24

## 2022-01-24 PROBLEM — J47.9 BRONCHIECTASIS: Status: ACTIVE | Noted: 2022-01-24

## 2022-01-24 PROBLEM — Z86.711 HISTORY OF PULMONARY EMBOLISM: Status: ACTIVE | Noted: 2019-11-19

## 2022-01-24 LAB
ALBUMIN SERPL BCP-MCNC: 3.6 G/DL (ref 3.5–5.2)
ALP SERPL-CCNC: 85 U/L (ref 55–135)
ALT SERPL W/O P-5'-P-CCNC: 6 U/L (ref 10–44)
ANION GAP SERPL CALC-SCNC: 10 MMOL/L (ref 8–16)
AST SERPL-CCNC: 11 U/L (ref 10–40)
BASOPHILS # BLD AUTO: 0.04 K/UL (ref 0–0.2)
BASOPHILS NFR BLD: 0.5 % (ref 0–1.9)
BILIRUB SERPL-MCNC: 0.2 MG/DL (ref 0.1–1)
BUN SERPL-MCNC: 49 MG/DL (ref 8–23)
CALCIUM SERPL-MCNC: 8.5 MG/DL (ref 8.7–10.5)
CHLORIDE SERPL-SCNC: 100 MMOL/L (ref 95–110)
CO2 SERPL-SCNC: 20 MMOL/L (ref 23–29)
CREAT SERPL-MCNC: 2.6 MG/DL (ref 0.5–1.4)
DIFFERENTIAL METHOD: ABNORMAL
EOSINOPHIL # BLD AUTO: 0.1 K/UL (ref 0–0.5)
EOSINOPHIL NFR BLD: 1.4 % (ref 0–8)
ERYTHROCYTE [DISTWIDTH] IN BLOOD BY AUTOMATED COUNT: 15.6 % (ref 11.5–14.5)
EST. GFR  (AFRICAN AMERICAN): 21.8 ML/MIN/1.73 M^2
EST. GFR  (NON AFRICAN AMERICAN): 18.9 ML/MIN/1.73 M^2
GLUCOSE SERPL-MCNC: 84 MG/DL (ref 70–110)
HCT VFR BLD AUTO: 30.7 % (ref 37–48.5)
HGB BLD-MCNC: 10 G/DL (ref 12–16)
IMM GRANULOCYTES # BLD AUTO: 0.04 K/UL (ref 0–0.04)
IMM GRANULOCYTES NFR BLD AUTO: 0.5 % (ref 0–0.5)
LYMPHOCYTES # BLD AUTO: 1.2 K/UL (ref 1–4.8)
LYMPHOCYTES NFR BLD: 13.3 % (ref 18–48)
MAGNESIUM SERPL-MCNC: 2 MG/DL (ref 1.6–2.6)
MCH RBC QN AUTO: 33.8 PG (ref 27–31)
MCHC RBC AUTO-ENTMCNC: 32.6 G/DL (ref 32–36)
MCV RBC AUTO: 104 FL (ref 82–98)
MONOCYTES # BLD AUTO: 1 K/UL (ref 0.3–1)
MONOCYTES NFR BLD: 10.8 % (ref 4–15)
NEUTROPHILS # BLD AUTO: 6.5 K/UL (ref 1.8–7.7)
NEUTROPHILS NFR BLD: 73.5 % (ref 38–73)
NRBC BLD-RTO: 0 /100 WBC
PHOSPHATE SERPL-MCNC: 3.9 MG/DL (ref 2.7–4.5)
PLATELET # BLD AUTO: 381 K/UL (ref 150–450)
PMV BLD AUTO: 9.2 FL (ref 9.2–12.9)
POTASSIUM SERPL-SCNC: 4.6 MMOL/L (ref 3.5–5.1)
PROT SERPL-MCNC: 6.6 G/DL (ref 6–8.4)
RBC # BLD AUTO: 2.96 M/UL (ref 4–5.4)
SODIUM SERPL-SCNC: 130 MMOL/L (ref 136–145)
TACROLIMUS BLD-MCNC: 5.9 NG/ML (ref 5–15)
WBC # BLD AUTO: 8.88 K/UL (ref 3.9–12.7)

## 2022-01-24 PROCEDURE — 1159F MED LIST DOCD IN RCRD: CPT | Mod: CPTII,S$GLB,, | Performed by: INTERNAL MEDICINE

## 2022-01-24 PROCEDURE — 1160F RVW MEDS BY RX/DR IN RCRD: CPT | Mod: CPTII,S$GLB,, | Performed by: INTERNAL MEDICINE

## 2022-01-24 PROCEDURE — 80197 ASSAY OF TACROLIMUS: CPT | Performed by: INTERNAL MEDICINE

## 2022-01-24 PROCEDURE — 3077F SYST BP >= 140 MM HG: CPT | Mod: CPTII,S$GLB,, | Performed by: INTERNAL MEDICINE

## 2022-01-24 PROCEDURE — 4010F PR ACE/ARB THEARPY RXD/TAKEN: ICD-10-PCS | Mod: CPTII,S$GLB,, | Performed by: INTERNAL MEDICINE

## 2022-01-24 PROCEDURE — 99215 PR OFFICE/OUTPT VISIT, EST, LEVL V, 40-54 MIN: ICD-10-PCS | Mod: S$GLB,,, | Performed by: INTERNAL MEDICINE

## 2022-01-24 PROCEDURE — 1160F PR REVIEW ALL MEDS BY PRESCRIBER/CLIN PHARMACIST DOCUMENTED: ICD-10-PCS | Mod: CPTII,S$GLB,, | Performed by: INTERNAL MEDICINE

## 2022-01-24 PROCEDURE — 83735 ASSAY OF MAGNESIUM: CPT | Performed by: INTERNAL MEDICINE

## 2022-01-24 PROCEDURE — 3080F PR MOST RECENT DIASTOLIC BLOOD PRESSURE >= 90 MM HG: ICD-10-PCS | Mod: CPTII,S$GLB,, | Performed by: INTERNAL MEDICINE

## 2022-01-24 PROCEDURE — 99999 PR PBB SHADOW E&M-EST. PATIENT-LVL V: CPT | Mod: PBBFAC,,, | Performed by: INTERNAL MEDICINE

## 2022-01-24 PROCEDURE — 99999 PR PBB SHADOW E&M-EST. PATIENT-LVL V: ICD-10-PCS | Mod: PBBFAC,,, | Performed by: INTERNAL MEDICINE

## 2022-01-24 PROCEDURE — 80321 ALCOHOLS BIOMARKERS 1OR 2: CPT | Performed by: INTERNAL MEDICINE

## 2022-01-24 PROCEDURE — 3066F PR DOCUMENTATION OF TREATMENT FOR NEPHROPATHY: ICD-10-PCS | Mod: CPTII,S$GLB,, | Performed by: INTERNAL MEDICINE

## 2022-01-24 PROCEDURE — 4010F ACE/ARB THERAPY RXD/TAKEN: CPT | Mod: CPTII,S$GLB,, | Performed by: INTERNAL MEDICINE

## 2022-01-24 PROCEDURE — 84100 ASSAY OF PHOSPHORUS: CPT | Performed by: INTERNAL MEDICINE

## 2022-01-24 PROCEDURE — 3080F DIAST BP >= 90 MM HG: CPT | Mod: CPTII,S$GLB,, | Performed by: INTERNAL MEDICINE

## 2022-01-24 PROCEDURE — 80053 COMPREHEN METABOLIC PANEL: CPT | Performed by: INTERNAL MEDICINE

## 2022-01-24 PROCEDURE — 85025 COMPLETE CBC W/AUTO DIFF WBC: CPT | Performed by: INTERNAL MEDICINE

## 2022-01-24 PROCEDURE — 3008F BODY MASS INDEX DOCD: CPT | Mod: CPTII,S$GLB,, | Performed by: INTERNAL MEDICINE

## 2022-01-24 PROCEDURE — 3066F NEPHROPATHY DOC TX: CPT | Mod: CPTII,S$GLB,, | Performed by: INTERNAL MEDICINE

## 2022-01-24 PROCEDURE — 36415 COLL VENOUS BLD VENIPUNCTURE: CPT | Performed by: INTERNAL MEDICINE

## 2022-01-24 PROCEDURE — 3077F PR MOST RECENT SYSTOLIC BLOOD PRESSURE >= 140 MM HG: ICD-10-PCS | Mod: CPTII,S$GLB,, | Performed by: INTERNAL MEDICINE

## 2022-01-24 PROCEDURE — 3008F PR BODY MASS INDEX (BMI) DOCUMENTED: ICD-10-PCS | Mod: CPTII,S$GLB,, | Performed by: INTERNAL MEDICINE

## 2022-01-24 PROCEDURE — 99215 OFFICE O/P EST HI 40 MIN: CPT | Mod: S$GLB,,, | Performed by: INTERNAL MEDICINE

## 2022-01-24 PROCEDURE — 1159F PR MEDICATION LIST DOCUMENTED IN MEDICAL RECORD: ICD-10-PCS | Mod: CPTII,S$GLB,, | Performed by: INTERNAL MEDICINE

## 2022-01-24 RX ORDER — LEVETIRACETAM 500 MG/1
500 TABLET ORAL 2 TIMES DAILY
COMMUNITY
Start: 2021-12-10 | End: 2022-09-29

## 2022-01-24 RX ORDER — FAMOTIDINE 20 MG/1
20 TABLET, FILM COATED ORAL 2 TIMES DAILY
Qty: 60 TABLET | Refills: 11 | COMMUNITY
Start: 2022-01-24 | End: 2023-01-01

## 2022-01-24 RX ORDER — LEVETIRACETAM 250 MG/1
250 TABLET ORAL 2 TIMES DAILY
COMMUNITY

## 2022-01-24 RX ORDER — AMLODIPINE BESYLATE 10 MG/1
10 TABLET ORAL DAILY
Qty: 30 TABLET | Refills: 11 | Status: SHIPPED | OUTPATIENT
Start: 2022-01-24

## 2022-01-24 RX ORDER — MYCOPHENOLIC ACID 180 MG/1
360 TABLET, DELAYED RELEASE ORAL 2 TIMES DAILY
Qty: 120 TABLET | Refills: 11 | Status: SHIPPED | OUTPATIENT
Start: 2022-01-24 | End: 2023-01-01

## 2022-01-24 NOTE — PROGRESS NOTES
Kidney/Liver Post-Transplant Assessment    Referring Physician: Melissa Garcias  Current Nephrologist:     ORGAN: RIGHT KIDNEY  Donor Type: donation after brain death  PHS Increased Risk: no  Cold Ischemia: 480 mins  Induction Medications: steroids (prednisone,methylprednisolone,solumedrol,medrol,decadron)    Subjective:     CC:  Reassessment of renal allograft function and management of chronic immunosuppression.    HPI:  Ms. Lentz is a 62 y.o. year old White female who presents with daughter for yearly follow-up appt. Ms. Lentz received a donation after brain death kidney and liver transplant on 11/29/11.  She has CKD stage 4 - GFR 15-29 and her sCr level in 2019 was 1.7 and for 2020 it was 1.66, as of 5/7/2021 her sCr was 2.6. She takes mycophenolate mofetil, prednisone and tacrolimus for maintenance immunosuppression. She was hospitalized back in December 2021 for new onset seizures and was also treated for hyponatremia. Pt was started on Keppra 500mg, however as of last Wednesday 01/19/2022 her dose was reduced to 250mg BID. As for her hyponatremia, pt has been taking 1g Na tabs bid.     Pt has the following concerns: worsening fatigue, ongoing diarrhea, decreased appetite (drinking 6 bottles of water/day and 2-3 cans of ensure/day) and worsening of depression symptoms (no SI/HI, more hopelessness, decreased energy, and unable to focus) since stopping her Prozac (it was stopped 2/2 hyponatremia), thyroid labs have not been checked in >1 yr. Pt also is worried about her renal function not improving and afraid of progression into ESRD and dialysis.   Pt does have a nephrologist (Dr. Hughes) who she sees routinely and a primary care doctor as well.      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          Review of Systems Pt denies: Fever, chills, shortness of breath, chest pain, palpitations, abd pain, hematuria, dysuria, urinary frequency or urgency, headaches, visual disturbances or weakness.      Objective:   Blood pressure (!) 180/90, pulse 82, temperature 97.7 °F (36.5 °C), temperature source Tympanic, resp. rate 16, height 5' (1.524 m), weight 49.1 kg (108 lb 3.9 oz), SpO2 99 %.body mass index is 21.14 kg/m².    Physical Exam  Vitals and nursing note reviewed.   Constitutional:       General: She is not in acute distress.     Appearance: Normal appearance. She is not ill-appearing, toxic-appearing or diaphoretic.   Eyes:      General: No scleral icterus.  Cardiovascular:      Rate and Rhythm: Normal rate and regular rhythm.      Pulses: Normal pulses.      Heart sounds: No murmur heard.      Pulmonary:      Effort: Pulmonary effort is normal. No respiratory distress.      Breath sounds: No wheezing, rhonchi or rales.   Abdominal:      General: Abdomen is flat. Bowel sounds are normal. There is no distension.      Palpations: Abdomen is soft. There is no mass.      Tenderness: There is no abdominal tenderness.   Musculoskeletal:         General: No swelling, tenderness or deformity.      Right lower leg: Edema present.      Left lower leg: Edema present.      Comments: Alli ankle and foot swelling    Skin:     General: Skin is warm.      Capillary Refill: Capillary refill takes 2 to 3 seconds.      Coloration: Skin is not jaundiced or pale.      Findings: No bruising, erythema, lesion or rash.   Neurological:      Mental Status: She is alert and oriented to person, place, and time.   Psychiatric:         Mood and Affect: Mood normal.         Behavior: Behavior normal.         Thought Content: Thought content normal.         Judgment: Judgment normal.         Labs:  Lab Results   Component Value Date    WBC 8.88 01/24/2022    HGB  10.0 (L) 2022    HCT 30.7 (L) 2022     (L) 2022    K 4.6 2022     2022    CO2 20 (L) 2022    BUN 49 (H) 2022    CREATININE 2.6 (H) 2022    EGFRNONAA 18.9 (A) 2022    GLUCOSE 130 (H) 2011    CALCIUM 8.5 (L) 2022    PHOS 3.9 2022    MG 2.0 2022    ALBUMIN 3.6 2022    AST 11 2022    ALT 6 (L) 2022       Lab Results   Component Value Date    EXTWBC 3.3 2021    EXTSEGS 72.2 2021    EXTPLATELETS 421 2021    EXTHEMOGLOBI 9.7 2021    EXTHEMATOCRI 29.9 2021    EXTCREATININ 2.54 2021    EXTSODIUM 130 2021    EXTPOTASSIUM 5 2021    EXTBUN 31 2021    EXTCO2 22 2021    EXTCALCIUM 8.3 2021    EXTPHOSPHORU 3.6 2020    EXTGLUCOSE 113 2021    EXTALBUMIN 3.8 2021    EXTAST 15 2021    EXTALT 7 2021    EXTBILITOTAL 0.4 2021       Lab Results   Component Value Date    EXTTACROLVL 5.4 2021    EXTPROTCRE 2.7 2021    EXTPROTEINUA 148 2021    EXTWBCUA 5-10 2021    EXTRBCUA 0-3 2021       Labs were reviewed with the patient.    Assessment:     1. -donor kidney transplant    2. Anemia of renal disease    3. Hypothyroidism, unspecified type    4. Essential hypertension    5. Depressive disorder    6. Immunosuppression    7. Vitamin D deficiency    8. Peptic stricture of esophagus    9. Hypomagnesemia    10. Decreased appetite    11. Hyponatremia    12. Chronic kidney disease (CKD), active medical management without dialysis, stage 4 (severe)    13. Seizure disorder    14. Chronic pain syndrome        Plan:     Problem List Items Addressed This Visit        Neuro    Chronic pain syndrome     Dicussed pt that gabapentin should be only used if needed and to discuss with primary care doctor or neurology if gabapentin can be decreased to 100mg PRN          Seizure disorder     New onset seizure disorder  2021 unk etiology workup done at OSH was negative for stroke or cardiac arrhthymia    Plan:   -pt to discuss with neurology continuation of Keppra 250mg BID               Psychiatric    Depressive disorder     -has stopped Prozac 2/2 hyponatremia  Plan:  -psychiatry and behavioral health counseling strongly recommended  -pt to contact PCP office if symptoms worsen            Cardiac/Vascular    Essential hypertension     Uncontrolled, -190's  Plan:   -increase amlodipine to 10mg daily   -continue ASA 81mg daily   -continue losartan 25mg daily   -will check UPCR as well (may be d/t progression of CKD or dehydration)   -check and record blood pressure readings at home and bring logs to next appointment          Relevant Medications    amLODIPine (NORVASC) 10 MG tablet       Renal/    -donor kidney transplant - Primary (Chronic)     Baseline sCr appears to now be around 2.6 (as of 2021)   This progression in CKD was evaluated with a kidney biopsy on 2021 showed 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   Plan:   -discussed in detailed that there are many factors that can worsen kidney function, there is a concern that the CKD is progressing however pt has also been having poor oral intake for quite sometime. Discussed the need for closer f/u and repeat some labs today/tomorrow morning  -check cystatin C, UPCR  -see immunosuppression   Will discuss case with Dr. Rasheed          Relevant Medications    mycophenolate (MYFORTIC) 180 MG TbEC    Hypomagnesemia     Last magnesium level 2 (2022)  Plan:   -decrease to 1 tab BID and may consider different version of Magnesium supplement, one with amino acid to see if it helps decrease diarrhea           Hyponatremia     Last Na level 130 (2022) likely d/t poor solute intake   Plan:   -increase PO intake, reviewed all medications and it does not appear  this pt is taking medication that is decreasing her sodium  -Increase sodium tabs to 2 tablets BID   -continue with good hydration, 6 bottles of water OK until next set of labs          Chronic kidney disease (CKD), active medical management without dialysis, stage 4 (severe)     CKD IV post transplant (11/2011)   Baseline sCr appears to be 2.6   Renal Bx 5/20221:  showed 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    Plan:   -monitor renal function closely   -check PTH   -check anemia panel   -discussed symptoms of uremia   -check UPCR               Immunology/Multi System    Immunosuppression (Chronic)     -will continue tacrolimus at the same dose 1.5mg Q12H  -change Cellcept to Myfortic 360mg BID (to see if it helps the GI symptoms)   -cont prednisone 5mg daily   -hold valacyclovir and see if there's any sym toms (this is in an effort to decrease pill burden/polypharmacy)   -Monitor for side effects and toxicities, given narrow therapeutic window and significant risk of AE            Oncology    Anemia of renal disease     Lab Results   Component Value Date    HGB 10.0 (L) 01/24/2022     Plan:   -check anemia panel including folate and vitamin b12          Relevant Orders    Iron and TIBC (Completed)    Ferritin (Completed)    Folate (Completed)    Vitamin B12 (Completed)       Endocrine    Hypothyroidism (Chronic)     Pt c/o worsening fatigue, decreased appetite, last TSH (2020)   -have order TSH, pt aware to f/u with PCP for results          Relevant Orders    Iron and TIBC (Completed)    Ferritin (Completed)    Folate (Completed)    Vitamin B12 (Completed)    Vitamin D deficiency     Pt on vitamin D monthly 50,000 units             GI    Peptic stricture of esophagus     Trial of famotidine 20mg daily while holding PPI (nexium)             Other    Decreased appetite     >3 months of worsening decrease in appetite and  fatigue likely d/t multifactorial etiology: polypharmacy, progression of CKD, unmanaged depression, and many GI disturbances     Plan:   -check TSH, anemia panel   -only take gabapentin if pt really needs it and may consider talking to PCP/neurology to decrease the dose from 300 to 100mg  -may be related to hyponatremia-discussed a plan for increasing po intake w/o increasing water too much since it can worsen hyponatremia   -changed from Cellcept to myfortic to see if it helps the GI sym toms, if symptoms persist pt to see GI                 Follow-up:   Annual follow-up with kidney transplant clinic per written guidelines.  Patient also reminded to follow-up with general nephrologist.      Damaris Gamez MD       Education:   Material provided to the patient.  Patient reminded to call with any health changes since these can be early signs of significant complications.  Also, I advised the patient to be sure any new medications or changes of old medications are discussed with either a pharmacist or physician knowledgeable with transplant to avoid rejection/drug toxicity related to significant drug interactions.    Patient advised that it is recommended that all transplant candidates, and their close contacts and household members receive Covid vaccination.    UNOS Patient Status  Functional Status: 80% - Normal activity with effort: some symptoms of disease  Physical Capacity: No Limitations

## 2022-01-24 NOTE — LETTER
January 24, 2022        Melissa Garcias  [address]  [address]  [place] [address]  Phone: [phone]  Fax: [phone]             [facility] 1st Fl  [address]  [facility] [address]  Phone: [phone]   Patient: Virginia Lentz   MR Number: [id number]   YOB: 1960   Date of Visit: 1/24/2022       Dear Dr. Melissa Garcias    Thank you for referring Virginia Lentz to me for evaluation. Attached you will find relevant portions of my assessment and plan of care.    If you have questions, please do not hesitate to call me. I look forward to following Virginia Lentz along with you.    Sincerely,    Lorin Retana MD    Enclosure    If you would like to receive this communication electronically, please contact [email or web address] or [phone] to request e[employer] Link access.    [employer] Link is a tool which provides read-only access to select patient information with whom you have a relationship. Its easy to use and provides real time access to review your patients record including encounter summaries, notes, results, and demographic information.    If you feel you have received this communication in error or would no longer like to receive these types of communications, please e-mail [email or web address]

## 2022-01-24 NOTE — PATIENT INSTRUCTIONS
Congratulations on reaching another anniversary after transplant! I expect you will have many more!    -You will need a team to care for you the best way possible! For your health, you should follow with your general nephrologist, primary care provider/PCP, dentist and eye doctor, and GYN (for ladies) for routine/preventative care. If you are a diabetic, you should see a podiatrist for regular foot checks. If you need help establishing with one, let us know.    -Your transplant team will renew your immunosuppressant and transplant related medications at your annual visits, and we will defer the rest to your other providers.    -Remember to keep transplant posted about medication changes or new developments in your health. These may warrant changing your immunosuppression. We are not automatically notified of changes in your condition, so please call or message us with any updates.    -Don't forget we have pharmacist, dietician and social workers that are able to help you, at your request.    -We recommend you stay up to date with vaccines - your primary care provider will help with this, since vaccine recommendations vary and get updated. You should get a yearly influenza vaccine. It does not protect you against all infections, and you can still get the flu. It is proven to protect transplant patients from influenza related serious    SAFE OVER THE COUNTER REMEDIES  Acne  - Clean affected areas with Panoxyl foaming wash [or any other wash containing 10% benzoyl peroxide] - follow direction on package insert and beware it can bleach fabrics and hair.  - You may also try Differin [adapalene gel  0.1%] to the affected areas as per package insert as well.  - A good moisturizer may be needed if skin dries out. Cetaphil and Cerave make ones often recommended by dermatologists.  - Don't forget to protect your skin from the sun, since you are at increased risk of skin cancer.    Fever or pain   - Tylenol (acetaminophen).    - For pain you can try salon pas or lidocaine patches to be applied directly to area of pain.   - Diclofenac [Voltaren] gel is safe to use for a few weeks.    - We do not recommend NSAIDS  by mouth such as Motrin, Advil, Aleve, naprosen, BC powders, etc. If in doubt, please check!    Cough Suppressant - Dextromethorphan Hydrobromide 30 mg or cough drops (Halls or equivalent generic)    Nasal congestion   - Saline nasal spray or Afrin nasal spray.   - Topical vicks vaporub or nasal inhaler is also acceptable.  - (Afrin is oxymetazoline, but should ONLY USE FOR 3 DAYS).   - Note tablet form decongestants (Sudafed, phenylephrine) can raise blood pressure and are not recommended    Allergy symptoms - Antihistamine (Benadryl, Claritin, Zyrtec). These can also help dry up drainage.    For sore throat -  salt water gargles, Chloraseptic spray or sore throat lozenges     For thick secretions (thick mucous) - Guaifenesin (Mucinex), saline nasal spray     To prevent colds - low dose vitamin C is probably OK, but can increase risk of kidney stones. Zinc lozenges (not tablets) taken through the day may help minimize. Let the lozenge dissolve in the back of your throat. Note: Zinc can inhibit the virus from multiplying in your throat, but it is not proven to prevent colds.    Indigestion  - famotidine (Pepcid) 20 mg daily is generally safe. Tums can cause high calcium and should be taken only after taking to your provider about your calcium levels. High calcium can hurt the kidney. Avoid cimetidine (Tagamet) as it can interfere with your immunosuppression and cause toxic levels. A few doses of pepto bismol should be fine. Continued indigestion should be evaluated by your provider.    Nausea or vomiting - these symptoms can indicate there is something wrong with your stomach and should be evaluated if they last more than 6-8 hours OR anytime you cannot keep your medicines down. Not being able to take your medicine can cause  complications.     Diarrhea -  Pepto-Bismol, even Metamucil can help liquid stools. Diarrhea can be a sign of infection, so please let your provider know if it continues past 1-2 days for full evaluation. Avoid Imodium unless you have spoken to a physician.    Constipation- Colace, Dulcolax, MiraLax are safe to take regardless of your kidney function.  Please check with the physician before using magnesium or phosphorus containing supplements such as magnesium citrate, milk of magnesium, or Fleet's Phospho-Soda.  These remedies may cause harm, depending on the degree of kidney insufficiency you have.  Your doctor can advise if year magnesium and phosphorus levels are low enough to take these.    Herbal and natural remedies - Some herbals are very safe and effective while others are proven to cause renal failure or interact with your medication. Do NOT take any natural or herbal remedies without discussing with transplant.    If you develop fever or shortness of breath, or start to feel worse, you need to get checked out again or go to ED, depending on the severity of your symptoms.    As always, feel free to ask any questions and raise any concerns regarding your health care.    Best Wishes,  Dr. Xiomara Retana and your entire transplant team

## 2022-01-24 NOTE — ASSESSMENT & PLAN NOTE
Lab Results   Component Value Date    HGB 10.0 (L) 01/24/2022     Plan:   -check anemia panel including folate and vitamin b12

## 2022-01-24 NOTE — ASSESSMENT & PLAN NOTE
Pt c/o worsening fatigue, decreased appetite, last TSH (2020)   -have order TSH, pt aware to f/u with PCP for results

## 2022-01-24 NOTE — Clinical Note
Virginia and her mom had a lot of concerns. It appears her kidney is failing, she is very depressed, not eating... I ordered a bunch of studies to eval anemia, hyponatremia, etc. She has been seeing her nep but I cannot see when last seen or what plan is. She is struggling to accept failing txp. I asked Dr. Gamez to update you as well.  I wonder if you want to do virtual visit in a few months to see if she needs re-referral for second transplant. I also told her to back off from the 6-7  bottles of water she is drinking. We will see what osmolality shows, but between drinking so much water for her 108lb body + CKD + poor solute intake, I did not think drinking over 3 L a day was helping her. She and daughter were very concerned that creat may worsen, but I was more worried she looked frail and low Na was contributing.

## 2022-01-25 ENCOUNTER — TELEPHONE (OUTPATIENT)
Dept: TRANSPLANT | Facility: CLINIC | Age: 62
End: 2022-01-25
Payer: COMMERCIAL

## 2022-01-25 ENCOUNTER — PATIENT MESSAGE (OUTPATIENT)
Dept: TRANSPLANT | Facility: CLINIC | Age: 62
End: 2022-01-25
Payer: COMMERCIAL

## 2022-01-25 ENCOUNTER — LAB VISIT (OUTPATIENT)
Dept: LAB | Facility: HOSPITAL | Age: 62
End: 2022-01-25
Attending: INTERNAL MEDICINE
Payer: COMMERCIAL

## 2022-01-25 DIAGNOSIS — E03.9 HYPOTHYROIDISM, UNSPECIFIED TYPE: Chronic | ICD-10-CM

## 2022-01-25 DIAGNOSIS — N18.9 ANEMIA OF RENAL DISEASE: ICD-10-CM

## 2022-01-25 DIAGNOSIS — Z94.0 KIDNEY REPLACED BY TRANSPLANT: ICD-10-CM

## 2022-01-25 DIAGNOSIS — D63.1 ANEMIA OF RENAL DISEASE: ICD-10-CM

## 2022-01-25 DIAGNOSIS — E87.1 HYPONATREMIA: ICD-10-CM

## 2022-01-25 DIAGNOSIS — N18.4 CHRONIC KIDNEY DISEASE (CKD), ACTIVE MEDICAL MANAGEMENT WITHOUT DIALYSIS, STAGE 4 (SEVERE): ICD-10-CM

## 2022-01-25 LAB
ALBUMIN SERPL BCP-MCNC: 3.6 G/DL (ref 3.5–5.2)
ANION GAP SERPL CALC-SCNC: 12 MMOL/L (ref 8–16)
BUN SERPL-MCNC: 47 MG/DL (ref 8–23)
CALCIUM SERPL-MCNC: 9.2 MG/DL (ref 8.7–10.5)
CHLORIDE SERPL-SCNC: 104 MMOL/L (ref 95–110)
CO2 SERPL-SCNC: 23 MMOL/L (ref 23–29)
CREAT SERPL-MCNC: 2.8 MG/DL (ref 0.5–1.4)
EST. GFR  (AFRICAN AMERICAN): 20 ML/MIN/1.73 M^2
EST. GFR  (NON AFRICAN AMERICAN): 17.3 ML/MIN/1.73 M^2
FERRITIN SERPL-MCNC: 21 NG/ML (ref 20–300)
FOLATE SERPL-MCNC: 9.4 NG/ML (ref 4–24)
GLUCOSE SERPL-MCNC: 80 MG/DL (ref 70–110)
IRON SERPL-MCNC: 29 UG/DL (ref 30–160)
OSMOLALITY SERPL: 312 MOSM/KG (ref 275–295)
PHOSPHATE SERPL-MCNC: 4.7 MG/DL (ref 2.7–4.5)
POTASSIUM SERPL-SCNC: 5 MMOL/L (ref 3.5–5.1)
PTH-INTACT SERPL-MCNC: 673.5 PG/ML (ref 9–77)
SATURATED IRON: 7 % (ref 20–50)
SODIUM SERPL-SCNC: 139 MMOL/L (ref 136–145)
T4 FREE SERPL-MCNC: 0.89 NG/DL (ref 0.71–1.51)
TOTAL IRON BINDING CAPACITY: 403 UG/DL (ref 250–450)
TRANSFERRIN SERPL-MCNC: 272 MG/DL (ref 200–375)
TSH SERPL DL<=0.005 MIU/L-ACNC: 5.01 UIU/ML (ref 0.4–4)
VIT B12 SERPL-MCNC: 293 PG/ML (ref 210–950)

## 2022-01-25 PROCEDURE — 84443 ASSAY THYROID STIM HORMONE: CPT | Performed by: INTERNAL MEDICINE

## 2022-01-25 PROCEDURE — 82607 VITAMIN B-12: CPT | Performed by: INTERNAL MEDICINE

## 2022-01-25 PROCEDURE — 83970 ASSAY OF PARATHORMONE: CPT | Performed by: INTERNAL MEDICINE

## 2022-01-25 PROCEDURE — 82728 ASSAY OF FERRITIN: CPT | Performed by: INTERNAL MEDICINE

## 2022-01-25 PROCEDURE — 82610 CYSTATIN C: CPT | Performed by: INTERNAL MEDICINE

## 2022-01-25 PROCEDURE — 83930 ASSAY OF BLOOD OSMOLALITY: CPT | Performed by: INTERNAL MEDICINE

## 2022-01-25 PROCEDURE — 82746 ASSAY OF FOLIC ACID SERUM: CPT | Performed by: INTERNAL MEDICINE

## 2022-01-25 PROCEDURE — 80069 RENAL FUNCTION PANEL: CPT | Performed by: INTERNAL MEDICINE

## 2022-01-25 PROCEDURE — 84466 ASSAY OF TRANSFERRIN: CPT | Performed by: INTERNAL MEDICINE

## 2022-01-25 PROCEDURE — 84439 ASSAY OF FREE THYROXINE: CPT | Performed by: INTERNAL MEDICINE

## 2022-01-25 NOTE — ASSESSMENT & PLAN NOTE
Last magnesium level 2 (1/24/2022)  Plan:   -decrease to 1 tab BID and may consider different version of Magnesium supplement, one with amino acid to see if it helps decrease diarrhea

## 2022-01-25 NOTE — ASSESSMENT & PLAN NOTE
>3 months of worsening decrease in appetite and fatigue likely d/t multifactorial etiology: polypharmacy, progression of CKD, unmanaged depression, and many GI disturbances     Plan:   -check TSH, anemia panel   -only take gabapentin if pt really needs it and may consider talking to PCP/neurology to decrease the dose from 300 to 100mg  -may be related to hyponatremia-discussed a plan for increasing po intake w/o increasing water too much since it can worsen hyponatremia   -changed from Cellcept to myfortic to see if it helps the GI sym toms, if symptoms persist pt to see GI

## 2022-01-25 NOTE — LETTER
January 25, 2022    Virginia Lentz  [address] [id number]          Dear Virginia Lentz:  MRN: [id number]    This is a follow up to your recent labs, your lab results were stable.  There are no medicine changes.  Please have your labs drawn again on 4/17/22.      If you cannot have your labs drawn on the scheduled date, it is your responsibility to call the transplant department to reschedule.  Please call [phone] and ask to speak to Gogo MEEK   for all scheduling requests.     Sincerely,      Angelic SERVINN, RN  Your Liver Transplant Coordinator    [facility]  [address] [place] [id number]  [phone]

## 2022-01-25 NOTE — ASSESSMENT & PLAN NOTE
-will continue tacrolimus at the same dose 1.5mg Q12H  -change Cellcept to Myfortic 360mg BID (to see if it helps the GI symptoms)   -cont prednisone 5mg daily   -hold valacyclovir and see if there's any sym toms (this is in an effort to decrease pill burden/polypharmacy)   -Monitor for side effects and toxicities, given narrow therapeutic window and significant risk of AE

## 2022-01-25 NOTE — ASSESSMENT & PLAN NOTE
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   Plan:   -discussed in detailed that there are many factors that can worsen kidney function, there is a concern that the CKD is progressing however pt has also been having poor oral intake for quite sometime. Discussed the need for closer f/u and repeat some labs today/tomorrow morning  -check cystatin C, UPCR  -see immunosuppression   Will discuss case with Dr. Rasheed

## 2022-01-25 NOTE — ASSESSMENT & PLAN NOTE
-has stopped Prozac 2/2 hyponatremia  Plan:  -psychiatry and behavioral health counseling strongly recommended  -pt to contact PCP office if symptoms worsen

## 2022-01-25 NOTE — ASSESSMENT & PLAN NOTE
Uncontrolled, -190's  Plan:   -increase amlodipine to 10mg daily   -continue ASA 81mg daily   -continue losartan 25mg daily   -will check UPCR as well (may be d/t progression of CKD or dehydration)   -check and record blood pressure readings at home and bring logs to next appointment

## 2022-01-25 NOTE — ASSESSMENT & PLAN NOTE
New onset seizure disorder 12/2021 unk etiology workup done at OSH was negative for stroke or cardiac arrhthymia    Plan:   -pt to discuss with neurology continuation of Keppra 250mg BID

## 2022-01-25 NOTE — ASSESSMENT & PLAN NOTE
Last Na level 130 (1/24/2022) likely d/t poor solute intake   Plan:   -increase PO intake, reviewed all medications and it does not appear this pt is taking medication that is decreasing her sodium  -Increase sodium tabs to 2 tablets BID   -continue with good hydration, 6 bottles of water OK until next set of labs

## 2022-01-25 NOTE — TELEPHONE ENCOUNTER
Stable labs, no medication changes.  Next labs 4/17/22, letter and MyChart message sent to notify pt.  ----- Message from Ruben Ferrell MD sent at 1/25/2022  3:08 PM CST -----  Results reviewed

## 2022-01-25 NOTE — ASSESSMENT & PLAN NOTE
Dicussed pt that gabapentin should be only used if needed and to discuss with primary care doctor or neurology if gabapentin can be decreased to 100mg PRN

## 2022-01-25 NOTE — ASSESSMENT & PLAN NOTE
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    Plan:   -monitor renal function closely   -check PTH   -check anemia panel   -discussed symptoms of uremia   -check UPCR

## 2022-01-26 LAB
CYSTATIN C SERPL-MCNC: 3.02 MG/L (ref 0.67–1.21)
GFR/BSA.PRED SERPLBLD CYS-BASED-ARV: 16 ML/MIN/BSA
PETH 16:0/18.1 (POPETH): <10 NG/ML
PETH 16:0/18.2 (PLPETH): <10 NG/ML

## 2022-01-26 RX ORDER — LANOLIN ALCOHOL/MO/W.PET/CERES
1000 CREAM (GRAM) TOPICAL DAILY
Qty: 30 TABLET | Refills: 5 | Status: SHIPPED | OUTPATIENT
Start: 2022-01-26

## 2022-01-26 RX ORDER — CALCITRIOL 0.25 UG/1
0.5 CAPSULE ORAL DAILY
Qty: 60 CAPSULE | Refills: 11 | Status: SHIPPED | OUTPATIENT
Start: 2022-01-26

## 2022-01-27 ENCOUNTER — TELEPHONE (OUTPATIENT)
Dept: TRANSPLANT | Facility: CLINIC | Age: 62
End: 2022-01-27
Payer: COMMERCIAL

## 2022-01-27 NOTE — TELEPHONE ENCOUNTER
Pt request labs be sent to her local nephrologist.  ----- Message from Ever Taveras sent at 1/27/2022 11:01 AM CST -----  Regarding: Speak to coordinator  Patient calling to speak to coordinator concerning medications and labs being sent to her doctors in [place], [place].    Call: [phone]

## 2022-01-28 ENCOUNTER — TELEPHONE (OUTPATIENT)
Dept: TRANSPLANT | Facility: CLINIC | Age: 62
End: 2022-01-28
Payer: COMMERCIAL

## 2022-01-28 NOTE — TELEPHONE ENCOUNTER
Recent labs and clinic visit notes sent to Dr. Hughes.  ----- Message from Lorin Retana MD sent at 1/27/2022  1:50 PM CST -----  Please share results with her home nephrologist, Dr. Hughes

## 2022-02-02 ENCOUNTER — TELEPHONE (OUTPATIENT)
Dept: TRANSPLANT | Facility: CLINIC | Age: 62
End: 2022-02-02
Payer: COMMERCIAL

## 2022-02-02 NOTE — TELEPHONE ENCOUNTER
Voice message left for callback.  ----- Message from Angelic Spence RN sent at 2/1/2022  4:33 PM CST -----  Regarding: FW: labs    ----- Message -----  From: Nestor Blank  Sent: 2/1/2022   2:31 PM CST  To: [facility]  Subject: labs                                             Anette with home health  call in regards to pt labs order    Call

## 2022-03-09 ENCOUNTER — PATIENT MESSAGE (OUTPATIENT)
Dept: TRANSPLANT | Facility: CLINIC | Age: 62
End: 2022-03-09
Payer: COMMERCIAL

## 2022-04-04 ENCOUNTER — PATIENT MESSAGE (OUTPATIENT)
Dept: TRANSPLANT | Facility: CLINIC | Age: 62
End: 2022-04-04
Payer: COMMERCIAL

## 2022-04-25 ENCOUNTER — PATIENT MESSAGE (OUTPATIENT)
Dept: DERMATOLOGY | Facility: CLINIC | Age: 62
End: 2022-04-25
Payer: COMMERCIAL

## 2022-04-29 ENCOUNTER — PATIENT MESSAGE (OUTPATIENT)
Dept: TRANSPLANT | Facility: CLINIC | Age: 62
End: 2022-04-29
Payer: COMMERCIAL

## 2022-05-11 ENCOUNTER — PATIENT MESSAGE (OUTPATIENT)
Dept: RESEARCH | Facility: CLINIC | Age: 62
End: 2022-05-11
Payer: COMMERCIAL

## 2022-06-01 ENCOUNTER — PATIENT MESSAGE (OUTPATIENT)
Dept: TRANSPLANT | Facility: CLINIC | Age: 62
End: 2022-06-01
Payer: COMMERCIAL

## 2022-06-03 ENCOUNTER — TELEPHONE (OUTPATIENT)
Dept: TRANSPLANT | Facility: CLINIC | Age: 62
End: 2022-06-03
Payer: COMMERCIAL

## 2022-06-03 NOTE — TELEPHONE ENCOUNTER
SHIFT message sent requesting labs on 6/13.  Pt recently sent labs, no LFTs included.  Pt responded and verbalized understanding.

## 2022-06-15 ENCOUNTER — PATIENT MESSAGE (OUTPATIENT)
Dept: TRANSPLANT | Facility: CLINIC | Age: 62
End: 2022-06-15
Payer: COMMERCIAL

## 2022-06-16 LAB
EXT ALBUMIN: 4.2
EXT ALKALINE PHOSPHATASE: 129
EXT ALT: 11
EXT AST: 24
EXT BASOPHIL%: 1.1
EXT BILIRUBIN TOTAL: 0.3
EXT BUN: 32
EXT CALCIUM: 9.1
EXT CHLORIDE: 106
EXT CO2: 22
EXT CREATININE: 2.73 MG/DL
EXT EOSINOPHIL%: 3.7
EXT GLUCOSE: 94
EXT HEMATOCRIT: 32.4
EXT HEMOGLOBIN: 10.6
EXT LYMPH%: 18.6
EXT MONOCYTES%: 10.7
EXT PLATELETS: 362
EXT POTASSIUM: 4.8
EXT PROTEIN TOTAL: 7.4
EXT SEGS%: 65.9
EXT SODIUM: 136 MMOL/L
EXT TACROLIMUS LVL: 4.4
EXT WBC: 4.2

## 2022-06-17 ENCOUNTER — PATIENT MESSAGE (OUTPATIENT)
Dept: TRANSPLANT | Facility: CLINIC | Age: 62
End: 2022-06-17
Payer: COMMERCIAL

## 2022-06-17 ENCOUNTER — TELEPHONE (OUTPATIENT)
Dept: TRANSPLANT | Facility: CLINIC | Age: 62
End: 2022-06-17
Payer: COMMERCIAL

## 2022-06-17 NOTE — LETTER
June 17, 2022    Virginia Lentz  [address] [id number]          Dear Virginia Lentz:  MRN: [id number]    This is a follow up to your recent labs, your lab results were stable.  There are no medicine changes.  Please have your labs drawn again on 11/14/22.      If you cannot have your labs drawn on the scheduled date, it is your responsibility to call the transplant department to reschedule.  Please call [phone] and ask to speak to Gogo MEEK   for all scheduling requests.     Sincerely,      Angelic SERVINN, RN  Your Liver Transplant Coordinator    [facility]  [address] [place] [id number]  [phone]

## 2022-06-17 NOTE — TELEPHONE ENCOUNTER
Stable labs, no medication changes.  Next labs 11/14/22, letter sent.  ----- Message from Ruben Ferrell MD sent at 6/17/2022  9:04 AM CDT -----  Results reviewed

## 2022-06-17 NOTE — TELEPHONE ENCOUNTER
Pt notified via Shut Down.  ----- Message from sIa Rasheed MD sent at 6/16/2022 11:43 AM CDT -----  Kidney function stable. Electrolytes are stable. Tac level acceptable from kidney standpoint.

## 2022-08-08 ENCOUNTER — TELEPHONE (OUTPATIENT)
Dept: TRANSPLANT | Facility: CLINIC | Age: 62
End: 2022-08-08
Payer: COMMERCIAL

## 2022-08-12 ENCOUNTER — TELEPHONE (OUTPATIENT)
Dept: SURGERY | Facility: CLINIC | Age: 62
End: 2022-08-12
Payer: COMMERCIAL

## 2022-08-15 DIAGNOSIS — Z76.82 ORGAN TRANSPLANT CANDIDATE: Primary | ICD-10-CM

## 2022-08-17 ENCOUNTER — TELEPHONE (OUTPATIENT)
Dept: SURGERY | Facility: CLINIC | Age: 62
End: 2022-08-17
Payer: COMMERCIAL

## 2022-08-23 ENCOUNTER — PATIENT MESSAGE (OUTPATIENT)
Dept: TRANSPLANT | Facility: CLINIC | Age: 62
End: 2022-08-23
Payer: COMMERCIAL

## 2022-08-24 RX ORDER — PREDNISONE 5 MG/1
5 TABLET ORAL DAILY
Qty: 30 TABLET | Refills: 11 | Status: SHIPPED | OUTPATIENT
Start: 2022-08-24 | End: 2023-01-01

## 2022-08-25 ENCOUNTER — OFFICE VISIT (OUTPATIENT)
Dept: SURGERY | Facility: CLINIC | Age: 62
End: 2022-08-25
Payer: COMMERCIAL

## 2022-08-25 VITALS — WEIGHT: 94 LBS | HEIGHT: 60 IN | BODY MASS INDEX: 18.46 KG/M2

## 2022-08-25 DIAGNOSIS — K86.0 ALCOHOL-INDUCED CHRONIC PANCREATITIS: Primary | ICD-10-CM

## 2022-08-25 DIAGNOSIS — Z94.4 STATUS POST LIVER TRANSPLANTATION: Chronic | ICD-10-CM

## 2022-08-25 DIAGNOSIS — R63.0 DECREASED APPETITE: ICD-10-CM

## 2022-08-25 DIAGNOSIS — Z94.0 KIDNEY REPLACED BY TRANSPLANT: ICD-10-CM

## 2022-08-25 DIAGNOSIS — D84.9 IMMUNOSUPPRESSION: Chronic | ICD-10-CM

## 2022-08-25 PROBLEM — R21 RASH: Status: RESOLVED | Noted: 2019-11-25 | Resolved: 2022-08-25

## 2022-08-25 PROBLEM — E87.1 HYPONATREMIA: Status: RESOLVED | Noted: 2022-01-24 | Resolved: 2022-08-25

## 2022-08-25 PROBLEM — E83.42 HYPOMAGNESEMIA: Status: RESOLVED | Noted: 2022-01-24 | Resolved: 2022-08-25

## 2022-08-25 PROCEDURE — 99499 NO LOS: ICD-10-PCS | Mod: 95,NTX,, | Performed by: SURGERY

## 2022-08-25 PROCEDURE — 3066F PR DOCUMENTATION OF TREATMENT FOR NEPHROPATHY: ICD-10-PCS | Mod: CPTII,95,NTX, | Performed by: SURGERY

## 2022-08-25 PROCEDURE — 4010F ACE/ARB THERAPY RXD/TAKEN: CPT | Mod: CPTII,95,NTX, | Performed by: SURGERY

## 2022-08-25 PROCEDURE — 3066F NEPHROPATHY DOC TX: CPT | Mod: CPTII,95,NTX, | Performed by: SURGERY

## 2022-08-25 PROCEDURE — 4010F PR ACE/ARB THEARPY RXD/TAKEN: ICD-10-PCS | Mod: CPTII,95,NTX, | Performed by: SURGERY

## 2022-08-25 PROCEDURE — 99499 UNLISTED E&M SERVICE: CPT | Mod: 95,NTX,, | Performed by: SURGERY

## 2022-08-25 NOTE — PROGRESS NOTES
The patient location is: home  The chief complaint leading to consultation is: pancreatitis with severe abd pain    Visit type: audiovisual      Each patient to whom he or she provides medical services by telemedicine is:  (1) informed of the relationship between the physician and patient and the respective role of any other health care provider with respect to management of the patient; and (2) notified that he or she may decline to receive medical services by telemedicine and may withdraw from such care at any time.    Encounter Date:  2022    Patient ID: Virginia Lentz  Age:  62 y.o. :  1960     Chief Complaint   Patient presents with    Consult     NP Pancreatitis      History:    Ms. Lentz is a 62 y.o. female who presents with chronic pancreatitis. She remains at home in [place], [place] with her .    Reviewed outside medical records and imaging.   She was diagnosed with pancreatitis ~ 12 years ago, suffered 2 bouts prior to transplantation. 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 Mrs. Lentz has experienced intermittent, chronic nausea and diarrhea since transplant which she attributes to medication. She denies any issues with pancreatitis until April/May of this year. Since then, she has had severe abd pain, decreased appetite and lost 20#. She has N/V/D with abd pain 2-3 days/ week. Current weight 94-97#. She is eating 5 small meals of soft, bland foods plus drinks Ensure daily. She started PERT in May 2022, prescribed by local GI Dr. Allen, and she reports taking 1 capsule with all her small meals.     Prior abd surgery: appendectomy, S/p liver/ kidney transplant 2011  Not currently taking anticoagulation  Never smoked    Data:     Radiology:  Dr. José Aguilera personally reviewed these images:   22: CT A/P      2022: MRI      2022: Endoscopy:          Labs:    Lab Results    Component Value Date    WBC 8.88 2022    HGB 10.0 (L) 2022    HCT 30.7 (L) 2022     (H) 2022     2022       Chemistry        Component Value Date/Time     2022 1115    K 5.0 2022 1115     2022 1115    CO2 23 2022 1115    BUN 47 (H) 2022 1115    CREATININE 2.8 (H) 2022 1115    GLU 80 2022 1115        Component Value Date/Time    CALCIUM 9.2 2022 1115    ALKPHOS 85 2022 1106    AST 11 2022 1106    ALT 6 (L) 2022 1106    BILITOT 0.2 2022 1106    ESTGFRAFRICA 20.0 (A) 2022 1115    EGFRNONAA 17.3 (A) 2022 1115          Past Medical History:   Diagnosis Date    Abnormal Pap smear of cervix     ARSEN (acute kidney injury) 2019    Alcoholic cirrhosis 7/10/2012    CKD (chronic kidney disease) stage 3, GFR 30-59 ml/min     COVID-19     -donor kidney transplant 7/10/2012    Essential hypertension 10/22/2018    H/O recurrent pneumonia 10/22/2018    H/O recurrent pneumonia 10/22/2018    Hypocitraturic calcium nephrolithiasis 2013 24 urine showed low urine citrate.    Hypothyroidism 7/10/2012    Liver replaced by transplant 7/10/2012    Need for prophylactic immunotherapy 7/10/2012    Pancreatitis  Aug 2011    Pulmonary embolism 10/22/2018    Pulmonary embolism 10/22/2018    Recurrent nephrolithiasis 7/10/2012    Recurrent pneumonia     last episode 2018     Past Surgical History:   Procedure Laterality Date    APPENDECTOMY      AUGMENTATION OF BREAST       SECTION      KIDNEY TRANSPLANT      LIVER TRANSPLANT      RENAL BIOPSY N/A 2019    Procedure: BIOPSY, KIDNEY;  Surgeon: Dosc Diagnostic Provider;  Location: [facility] OR [address] [place] [address];  Service: Anesthesiology;  Laterality: N/A;  pt instructed to report to 2nd floor @ 10:30; Report to DOSC @12:00 PM, biopsy @ 2:00 PM    TONSILLECTOMY      TRACHEOSTOMY CLOSURE  Mar 2012    TRACHEOSTOMY TUBE PLACEMENT        Current Outpatient Medications on File Prior to Visit   Medication Sig Dispense Refill    acetylcysteine 200 mg/ml, 20%, (MUCOMYST) 200 mg/mL (20 %) nebulizer solution Take 4 mLs by nebulization 2 (two) times daily. 30 mL 3    albuterol (PROVENTIL) 2.5 mg /3 mL (0.083 %) nebulizer solution albuterol sulfate 2.5 mg/3 mL (0.083 %) solution for nebulization      albuterol (PROVENTIL/VENTOLIN HFA) 90 mcg/actuation inhaler ProAir HFA 90 mcg/actuation aerosol inhaler      albuterol-ipratropium (DUO-NEB) 2.5 mg-0.5 mg/3 mL nebulizer solution Take 3 mLs by nebulization every 6 (six) hours while awake. 1 Box 0    ALPRAZolam (XANAX) 0.5 MG tablet Take 0.5 mg by mouth 2 (two) times a day.      amLODIPine (NORVASC) 10 MG tablet Take 1 tablet (10 mg total) by mouth once daily. 30 tablet 11    aspirin (ECOTRIN) 81 MG EC tablet Take 81 mg by mouth Daily. 1 Tablet, Delayed Release (E.C.) Oral Every day.        biotin 500 mcg Cap Take 1 mg by mouth Daily. 1  Oral Every day      calcitRIOL (ROCALTROL) 0.25 MCG Cap Take 2 capsules (0.5 mcg total) by mouth once daily. 60 capsule 11    COMP-AIR NEBULIZER COMPRESSOR [place] use as directed      cyanocobalamin (VITAMIN B-12) 1000 MCG tablet Take 1 tablet (1,000 mcg total) by mouth once daily. 30 tablet 5    ergocalciferol (ERGOCALCIFEROL) 50,000 unit Cap Take 1 capsule (50,000 Units total) by mouth every 30 days. 3 capsule 3    famotidine (PEPCID) 20 MG tablet Take 1 tablet (20 mg total) by mouth 2 (two) times daily. 60 tablet 11    gabapentin (NEURONTIN) 300 MG capsule Take 300 mg by mouth daily as needed. Patient currently not taking with last admin ~4 weeks ago. Would like to keep on board in case of severe nerve pain.      levETIRAcetam (KEPPRA) 250 MG Tab Take 250 mg by mouth 2 (two) times daily.      levETIRAcetam (KEPPRA) 500 MG Tab Take 500 mg by mouth 2 (two) times daily.      levothyroxine (SYNTHROID) 50 MCG tablet levothyroxine 50 mcg tablet      loperamide (IMODIUM) 2 mg  capsule Take 2 mg by mouth daily as needed. 1 Capsule Oral       losartan (COZAAR) 25 MG tablet Take 25 mg by mouth once daily.      magnesium oxide (MAG-OX) 400 mg tablet Take 2 tablets by mouth 2 (two) times daily.  0    mycophenolate (MYFORTIC) 180 MG TbEC Take 2 tablets (360 mg total) by mouth 2 (two) times daily. 120 tablet 11    NEXIUM 40 mg capsule Take 40 mg by mouth 2 (two) times daily.   0    ondansetron (ZOFRAN-ODT) 8 MG TbDL Take 8 mg by mouth Use as needed. 1 Tablet, Rapid Dissolve Oral Every 12 hours      predniSONE (DELTASONE) 5 MG tablet Take 1 tablet (5 mg total) by mouth once daily. 30 tablet 11    promethazine (PHENERGAN) 12.5 MG Tab Take 12.5-25 mg by mouth.      sodium chloride 1 gram tablet Take 2 tablets (2 g total) by mouth 2 (two) times a day. 120 tablet 6    tacrolimus (PROGRAF) 0.5 MG Cap Take 3 capsules (1.5 mg total) by mouth every 12 (twelve) hours. 180 capsule 11    tacrolimus (PROGRAF) 1 MG Cap TAKE 1 CAPSULE EVERY 12 HOURS 60 capsule 0    valACYclovir (VALTREX) 1000 MG tablet Take 1,000 mg by mouth once daily.      valACYclovir (VALTREX) 500 MG tablet TAKE 1 TABLET DAILY. 30 tablet 0    zolpidem (AMBIEN) 5 MG Tab Take 5 mg by mouth every evening. 1 Tablet Oral At bedtime       No current facility-administered medications on file prior to visit.     Review of patient's allergies indicates:   Allergen Reactions    Levaquin [levofloxacin]      Tendon tenderness       Family History:  Her family history includes Breast cancer in her maternal grandmother and mother.     Social History:   reports that she has never smoked. She has never used smokeless tobacco. She reports that she does not drink alcohol.     ROS:     Review of Systems   Constitutional: Positive for appetite change, fatigue and unexpected weight change. Negative for activity change.   HENT: Negative for trouble swallowing.    Eyes: Negative for visual disturbance.        Wears glasses   Respiratory: Negative for shortness  of breath.    Cardiovascular: Negative for chest pain.   Gastrointestinal: Positive for abdominal pain, diarrhea, nausea and vomiting.   Skin: Negative for color change.   Pertinent positive/negatives detailed in HPI, all other systems negative.     Physical Exam: virtual visit only  Ht 5' (1.524 m)   Wt 42.6 kg (94 lb)   BMI 18.36 kg/m²     Constitutional:  Non-toxic, no acute distress.    Eyes:  Sclerae anicteric, gaze symmetrical  Neck:  Trachea midline, FROM  Resp:  Even and unlabored resp  Neuro:  No gross facial deficits  Psych:  Awake, alert, oriented.  Answers and asks questions appropriately      ICD-10-CM ICD-9-CM    1. Alcohol-induced chronic pancreatitis  K86.0 577.1 FL Upper GI      NM Gastric Emptying      Pancreatic elastase, fecal      Pancreatic elastase, fecal      FL Upper GI      NM Gastric Emptying   2. Decreased appetite  R63.0 783.0    3. Status post liver transplantation  Z94.4 V42.7    4. Kidney replaced by transplant  Z94.0 V42.0    5. Immunosuppression  D84.9 279.9        Ms. Coleman as a tough case of chronic pancreatitis, alcohol induced, in conjunction with previous history of liver kidney failure that required transplant.  She previously had several episodes of severe acute pancreatitis but has not had a significant episode in many many years.  Her overall symptom picture is complicated by her transplant history and polypharmacy.  When I really dig down on her symptoms you can divide them into 2 categories:  Functional GI and pain.    Her functional GI symptoms have really been present since transplant.  She has chronic nausea and occasionally low volume emesis, nonbilious.  She has been battling diarrhea for many years although she does feel like that is gotten a little bit worse over the past 6 months.  Her transplant team was made some accommodations with her medications without much effect.  She is taken Imodium before with some improvement but does not take it regularly.  By  "report she had a fecal elastase study and was started on pancreatic enzyme replacement therapy about 3 months ago.  She does take it pretty religiously and has not seen much of an impact.  Her dosing sounds a little bit low.      The pain component is a newer problem.    We spent a good deal of time discussing the chronic nature of pancreatitis and the goals of therapy.  This is not a "curable" state and the treatment really revolves around managing symptoms to preserve quality of life.  I have reviewed her outside CT and MRCP imaging.  While her pancreatic duct is a bit dilated in the neck her symptoms are not really related to obstruction and acute recurrent pancreatitis.  The entire gland is extraordinarily calcified and fibrotic and I suspect most of her symptoms are related to insufficiency.      Plan:    Maximize PERT therapy, repeat fecal elastase. Obtain Upper GI and NM gastric emptying. She may do these tests closer to home. Will present at next panc cyst conf. In the end I suspect our best route here is to maximize her medical therapy and focus on incremental improvement in her QOL.       José Aguilera MD  Upper GI / Hepatobiliary Surgical Oncology  [facility] [place], [place]  Office: [phone]  Fax: [phone]     "

## 2022-08-26 ENCOUNTER — TELEPHONE (OUTPATIENT)
Dept: SURGERY | Facility: CLINIC | Age: 62
End: 2022-08-26
Payer: COMMERCIAL

## 2022-08-26 ENCOUNTER — PATIENT MESSAGE (OUTPATIENT)
Dept: TRANSPLANT | Facility: CLINIC | Age: 62
End: 2022-08-26
Payer: COMMERCIAL

## 2022-08-26 ENCOUNTER — TELEPHONE (OUTPATIENT)
Dept: TRANSPLANT | Facility: CLINIC | Age: 62
End: 2022-08-26
Payer: COMMERCIAL

## 2022-08-26 RX ORDER — PANCRELIPASE 36000; 180000; 114000 [USP'U]/1; [USP'U]/1; [USP'U]/1
3 CAPSULE, DELAYED RELEASE PELLETS ORAL
Qty: 270 CAPSULE | Refills: 2 | Status: SHIPPED | OUTPATIENT
Start: 2022-08-26 | End: 2022-09-25

## 2022-08-26 NOTE — TELEPHONE ENCOUNTER
Call placed to pt regarding scheduling upcoming testing (Upper GI/gastric emptying scan, repeat fecal elastase ) and 1 mon VV with JUAN Watkins. Pt states that there is an [facility] facility close by ([facility]) and would prefer afternoon appts if available. Pt also notified that Creon dose will be increased and ordered today per JUAN Sherman.

## 2022-08-26 NOTE — TELEPHONE ENCOUNTER
Called pt and notified her of Gastric emptying study scheduled on 9/6 with NPO status prior to testing. Also notified pt of f/u vv with Dr. Aguilera on 10/6. Upper GI in process of scheduling, pt aware. Will update pt when test date is available.

## 2022-08-29 ENCOUNTER — TELEPHONE (OUTPATIENT)
Dept: ENDOSCOPY | Facility: HOSPITAL | Age: 62
End: 2022-08-29
Payer: COMMERCIAL

## 2022-08-29 ENCOUNTER — TELEPHONE (OUTPATIENT)
Dept: SURGERY | Facility: CLINIC | Age: 62
End: 2022-08-29
Payer: COMMERCIAL

## 2022-08-29 NOTE — TELEPHONE ENCOUNTER
Call placed to pt. Informed of time date location and NPO instructions for upper GI on 9/7. Reminded of appt details for gastric emptying test on 9/6. Informed pt to stop at lab on 9/6 or 9/7 to obtain supplies for home stool collect for fecal elastase. Instructed she needs to notify the office when she begins new dose of creon and stool collection will be 3 weeks after the start of new creon. Pt will bring stool specimen to the lab - no appt needed. Pt verbalized understanding to all of the above and instructed to contact the office with any questions.

## 2022-08-30 ENCOUNTER — DOCUMENTATION ONLY (OUTPATIENT)
Dept: GASTROENTEROLOGY | Facility: CLINIC | Age: 62
End: 2022-08-30
Payer: COMMERCIAL

## 2022-08-30 DIAGNOSIS — K52.9 CHRONIC DIARRHEA: Primary | ICD-10-CM

## 2022-08-30 NOTE — PROGRESS NOTES
[facility]    Date: 08/30/2022      Diagnosis:  []Pancreatic Cyst []PD Dilation []Peripancreatic Fluid Collection    []Pancreatic Mass []PD Stricture  []Biliary Stricture    []Chronic Pancreatitis  []Acute/Acute Recurrent/ Idiopathic Pancreatitis  []Other:        Presenter    []Asif Jeffries  []Anthony Harrington  []Harley Schmidt   []Zainjodi Bright Chaficarolyn []Donaldo Warren []Abdulhameed Al-Sabban   [x]José Aguilera []Anthony Aguilera  []Anthony Du   []Aundrea Fong         Collaborators  []Asif Jeffries  []Anthony Harrington  []Harley Schmidt   [x]Zain El Chafic []Donaldo Warren [x]Abdulhameed Al-Sabban   [x]José Aguilera [x]Anthony Aguilera  []Anthony Du   [x]Aundrea CLEARY    Virginia Lentz is a 62 y.o. female with a PMH of chronic pancreatitis first diagnosed 12 years ago. In 2011, she underwent combined liver/ kidney transplant for ETOH-related cirrhosis and remains on chronic immunosuppression. Patient suffers from chronic nausea and diarrhea.     Previous Imaging/Procedures  CTAP 4/11/22: findings compatible with chronic pancreatitis    EUS 5/22/22- 34 x 12mm lesion to genu of pancreas, not in communication with PD.       After review of pertinent history and imaging, the group consensus for the plan of care for Virginia Lentz shall include the following recommendations:    Follow up in 1 month for:    Imaging  []MRI MRCP W Contrast []CT Abd W Contrast  []UGI    []MRI MRCP WO Contrast []CT Abd WO Contrast [] Other:      Procedures    []EUS   []ERCP  []IR for:     []Other:        Clinical Consultation   [][facility] [x]Surgery Clinic []IR   []Pain Management [x]Other: General GI      Consider celiac block for persistent pain. Fecal Elastase pending collection.

## 2022-08-30 NOTE — TELEPHONE ENCOUNTER
----- Message from Jess Cha RN sent at 8/26/2022  3:32 PM CDT -----  Regarding: Panc cyst conference  Dr. ERON Aguilera would like to add above pt to panc cyst conference. I wasn't sure if you've been contacted about it yet?    CHARY Mercado

## 2022-09-01 ENCOUNTER — TELEPHONE (OUTPATIENT)
Dept: ENDOSCOPY | Facility: HOSPITAL | Age: 62
End: 2022-09-01
Payer: COMMERCIAL

## 2022-09-01 ENCOUNTER — PATIENT MESSAGE (OUTPATIENT)
Dept: SURGERY | Facility: CLINIC | Age: 62
End: 2022-09-01
Payer: COMMERCIAL

## 2022-09-02 NOTE — TELEPHONE ENCOUNTER
----- Message from Martita Tello RN sent at 9/1/2022 11:07 AM CDT -----  Dr Willy Bolanos saw this pt on 8/25 and would like her seen in panc cyst clinic.    Thank you,    Geovanna

## 2022-09-23 ENCOUNTER — TELEPHONE (OUTPATIENT)
Dept: SURGERY | Facility: CLINIC | Age: 62
End: 2022-09-23
Payer: COMMERCIAL

## 2022-09-23 NOTE — TELEPHONE ENCOUNTER
Call placed to pt. Inquired if she delivered stool specimen. Pt indicated she has not dropped off stool specimen nor picked up supplies to collect specimen. Pt will do this beginning of next week.

## 2022-09-25 ENCOUNTER — PATIENT MESSAGE (OUTPATIENT)
Dept: SURGERY | Facility: CLINIC | Age: 62
End: 2022-09-25
Payer: COMMERCIAL

## 2022-09-26 ENCOUNTER — TELEPHONE (OUTPATIENT)
Dept: SURGERY | Facility: CLINIC | Age: 62
End: 2022-09-26
Payer: COMMERCIAL

## 2022-09-26 ENCOUNTER — TELEPHONE (OUTPATIENT)
Dept: TRANSPLANT | Facility: CLINIC | Age: 62
End: 2022-09-26
Payer: COMMERCIAL

## 2022-09-26 ENCOUNTER — PATIENT MESSAGE (OUTPATIENT)
Dept: TRANSPLANT | Facility: CLINIC | Age: 62
End: 2022-09-26
Payer: COMMERCIAL

## 2022-09-26 NOTE — TELEPHONE ENCOUNTER
Call placed to pt spouse. Pt spouse reports pt dropped off stool specimen to a lab near [facility] but specimen was not labeled when handed over to . Spoke to multiple people in the lab including Marta and Rosita at [facility]. They do not show the specimen was collected but can see the lab order. Return call placed to pt spouse. Detailed instructions provided that he needs to  collection supplies from 2nd floor lab at [facility], deliver specimen to same location and ensure specimen is labeled in front of pt. If they run into any issues at the lab, pt is to request to speak to a supervisor. Pt spouse verbalized understanding.

## 2022-09-26 NOTE — TELEPHONE ENCOUNTER
Returned call. Spoke to pt spouse. Shorty reported he picked up the supplies for stool specimen. Instructions provided for drop off of specimen. Asked Shorty to call the office if they run into any issues. He will bring specimen today. Pt spouse verbalized understanding.

## 2022-09-26 NOTE — TELEPHONE ENCOUNTER
----- Message from Bettie Foster MA sent at 9/23/2022  4:31 PM CDT -----  Regarding: Call back  Contact: Shorty    Who Called: Pt's -Shorty    Would the patient rather a call back or a response via [facility]? Call Back  Best Call Back Number: [phone]  Additional Information: Pt's  Shorty is wondering if they can come  lab testing for stool. She had an upper GI done with Provider José Aguilera from [place]. They are wondering where exactly they go to pick the testing tools.Please contact them about this matter. Thank You         ----- Message -----  From: Minoo Kay  Sent: 9/23/2022   3:27 PM CDT  To: [facility] Clinical Support Staff    Type:  Needs Medical Advice    Who Called: Pt's -Shorty    Would the patient rather a call back or a response via [facility]? Call Back  Best Call Back Number: [phone]  Additional Information: Pt's  Shorty is wondering if they can come  lab testing for stool. She had an upper GI done with Provider José Aguilera from [place]. They are wondering where exactly they go to pick the testing tools.Please contact them about this matter. Thank You

## 2022-09-29 ENCOUNTER — HOSPITAL ENCOUNTER (OUTPATIENT)
Dept: RADIOLOGY | Facility: HOSPITAL | Age: 62
Discharge: HOME | End: 2022-09-29
Attending: NURSE PRACTITIONER
Payer: COMMERCIAL

## 2022-09-29 ENCOUNTER — OFFICE VISIT (OUTPATIENT)
Dept: TRANSPLANT | Facility: CLINIC | Age: 62
End: 2022-09-29
Payer: COMMERCIAL

## 2022-09-29 VITALS
HEART RATE: 100 BPM | HEIGHT: 61 IN | BODY MASS INDEX: 17.94 KG/M2 | RESPIRATION RATE: 16 BRPM | OXYGEN SATURATION: 96 % | SYSTOLIC BLOOD PRESSURE: 110 MMHG | DIASTOLIC BLOOD PRESSURE: 68 MMHG | TEMPERATURE: 97 F | WEIGHT: 95 LBS

## 2022-09-29 DIAGNOSIS — F32.A DEPRESSIVE DISORDER: ICD-10-CM

## 2022-09-29 DIAGNOSIS — I10 ESSENTIAL HYPERTENSION: ICD-10-CM

## 2022-09-29 DIAGNOSIS — Z76.82 ORGAN TRANSPLANT CANDIDATE: ICD-10-CM

## 2022-09-29 DIAGNOSIS — Z94.4 STATUS POST LIVER TRANSPLANTATION: Chronic | ICD-10-CM

## 2022-09-29 DIAGNOSIS — D63.1 ANEMIA OF RENAL DISEASE: ICD-10-CM

## 2022-09-29 DIAGNOSIS — N18.4 CHRONIC KIDNEY DISEASE (CKD), ACTIVE MEDICAL MANAGEMENT WITHOUT DIALYSIS, STAGE 4 (SEVERE): ICD-10-CM

## 2022-09-29 DIAGNOSIS — D84.9 IMMUNOSUPPRESSION: Chronic | ICD-10-CM

## 2022-09-29 DIAGNOSIS — F41.9 ANXIETY: ICD-10-CM

## 2022-09-29 DIAGNOSIS — N18.9 ANEMIA OF RENAL DISEASE: ICD-10-CM

## 2022-09-29 DIAGNOSIS — Z94.0 KIDNEY REPLACED BY TRANSPLANT: ICD-10-CM

## 2022-09-29 DIAGNOSIS — G40.909 SEIZURE DISORDER: ICD-10-CM

## 2022-09-29 DIAGNOSIS — K86.1 CHRONIC PANCREATITIS, UNSPECIFIED PANCREATITIS TYPE: ICD-10-CM

## 2022-09-29 DIAGNOSIS — Z01.818 PRE-TRANSPLANT EVALUATION FOR CHRONIC KIDNEY DISEASE: Primary | ICD-10-CM

## 2022-09-29 DIAGNOSIS — G89.4 CHRONIC PAIN SYNDROME: ICD-10-CM

## 2022-09-29 DIAGNOSIS — F32.89 OTHER DEPRESSION: ICD-10-CM

## 2022-09-29 PROCEDURE — 3074F SYST BP LT 130 MM HG: CPT | Mod: CPTII,S$GLB,TXP, | Performed by: NURSE PRACTITIONER

## 2022-09-29 PROCEDURE — 72170 X-RAY EXAM OF PELVIS: CPT | Mod: TC,TXP

## 2022-09-29 PROCEDURE — 1159F PR MEDICATION LIST DOCUMENTED IN MEDICAL RECORD: ICD-10-PCS | Mod: CPTII,S$GLB,TXP, | Performed by: NURSE PRACTITIONER

## 2022-09-29 PROCEDURE — 99999 PR PBB SHADOW E&M-EST. PATIENT-LVL V: ICD-10-PCS | Mod: PBBFAC,TXP,, | Performed by: NURSE PRACTITIONER

## 2022-09-29 PROCEDURE — 1160F RVW MEDS BY RX/DR IN RCRD: CPT | Mod: CPTII,S$GLB,TXP, | Performed by: NURSE PRACTITIONER

## 2022-09-29 PROCEDURE — 3066F PR DOCUMENTATION OF TREATMENT FOR NEPHROPATHY: ICD-10-PCS | Mod: CPTII,S$GLB,TXP, | Performed by: NURSE PRACTITIONER

## 2022-09-29 PROCEDURE — 97802 PR MED NUTR THER, 1ST, INDIV, EA 15 MIN: ICD-10-PCS | Mod: S$GLB,TXP,,

## 2022-09-29 PROCEDURE — 3008F PR BODY MASS INDEX (BMI) DOCUMENTED: ICD-10-PCS | Mod: CPTII,S$GLB,TXP, | Performed by: NURSE PRACTITIONER

## 2022-09-29 PROCEDURE — 99215 PR OFFICE/OUTPT VISIT, EST, LEVL V, 40-54 MIN: ICD-10-PCS | Mod: S$GLB,TXP,, | Performed by: NURSE PRACTITIONER

## 2022-09-29 PROCEDURE — 76770 US EXAM ABDO BACK WALL COMP: CPT | Mod: TC,TXP,59

## 2022-09-29 PROCEDURE — 99215 OFFICE O/P EST HI 40 MIN: CPT | Mod: S$GLB,TXP,, | Performed by: NURSE PRACTITIONER

## 2022-09-29 PROCEDURE — 71046 XR CHEST PA AND LATERAL: ICD-10-PCS | Mod: 26,TXP,, | Performed by: RADIOLOGY

## 2022-09-29 PROCEDURE — 4010F PR ACE/ARB THEARPY RXD/TAKEN: ICD-10-PCS | Mod: CPTII,S$GLB,TXP, | Performed by: NURSE PRACTITIONER

## 2022-09-29 PROCEDURE — 76776 US EXAM K TRANSPL W/DOPPLER: CPT | Mod: 26,TXP,, | Performed by: RADIOLOGY

## 2022-09-29 PROCEDURE — 76705 US DOPPLER LIVER TRANSPLANT POST (XPD): ICD-10-PCS | Mod: 26,59,TXP, | Performed by: RADIOLOGY

## 2022-09-29 PROCEDURE — 3078F DIAST BP <80 MM HG: CPT | Mod: CPTII,S$GLB,TXP, | Performed by: NURSE PRACTITIONER

## 2022-09-29 PROCEDURE — 76776 US EXAM K TRANSPL W/DOPPLER: CPT | Mod: TC,TXP

## 2022-09-29 PROCEDURE — 76776 US TRANSPLANT KIDNEY WITH DOPPLER: ICD-10-PCS | Mod: 26,TXP,, | Performed by: RADIOLOGY

## 2022-09-29 PROCEDURE — 71046 X-RAY EXAM CHEST 2 VIEWS: CPT | Mod: TC,TXP

## 2022-09-29 PROCEDURE — 71046 X-RAY EXAM CHEST 2 VIEWS: CPT | Mod: 26,TXP,, | Performed by: RADIOLOGY

## 2022-09-29 PROCEDURE — 99214 OFFICE O/P EST MOD 30 MIN: CPT | Mod: S$GLB,TXP,, | Performed by: TRANSPLANT SURGERY

## 2022-09-29 PROCEDURE — 3074F PR MOST RECENT SYSTOLIC BLOOD PRESSURE < 130 MM HG: ICD-10-PCS | Mod: CPTII,S$GLB,TXP, | Performed by: NURSE PRACTITIONER

## 2022-09-29 PROCEDURE — 1159F MED LIST DOCD IN RCRD: CPT | Mod: CPTII,S$GLB,TXP, | Performed by: NURSE PRACTITIONER

## 2022-09-29 PROCEDURE — 72170 XR PELVIS ROUTINE AP: ICD-10-PCS | Mod: 26,TXP,, | Performed by: RADIOLOGY

## 2022-09-29 PROCEDURE — 99214 PR OFFICE/OUTPT VISIT, EST, LEVL IV, 30-39 MIN: ICD-10-PCS | Mod: S$GLB,TXP,, | Performed by: TRANSPLANT SURGERY

## 2022-09-29 PROCEDURE — 93976 US DOPPLER LIVER TRANSPLANT POST (XPD): ICD-10-PCS | Mod: 26,TXP,, | Performed by: RADIOLOGY

## 2022-09-29 PROCEDURE — 76705 ECHO EXAM OF ABDOMEN: CPT | Mod: TC,TXP

## 2022-09-29 PROCEDURE — 3008F BODY MASS INDEX DOCD: CPT | Mod: CPTII,S$GLB,TXP, | Performed by: NURSE PRACTITIONER

## 2022-09-29 PROCEDURE — 99999 PR PBB SHADOW E&M-EST. PATIENT-LVL V: CPT | Mod: PBBFAC,TXP,, | Performed by: NURSE PRACTITIONER

## 2022-09-29 PROCEDURE — 97802 MEDICAL NUTRITION INDIV IN: CPT | Mod: S$GLB,TXP,,

## 2022-09-29 PROCEDURE — 3078F PR MOST RECENT DIASTOLIC BLOOD PRESSURE < 80 MM HG: ICD-10-PCS | Mod: CPTII,S$GLB,TXP, | Performed by: NURSE PRACTITIONER

## 2022-09-29 PROCEDURE — 93976 VASCULAR STUDY: CPT | Mod: 26,TXP,, | Performed by: RADIOLOGY

## 2022-09-29 PROCEDURE — 1160F PR REVIEW ALL MEDS BY PRESCRIBER/CLIN PHARMACIST DOCUMENTED: ICD-10-PCS | Mod: CPTII,S$GLB,TXP, | Performed by: NURSE PRACTITIONER

## 2022-09-29 PROCEDURE — 4010F ACE/ARB THERAPY RXD/TAKEN: CPT | Mod: CPTII,S$GLB,TXP, | Performed by: NURSE PRACTITIONER

## 2022-09-29 PROCEDURE — 76770 US RETROPERITONEAL COMPLETE: ICD-10-PCS | Mod: 26,TXP,, | Performed by: RADIOLOGY

## 2022-09-29 PROCEDURE — 76705 ECHO EXAM OF ABDOMEN: CPT | Mod: 26,59,TXP, | Performed by: RADIOLOGY

## 2022-09-29 PROCEDURE — 93978 VASCULAR STUDY: CPT | Mod: TC,TXP

## 2022-09-29 PROCEDURE — 72170 X-RAY EXAM OF PELVIS: CPT | Mod: 26,TXP,, | Performed by: RADIOLOGY

## 2022-09-29 PROCEDURE — 3066F NEPHROPATHY DOC TX: CPT | Mod: CPTII,S$GLB,TXP, | Performed by: NURSE PRACTITIONER

## 2022-09-29 PROCEDURE — 76770 US EXAM ABDO BACK WALL COMP: CPT | Mod: 26,TXP,, | Performed by: RADIOLOGY

## 2022-09-29 PROCEDURE — 93976 VASCULAR STUDY: CPT | Mod: TC,TXP

## 2022-09-29 RX ORDER — SODIUM BICARBONATE 325 MG/1
325 TABLET ORAL 4 TIMES DAILY
COMMUNITY
End: 2023-01-01

## 2022-09-29 NOTE — PROGRESS NOTES
Transplant Recipient Adult Psychosocial Assessment  Previous organ transplant: [place] [facility]    Virginia Lentz  [address]  [place] [address]  Telephone Information:   Mobile [phone]   Home  [phone] (home)  Work  [phone] (work)  E-mail  [email or web address]    Sex: female  YOB: 1960  Age: 62 y.o.    Encounter Date: 9/29/2022  [place] 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 [phone]    Family/Social Support:   Number of dependents/: pt denies  Marital history:  to Shorty 16 years  Other family dynamics: Previous organ transplant: Kidney[place] [facility] [place] [facility] Pt reports supportive family in place. Pt reports  Shorty Lentz ([place]) was caregiver for previous transplant. Pt reports daughter Hazel Lundberg (ER doctor living in [facility]) and step daughter Elidia Lentz ([place]) will be able to assist with transplant.    Household Composition:  Shorty Lentz, 62 yo , [place], does drive/own car, works self employed [employer]. [phone]    Do you and your caregivers have access to reliable transportation? yes  PRIMARY CAREGIVER: Shorty Lentz, , will be primary caregiver, phone number [phone]     provided in-depth information to patient and caregiver regarding pre- and post-transplant caregiver role.   strongly encourages patient and caregiver to have concrete plan regarding post-transplant care giving, including back-up caregiver(s) to ensure care giving needs are met as needed.    Patient and Caregiver states understanding all aspects of caregiver role/commitment and is able/willing/committed to being caregiver to the fullest extent necessary.    Patient and Caregiver verbalizes  "understanding of the education provided today and caregiver responsibilities.         remains available. Patient and Caregiver agree to contact  in a timely manner if concerns arise.      Able to take time off work without financial concerns: yes.     Additional Significant Others who will Assist with Transplant:  Hazel Lundberg, 41 yo daughter, SHARA, does drive/own car, works full time as ER physician in [facility]. [phone]  Elidia Lentz, 31 yo step daughter, [place], does drive/own car, works full time for family business.     Living Will: yes  Healthcare Power of : yes  Advance Directives on file: <<no information> per medical record.  Verbally reviewed LW/HCPA information.   provided patient with copy of LW/HCPA documents and provided education on completion of forms.    Living Donors: Education and resource information given to patient.    Highest Education Level: High School (9-12) or GED  Reading Ability: 12th grade  Reports difficulty with: pt reports wears glasses. Pt denies any problems learning new information.  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  Patient reports last job held was worked for family business until becoming too ill to work.    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    Disabled: no    Monthly Income:  "$25,000 plus" per month  Able to afford all costs now and if transplanted, including medications: yes Pt reports obtains transplant medicines from [facility] and regular medicines from [employer].  Patient and Caregiver verbalizes understanding of personal responsibilities related to transplant costs and the importance of having a financial plan to ensure that patients transplant costs are fully covered.       provided " fundraising information/education. Patient and Caregiververbalizes understanding.   remains available.    Insurance:   Payer/Plan Subscr  Sex Relation Sub. Ins. ID Effective Group Num   1. BLUE CROSS BL* CRISTIAN RICHARDS 1959 Male Spouse [id number] 10/15/20 [id number]                                   [address] [address]     Primary Insurance (for UNOS reporting): Private Insurance  Secondary Insurance (for UNOS reporting): None  Patient and Caregiver verbalizes clear understanding that patient may experience difficulty obtaining and/or be denied insurance coverage post-surgery. This includes and is not limited to disability insurance, life insurance, health insurance, burial insurance, long term care insurance, and other insurances.      Patient and Caregiver also reports understanding that future health concerns related to or unrelated to transplantation may not be covered by patient's insurance.  Resources and information provided and reviewed.     Patient and Caregiver provides verbal permission to release any necessary information to outside resources for patient care and discharge planning.  Resources and information provided are reviewed.      Dialysis Adherence: Pt reports is not on dialysis at this time.    Infusion Service: patient utilizing? no  Home Health: patient utilizing? no  DME: no  Pulmonary/Cardiac Rehab: pt denies  ADLS:  independent with medication management and self care.    Adherence:  Pt reports having high medical compliance with appointments and instructions within last 3 months. Adherence education and counseling provided.     Per History Section:  Past Medical History:   Diagnosis Date    Abnormal Pap smear of cervix     ARSEN (acute kidney injury) 2019    Alcoholic cirrhosis 07/10/2012    Anemia     Anxiety     CKD (chronic kidney disease) stage 3, GFR 30-59 ml/min     COVID-19     -donor kidney transplant 07/10/2012    Depression     Essential hypertension  10/22/2018    GERD (gastroesophageal reflux disease)     H/O recurrent pneumonia 10/22/2018    H/O recurrent pneumonia 10/22/2018    Hypocitraturic calcium nephrolithiasis 07/23/2013 6/2012 24 urine showed low urine citrate.    Hypothyroidism 07/10/2012    Liver replaced by transplant 07/10/2012    Need for prophylactic immunotherapy 07/10/2012    Pancreatitis 08/2011    Pulmonary embolism 10/22/2018    Pulmonary embolism 10/22/2018    Recurrent nephrolithiasis 07/10/2012    Recurrent pneumonia     last episode 7/2018    Seizures      Social History     Tobacco Use    Smoking status: Never    Smokeless tobacco: Never   Substance Use Topics    Alcohol use: No     Social History     Substance and Sexual Activity   Drug Use Never     Social History     Substance and Sexual Activity   Sexual Activity Not Currently       Per Today's Psychosocial:  Tobacco: none, patient denies any use at this time.  Alcohol: none, patient denies any use at this time. Last drink was before 2011 transplant. Pt reports in contact with and is supported by AA friends but not still going to AA meetings at this time. Pt reports Wellbutrin 75 mg prescribed by PCP helps with any feelings of anxiety and depression. Pt reports does use Ambien 5 mg for sleep. Pt denies any need for behavioral health referral at this time.  Illicit Drugs/Non-prescribed Medications: none, patient denies any use at this time.    Patient and Caregiver states clear understanding of the potential impact of substance use as it relates to transplant candidacy and is aware of possible random substance screening.  Substance abstinence/cessation counseling, education and resources provided and reviewed.     Arrests/DWI/Treatment/Rehab: patient denies    Psychiatric History:    Mental Health: Pt reports Wellbutrin 75 mg prescribed by PCP helps with any feelings of anxiety and depression. Pt reports does use Ambien 5 mg for sleep. Pt denies any need for behavioral health  referral at this time.  Psychiatrist/Counselor: pt denies  Medications:  Wellbutrin 75 mg and Ambien 5 mg prescribed by PCP  Suicide/Homicide Issues: pt denies  Safety at home: pt reports living in safe home environment with no abuse.    Knowledge: Patient and Caregiver states having clear understanding and realistic expectations regarding the potential risks and potential benefits of organ transplantation and organ donation and agrees to discuss with health care team members and support system members, as well as to utilize available resources and express questions and/or concerns in order to further facilitate the pt informed decision-making.  Resources and information provided and reviewed.    Patient and Caregiver is aware of [facility]'s affiliation and/or partnership with agencies in home health care, LTAC, SNF, [facility] and other hospitals and clinics.    Understanding: Patient and Caregiver reports having a clear understanding of the many lifetime commitments involved with being a transplant recipient, including costs, compliance, medications, lab work, procedures, appointments, concrete and financial planning, preparedness, timely and appropriate communication of concerns, abstinence (ETOH, tobacco, illicit non-prescribed drugs), adherence to all health care team recommendations, support system and caregiver involvement, appropriate and timely resource utilization and follow-through, mental health counseling as needed/recommended, and patient and caregiver responsibilities.  Social Service Handbook, resources and detailed educational information provided and reviewed.  Educational information provided.    Patient and Caregiver also reports current and expected compliance with health care regime and states having a clear understanding of the importance of compliance.      Patient and Caregiver reports a clear understanding that risks and benefits may be involved with organ transplantation and with organ donation.        Patient and Caregiver also reports clear understanding that psychosocial risk factors may affect patient, and include but are not limited to feelings of depression, generalized anxiety, anxiety regarding dependence on others, post traumatic stress disorder, feelings of guilt and other emotional and/or mental concerns, and/or exacerbation of existing mental health concerns.  Detailed resources provided and discussed.      Patient and Caregiver agrees to access appropriate resources in a timely manner as needed and/or as recommended, and to communicate concerns appropriately.  Patient and Caregiver also reports a clear understanding of treatment options available.     Patient and Caregiver received education in a group setting.   reviewed education, provided additional information, and answered questions.    Feelings or Concerns: Pt reports has been organ transplanted before and is familiar with [facility] transplant process. Pt reports high motivation to pursue kidney organ transplant at this time.    Coping: Identify Patient & Caregiver Strategies to [place]:   1. Currently & Pre-transplant - family support; wilfred and prayer (Restorationist); listening to music; play with animal   2. At the time of surgery - family support; wilfred and prayer (Restorationist); listening to music; play with animals   3. During post-Transplant & Recovery Period - family support; wilfred and prayer (Restorationist); listening to music;     Goals: Pt reports hope for successful kidney organ transplant so she may not be placed on dialysis and so she can have a healthier life. Patient referred to Vocational Rehabilitation.    Interview Behavior: Patient and Caregiver presents as alert and oriented x 4, pleasant, good eye contact, well groomed, recall good, concentration/judgement good, average intelligence, calm, communicative, cooperative, and asking and answering questions appropriately. Pt's highly supportive  Shorty in session with patient's  permission.         Transplant Social Work - Candidacy  Assessment/Plan:     Psychosocial Suitability: Patient presents as low risk candidate for kidney transplant at this time. Based on psychosocial risk factors, patient presents as low risk, due to patient denying any psychosocial barriers to kidney organ transplant at this time. Pt reports having organ transplant caregiver/transportation plan, medical insurance plan and plan to afford transplant costs all in place .    Recommendations/Additional Comments: Pt reports will be arranging and affording own transplant lodging for [facility] transplant stay post organ transplant. Pt reports her daughter Hazel lives in [facility] and pt's  has enough income ($25,000 per month)  to stay in local hotel post transplant if needed.    SW recommends that pt conduct fundraising to assist pt with pay for cost of medications, food, gas, and other transplant related needs. SW recommends that pt remain aware of potential mental health concerns and contact the team if any concerns arise. SW recommends that pt remain abstinent from tobacco, ETOH, and drug use. SW supports pt's continued adherence. SW remains available to answer any questions or concerns that arise as the pt moves through the transplant process.      Final determination of transplant candidacy will be reviewed by the selection committee.      Silvia Bill MSW LCSW

## 2022-09-29 NOTE — PROGRESS NOTES
Transplant Surgery  Kidney Transplant Recipient Evaluation    Referring Physician: Hill Bowden  Current Nephrologist: Hill Hughes    Subjective:     Reason for Visit: evaluate transplant candidacy    History of Present Illness: Virginia Lentz is a 62 y.o. year old female undergoing transplant evaluation.    Dialysis History: Virginia is pre-dialysis.      Transplant History: 11/29/2011 (Kidney), 11/29/2011 (Liver)    Etiology of Renal Disease: Other, Specify - Hypotension (based on medical records from referral).    External provider notes reviewed: Yes    Review of Systems  Objective:     Physical Exam:  Constitutional:   Vitals reviewed: yes   Well-nourished and well-groomed: yes  Eyes:   Sclerae icteric: no   Extraocular movements intact: yes  GI:    Bowel sounds normal: yes   Tenderness: no    If yes, quadrant/location: not applicable   Palpable masses: no    If yes, quadrant/location: not applicable   Hepatosplenomegaly: no   Ascites: no   Hernia: no    If yes, type/location: not applicable   Surgical scars: yes    If yes, type/location: bilateral subcostal  left kidney transplant  not applicable  Resp:   Effort normal: yes   Breath sounds normal: yes    CV:   Regular rate and rhythm: yes   Heart sounds normal: yes   Femoral pulses normal: yes   Extremities edematous: no  Skin:   Rashes or lesions present: no    If yes, describe:not applicable   Jaundice:: no    Musculoskeletal:   Gait normal: yes   Strength normal: yes  Psych:   Oriented to person, place, and time: yes   Affect and mood normal: yes    Additional comments: not applicable    Diagnostics:  The following labs have been reviewed: CBC  CMP  The following radiology images have been independently reviewed and interpreted: CT Abd/Pelvis    Counseling: We provided Virginia Lentz with a group education session today.  We discussed kidney transplantation at length with her, including risks, potential complications, and  alternatives in the management of her renal failure.  The discussion included complications related to anesthesia, bleeding, infection, primary nonfunction, and ATN.  I discussed the typical postoperative course, length of hospitalization, the need for long-term immunosuppression, and the need for long-term routine follow-up.  I discussed living-donor and -donor transplantation and the relative advantages and disadvantages of each.  I also discussed average waiting times for both living donation and  donation.  I discussed national and center-specific survival rates.  I also mentioned the potential benefit of multicenter listing to candidates listed with centers within more than one organ procurement organization.  All questions were answered.    Patient advised that it is recommended that all transplant candidates, and their close contacts and household members receive Covid vaccination.    Final determination of transplant candidacy will be made once evaluation is complete and reviewed by the Kidney & Kidney/Pancreas Selection Committee.    Coronavirus disease (COVID-19) caused by severe acute respiratory virus coronavirus 2 (SARS-C0V 2) is associated with increased mortality in solid organ transplant recipients (SOT) compared to non-transplant patients. Vaccine responses to vaccination are depressed against SARS-CoV2 compared to normal individuals but improve with third vaccination doses. Vaccination prior to SOT provides both the best opportunity for transplant candidates to develop protective immunity and to reduce the risk of serious COVID19 infections post transplantation. Organ transplant candidates at [facility] will be required to receive SARS-CoV-2 vaccination prior to being listed with a an active status, whenever possible. Exceptions will be made for disability related reasons or for sincerely held Restoration beliefs.          Transplant Surgery -  Candidacy   Assessment/Plan:   Virginia Lentz is pre-dialysis with CKD stage 4 (GFR 15-29 mL/min). I see no surgical contraindication to placing a kidney transplant. Based on available information, Virginia Lentz is a suitable kidney transplant candidate.     Additional testing to be completed according to the Written Order Guidelines for Adult Pre-kidney and Pancreas Transplant Evaluation (KI-02).  Interpretation of tests and discussion of patient management involves all members of the multidisciplinary transplant team.    Alex Roe MD

## 2022-09-29 NOTE — PROGRESS NOTES
PHARM.D. PRE-TRANSPLANT NOTE:    This patient's medication therapy was evaluated as part of her pre-transplant evaluation.      The following general pharmacologic concerns were noted: Patient on levetiracetam and levothyroxine, and both should be restarted ASAP post-transplant. Patient a previous liver/kidney transplant currently on Tac/myfortic/Pred    The following concerns for post-operative pain management were noted: None    The following pharmacologic concerns related to HCV therapy were noted: None      This patient's medication profile was reviewed for considerations for DAA Hepatitis C therapy:    [X]  No current inducers of CYP 3A4 or PGP  [X]  No amiodarone on this patient's EMR profile in the last 24 months  [X]  No past or current atrial fibrillation on this patient's EMR profile       Current Outpatient Medications   Medication Sig Dispense Refill    albuterol (PROVENTIL/VENTOLIN HFA) 90 mcg/actuation inhaler ProAir HFA 90 mcg/actuation aerosol inhaler      ALPRAZolam (XANAX) 0.5 MG tablet Take 0.5 mg by mouth 2 (two) times a day.      amLODIPine (NORVASC) 10 MG tablet Take 1 tablet (10 mg total) by mouth once daily. 30 tablet 11    aspirin (ECOTRIN) 81 MG EC tablet Take 81 mg by mouth Daily. 1 Tablet, Delayed Release (E.C.) Oral Every day.        calcitRIOL (ROCALTROL) 0.25 MCG Cap Take 2 capsules (0.5 mcg total) by mouth once daily. 60 capsule 11    COMP-AIR NEBULIZER COMPRESSOR [place] use as directed      cyanocobalamin (VITAMIN B-12) 1000 MCG tablet Take 1 tablet (1,000 mcg total) by mouth once daily. 30 tablet 5    ergocalciferol (ERGOCALCIFEROL) 50,000 unit Cap Take 1 capsule (50,000 Units total) by mouth every 30 days. 3 capsule 3    famotidine (PEPCID) 20 MG tablet Take 1 tablet (20 mg total) by mouth 2 (two) times daily. 60 tablet 11    gabapentin (NEURONTIN) 300 MG capsule Take 300 mg by mouth daily as needed. Patient currently not taking with last admin ~4 weeks ago. Would like to keep on  board in case of severe nerve pain.      levETIRAcetam (KEPPRA) 250 MG Tab Take 250 mg by mouth 2 (two) times daily.      levothyroxine (SYNTHROID) 50 MCG tablet levothyroxine 50 mcg tablet      loperamide (IMODIUM) 2 mg capsule Take 2 mg by mouth daily as needed. 1 Capsule Oral       losartan (COZAAR) 25 MG tablet Take 25 mg by mouth once daily.      magnesium oxide (MAG-OX) 400 mg tablet Take 2 tablets by mouth 2 (two) times daily. Take 2 tablets in the morning, take 1 tablet at night  0    mycophenolate (MYFORTIC) 180 MG TbEC Take 2 tablets (360 mg total) by mouth 2 (two) times daily. 120 tablet 11    NEXIUM 40 mg capsule Take 40 mg by mouth 2 (two) times daily.   0    ondansetron (ZOFRAN-ODT) 8 MG TbDL Take 8 mg by mouth Use as needed. 1 Tablet, Rapid Dissolve Oral Every 12 hours      predniSONE (DELTASONE) 5 MG tablet Take 1 tablet (5 mg total) by mouth once daily. 30 tablet 11    promethazine (PHENERGAN) 12.5 MG Tab Take 12.5-25 mg by mouth daily as needed.      sodium bicarbonate 325 MG tablet Take 325 mg by mouth 4 (four) times daily.      tacrolimus (PROGRAF) 0.5 MG Cap Take 3 capsules (1.5 mg total) by mouth every 12 (twelve) hours. (Patient taking differently: Take by mouth every 12 (twelve) hours. 2mg po QAM and 1.5mg QPM.) 180 capsule 11    valACYclovir (VALTREX) 500 MG tablet TAKE 1 TABLET DAILY. 30 tablet 0    zolpidem (AMBIEN) 5 MG Tab Take 5 mg by mouth every evening. 1 Tablet Oral At bedtime       No current facility-administered medications for this visit.           I am available for consultation and can be contacted, as needed by the other members of the Transplant team.

## 2022-09-29 NOTE — PROGRESS NOTES
INITIAL PATIENT EDUCATION NOTE    Ms. Virginia Lentz was seen in pre-kidney transplant clinic for evaluation for kidney, kidney/pancreas or pancreas only transplant.  The patient attended a an individual video education session that discussed/reviewed the following aspects of transplantation: evaluation and selection committee process, UNOS waitlist management/multiple listings, types of organs offered (KDPI < 85%, KDPI > 85%, PHS risk, DCD, HCV+, HIV+ for HIV+ recipients and enbloc/dual), financial aspects, surgical procedures, dietary instruction pre- and post-transplant, health maintenance pre- and post-transplant, post-transplant hospitalization and outpatient follow-up, potential to participate in a research protocol, and medication management and side effects.  A question and answer session was provided after the presentation.    The patient was seen by all members of the multi-disciplinary team to include: Nephrologist/PA, Surgeon, , Transplant Coordinator, , Pharmacist and Dietician (if applicable).    The patient reviewed and signed all consents for evaluation which were witnessed and sent to scanning into the EPIC chart.    The patient was given an education book and plan for further evaluation based on her individual assessment.      Reviewed program requirement for complete COVID vaccination with documentation prior to listing.  COVID education information reviewed with patient. Patient encouraged to be up to date on all vaccinations.       The patient was informed that the transplant team would manage immediate post op pain. If the patient requires long term pain management, they will need to have that pain management addressed by their PCP or previous provider who wrote for long term pain medicines.    The patient was encouraged to call with any questions or concerns.

## 2022-09-29 NOTE — LETTER
October 3, 2022        Hill Hughes  [address] [facility]  [address]  [facility] [address]  Phone: [phone]  Fax: [phone]     Melissa Garcias  [address]  [address]  [facility] [address]  Phone: [phone]  Fax: [phone]             [address] [address]  [address]  [facility] [address]  Phone: [phone]   Patient: Virginia Lentz   MR Number: [id number]   YOB: 1960   Date of Visit: 9/29/2022       Dear Dr. Melissa Garcias, Hill Hughes    Thank you for referring Virginia Lentz to me for evaluation. Attached you will find relevant portions of my assessment and plan of care.    If you have questions, please do not hesitate to call me. I look forward to following Virginia Lentz along with you.    Sincerely,    Yelena Dutton NP    Enclosure    If you would like to receive this communication electronically, please contact [email or web address] or [phone] to request Ecomsual Link access.    Ecomsual Link is a tool which provides read-only access to select patient information with whom you have a relationship. Its easy to use and provides real time access to review your patients record including encounter summaries, notes, results, and demographic information.    If you feel you have received this communication in error or would no longer like to receive these types of communications, please e-mail [email or web address]

## 2022-09-29 NOTE — PROGRESS NOTES
TRANSPLANT NUTRITIONAL ASSESSMENT    Referring Provider: Hill Hughes MD     Reason for Visit: Pre-kidney transplant work-up (pre-dialysis)    Age: 62 y.o.  Sex: female    Patient Active Problem List   Diagnosis    -donor kidney transplant    Status post liver transplantation    Recurrent nephrolithiasis    Immunosuppression    Hypothyroidism    Hypocitraturic calcium nephrolithiasis    Osteoporosis    Essential hypertension    ARSEN (acute kidney injury)    Kidney dysfunction    Kidney replaced by transplant    History of pulmonary embolism    Anxiety    Bronchiectasis    Chronic pain syndrome    Depressive disorder    Peptic stricture of esophagus    Vitamin D deficiency    Anemia of renal disease    Decreased appetite    Chronic kidney disease (CKD), active medical management without dialysis, stage 4 (severe)    Seizures    Alcohol-induced chronic pancreatitis    Pancreatitis    Depression     Past Medical History:   Diagnosis Date    Abnormal Pap smear of cervix     ARSEN (acute kidney injury) 2019    Alcoholic cirrhosis 07/10/2012    Anemia     Anxiety     CKD (chronic kidney disease) stage 3, GFR 30-59 ml/min     COVID-19     -donor kidney transplant 07/10/2012    Depression     Essential hypertension 10/22/2018    GERD (gastroesophageal reflux disease)     H/O recurrent pneumonia 10/22/2018    H/O recurrent pneumonia 10/22/2018    Hypocitraturic calcium nephrolithiasis 2013 24 urine showed low urine citrate.    Hypothyroidism 07/10/2012    Liver replaced by transplant 07/10/2012    Need for prophylactic immunotherapy 07/10/2012    Pancreatitis 2011    Pulmonary embolism 10/22/2018    Pulmonary embolism 10/22/2018    Recurrent nephrolithiasis 07/10/2012    Recurrent pneumonia     last episode 2018    Seizures      Lab Results   Component Value Date     2022    K 3.4 (L) 2022    PHOS 5.0 (H) 2022    MG 2.0 2022    CHOL 203 (H) 2022     HDL 63 09/29/2022    TRIG 168 (H) 09/29/2022    ALBUMIN 3.7 09/29/2022    PREALBUMIN 20 02/22/2012    AMMONIA 41 11/28/2011    HGBA1C 5.7 12/02/2011    CALCIUM 8.8 09/29/2022     Other Pertinent Labs: N/A    Current Outpatient Medications   Medication Sig    albuterol (PROVENTIL/VENTOLIN HFA) 90 mcg/actuation inhaler ProAir HFA 90 mcg/actuation aerosol inhaler    ALPRAZolam (XANAX) 0.5 MG tablet Take 0.5 mg by mouth 2 (two) times a day.    amLODIPine (NORVASC) 10 MG tablet Take 1 tablet (10 mg total) by mouth once daily.    aspirin (ECOTRIN) 81 MG EC tablet Take 81 mg by mouth Daily. 1 Tablet, Delayed Release (E.C.) Oral Every day.      calcitRIOL (ROCALTROL) 0.25 MCG Cap Take 2 capsules (0.5 mcg total) by mouth once daily.    COMP-AIR NEBULIZER COMPRESSOR Kinga use as directed    cyanocobalamin (VITAMIN B-12) 1000 MCG tablet Take 1 tablet (1,000 mcg total) by mouth once daily.    ergocalciferol (ERGOCALCIFEROL) 50,000 unit Cap Take 1 capsule (50,000 Units total) by mouth every 30 days.    famotidine (PEPCID) 20 MG tablet Take 1 tablet (20 mg total) by mouth 2 (two) times daily.    gabapentin (NEURONTIN) 300 MG capsule Take 300 mg by mouth daily as needed. Patient currently not taking with last admin ~4 weeks ago. Would like to keep on board in case of severe nerve pain.    levETIRAcetam (KEPPRA) 250 MG Tab Take 250 mg by mouth 2 (two) times daily.    levothyroxine (SYNTHROID) 50 MCG tablet levothyroxine 50 mcg tablet    loperamide (IMODIUM) 2 mg capsule Take 2 mg by mouth daily as needed. 1 Capsule Oral     losartan (COZAAR) 25 MG tablet Take 25 mg by mouth once daily.    magnesium oxide (MAG-OX) 400 mg tablet Take 2 tablets by mouth 2 (two) times daily. Take 2 tablets in the morning, take 1 tablet at night    mycophenolate (MYFORTIC) 180 MG TbEC Take 2 tablets (360 mg total) by mouth 2 (two) times daily.    NEXIUM 40 mg capsule Take 40 mg by mouth 2 (two) times daily.     ondansetron (ZOFRAN-ODT) 8 MG TbDL Take  "8 mg by mouth Use as needed. 1 Tablet, Rapid Dissolve Oral Every 12 hours    predniSONE (DELTASONE) 5 MG tablet Take 1 tablet (5 mg total) by mouth once daily.    promethazine (PHENERGAN) 12.5 MG Tab Take 12.5-25 mg by mouth daily as needed.    sodium bicarbonate 325 MG tablet Take 325 mg by mouth 4 (four) times daily.    tacrolimus (PROGRAF) 0.5 MG Cap Take 3 capsules (1.5 mg total) by mouth every 12 (twelve) hours. (Patient taking differently: Take by mouth every 12 (twelve) hours. 2mg po QAM and 1.5mg QPM.)    valACYclovir (VALTREX) 500 MG tablet TAKE 1 TABLET DAILY.    zolpidem (AMBIEN) 5 MG Tab Take 5 mg by mouth every evening. 1 Tablet Oral At bedtime     No current facility-administered medications for this visit.     Allergies: Levaquin [levofloxacin]    Ht Readings from Last 1 Encounters:   22 5' 1.26" (1.556 m)     Wt Readings from Last 1 Encounters:   22 43.1 kg (95 lb 0.3 oz)      BMI: Body mass index is 17.8 kg/m².    Usual Weight: 110-115 lbs  pancreatis   Weight Change/Time: 17% x 5 months, significant   Current Diet: regular   Appetite/Current Intake: improving   Exercise/Physical Activity: functional in ADLs; walking on treadmill 10-15 minutes - trying to do more often   Nutritional/Herbal Supplements: MVI; Ensure 1-2x/day   Chewing/Swallowing Problems: none  Symptoms: occasional N/V/D/C     Estimated Kcal Need: 2193 kcal/day (30 kcal/kg)   Estimated Protein Need: 34-43 gm/day (0.8-1.0 gm/kg)     Nutritional History:   Pt and caregiver present. Pt states that appetite has been improving and has been drinking Ensure to promote wt gain.     Diet Recall    Mornin scrambled eggs (cheese), biscuit (butter)     Midday: turkey sandwich (white bread, plaza, mustard, tomato) with occasional chips OR canned progresso soups     Evening: Enjoys chicken, beef, pork, backed potatoes (butter, cheese, diaz bits), vegetables (mainly asparagus, brussels sprouts, broccoli)     Snacks: " yogurt, grapes, watermelon, strawberries     Desserts: cereal (cheerios with whole milk), ice cream, powdered donuts     Beverages: ice tea, 2 cokes/day, ~5 16.9 oz water bottles/day         Seasonings: salt, pepper     Cooking Methods: bake, saute     Restaurants/Fast Food: 3-4x/wk     Nutritional Diagnoses  Problem: food- and nutrition-related knowledge deficit  Etiology: lack of previous education on pre-kidney transplant nutrition recommendations  Symptoms: diet recall and questions from pt    Educational Need? yes  Barriers: none identified  Discussed with: patient and caregiver  Interventions: Patient taught nutrition information regarding Pre-kidney transplant work-up (pre-dialysis). Renal Nutrition Therapy packet reviewed (high/low food sources of K, Phos and protein, low sodium and fluid intake, emphasis on moderate protein intake). Dicussed tips for weight gain. Encouraged physical activity daily, regular exercise as tolerated, stay mobile.  Goals/Recommendations: diet adherence  Actions Taken: instruct/provide written information  Patient and/or family comprehend instructions: yes  Outcome: Verbalizes understanding  Monitoring: Contact information provided. Will follow-up in clinic and communicate with the care team as needed.    Counseling Time: 20 minutes

## 2022-09-30 ENCOUNTER — TELEPHONE (OUTPATIENT)
Dept: TRANSPLANT | Facility: CLINIC | Age: 62
End: 2022-09-30
Payer: COMMERCIAL

## 2022-09-30 NOTE — LETTER
Date: 9/30/2022          Pre-Dialysis      To: Dialysis Unit  and Charge RN From: [facility] Workers and      Kidney Transplant Nurse Coordinators    RE: Virginia Madridjessica, 1960, [id number]     At [facility], we conduct adherence checks as an important part of transplant care. Initial and listed patient assessments are not complete without adherence information.        Please complete the following information:                 Data from the last 3 months:  (data from last 3 months preferred):    Number of AMAs with dates, time, and reasons: ____________________________________________________    ______________________________________________________________________________________________    ______________________________________________________________________________________________    Number of No-Shows with dates and reasons: ______________________________________________________      ______________________________________________________________________________________________      Any concerns with Caregivers:  YES / NO    If yes, please explain:  ___________________________________________________________________________    ______________________________________________________________________________________________     Any concerns with Transportation:  YES / NO    If yes, please explain:  ___________________________________________________________________________    ______________________________________________________________________________________________    Any Psychiatric and/or Psychosocial concerns:  YES / NO     If yes, please explain: ___________________________________________________________________________    ______________________________________________________________________________________________      PLEASE RETURN TO: FAX: [phone]     Thank you for collaborating with us in the care of this patient.           [id number]  ?  [place] [address]  ?  phone [phone]  ?  fax [phone]  ?  [email or web address]  Confidentiality notice: The accompanying facsimile is intended solely for the use of the recipient designated above. Document(s) transmitted herewith may contain information that is confidential and privileged. Delivery, distribution or dissemination of this communication other than to the intended recipient is strictly prohibited. If you have received this facsimile in error, please notify us immediately by telephone.

## 2022-10-04 ENCOUNTER — PATIENT MESSAGE (OUTPATIENT)
Dept: TRANSPLANT | Facility: CLINIC | Age: 62
End: 2022-10-04
Payer: COMMERCIAL

## 2022-10-04 NOTE — PROGRESS NOTES
Mild Iliac calcifications are present  Prior kidney on left. (Prior Liver/Kidney 2011)  Doppler, XRay and CT from 2022 look ok for kidney on the right.

## 2022-10-05 ENCOUNTER — TELEPHONE (OUTPATIENT)
Dept: TRANSPLANT | Facility: CLINIC | Age: 62
End: 2022-10-05
Payer: COMMERCIAL

## 2022-10-05 NOTE — TELEPHONE ENCOUNTER
Patient's neurologist:  Dr. Kirt Smith   [address] [facility].[address] [place] [address]

## 2022-10-06 ENCOUNTER — OFFICE VISIT (OUTPATIENT)
Dept: SURGERY | Facility: CLINIC | Age: 62
End: 2022-10-06
Payer: COMMERCIAL

## 2022-10-06 DIAGNOSIS — K86.0 ALCOHOL-INDUCED CHRONIC PANCREATITIS: Primary | ICD-10-CM

## 2022-10-06 PROCEDURE — 99499 NO LOS: ICD-10-PCS | Mod: 95,NTX,, | Performed by: SURGERY

## 2022-10-06 PROCEDURE — 1159F PR MEDICATION LIST DOCUMENTED IN MEDICAL RECORD: ICD-10-PCS | Mod: CPTII,95,NTX, | Performed by: SURGERY

## 2022-10-06 PROCEDURE — 99499 UNLISTED E&M SERVICE: CPT | Mod: 95,NTX,, | Performed by: SURGERY

## 2022-10-06 PROCEDURE — 3066F NEPHROPATHY DOC TX: CPT | Mod: CPTII,95,NTX, | Performed by: SURGERY

## 2022-10-06 PROCEDURE — 4010F PR ACE/ARB THEARPY RXD/TAKEN: ICD-10-PCS | Mod: CPTII,95,NTX, | Performed by: SURGERY

## 2022-10-06 PROCEDURE — 1160F PR REVIEW ALL MEDS BY PRESCRIBER/CLIN PHARMACIST DOCUMENTED: ICD-10-PCS | Mod: CPTII,95,NTX, | Performed by: SURGERY

## 2022-10-06 PROCEDURE — 1159F MED LIST DOCD IN RCRD: CPT | Mod: CPTII,95,NTX, | Performed by: SURGERY

## 2022-10-06 PROCEDURE — 3066F PR DOCUMENTATION OF TREATMENT FOR NEPHROPATHY: ICD-10-PCS | Mod: CPTII,95,NTX, | Performed by: SURGERY

## 2022-10-06 PROCEDURE — 4010F ACE/ARB THERAPY RXD/TAKEN: CPT | Mod: CPTII,95,NTX, | Performed by: SURGERY

## 2022-10-06 PROCEDURE — 1160F RVW MEDS BY RX/DR IN RCRD: CPT | Mod: CPTII,95,NTX, | Performed by: SURGERY

## 2022-10-06 NOTE — PROGRESS NOTES
Able to eat more, gaine a couple pounds,  Some continued nausea, occasional vomiting but occasional  4-5 days diarrhea/week instead of multiple times a day  Pain much improved  2-3 boost/ensure daily  Energy and appetite improved

## 2022-10-20 ENCOUNTER — PATIENT MESSAGE (OUTPATIENT)
Dept: TRANSPLANT | Facility: CLINIC | Age: 62
End: 2022-10-20
Payer: COMMERCIAL

## 2022-10-31 ENCOUNTER — PATIENT MESSAGE (OUTPATIENT)
Dept: TRANSPLANT | Facility: CLINIC | Age: 62
End: 2022-10-31
Payer: COMMERCIAL

## 2022-10-31 ENCOUNTER — EPISODE CHANGES (OUTPATIENT)
Dept: TRANSPLANT | Facility: CLINIC | Age: 62
End: 2022-10-31

## 2022-11-08 ENCOUNTER — TELEPHONE (OUTPATIENT)
Dept: TRANSPLANT | Facility: CLINIC | Age: 62
End: 2022-11-08
Payer: COMMERCIAL

## 2022-11-08 NOTE — TELEPHONE ENCOUNTER
Requested I call potential living donor, Zac Ramírez.    ----- Message from Vel Jones sent at 11/8/2022  2:16 PM CST -----  Regarding: Speak to Nurse  Patient spouse called in requesting to speak with nurse Olea. Caller states he has something else he needed to speak with nurse about. Requesting a call back.        Contact: [phone]

## 2022-11-08 NOTE — TELEPHONE ENCOUNTER
Spoke with Shorty about how their family friend may be able to complete living donor survey.  All questions answered.    ----- Message from Ever Taveras sent at 11/8/2022  9:16 AM CST -----  Regarding: speak to coordinator  Patient's , Shorty Lentz, calling to speak to coordinator regarding living donor registration and a question that is blocking the progress.        Call: [phone] (Mobile

## 2022-11-12 DIAGNOSIS — Z76.82 ORGAN TRANSPLANT CANDIDATE: Primary | ICD-10-CM

## 2022-11-15 ENCOUNTER — PATIENT MESSAGE (OUTPATIENT)
Dept: TRANSPLANT | Facility: CLINIC | Age: 62
End: 2022-11-15
Payer: COMMERCIAL

## 2022-11-15 ENCOUNTER — TELEPHONE (OUTPATIENT)
Dept: TRANSPLANT | Facility: CLINIC | Age: 62
End: 2022-11-15
Payer: COMMERCIAL

## 2022-11-15 NOTE — TELEPHONE ENCOUNTER
Spoke with patient's spouse.  Answered questions about living donation and paired donation.  All questions answered at this time.    ----- Message from Sincere Altman sent at 11/15/2022  8:51 AM CST -----  Regarding: Missed call  Patient spouse called in requesting to speak with nurse Emmie. He stated his call is about a potential donor by the name of Vaishali Mehta      Contact Shorty @ [phone]

## 2022-11-18 ENCOUNTER — TELEPHONE (OUTPATIENT)
Dept: TRANSPLANT | Facility: CLINIC | Age: 62
End: 2022-11-18
Payer: COMMERCIAL

## 2022-11-23 ENCOUNTER — TELEPHONE (OUTPATIENT)
Dept: TRANSPLANT | Facility: CLINIC | Age: 62
End: 2022-11-23

## 2022-11-23 LAB
EXT ALBUMIN: 4.4
EXT ALKALINE PHOSPHATASE: 84
EXT ALT: 10
EXT AST: 20
EXT BASOPHIL%: 2.2
EXT BILIRUBIN TOTAL: 0.5
EXT BUN: 64
EXT CALCIUM: 8.3
EXT CHLORIDE: 108
EXT CO2: 18
EXT CREATININE: 4.36 MG/DL
EXT EOSINOPHIL%: 13.4
EXT GLUCOSE: 100
EXT HEMATOCRIT: 31.3
EXT HEMOGLOBIN: 10.4
EXT LYMPH%: 24.4
EXT MONOCYTES%: 12.4
EXT PLATELETS: 342
EXT POTASSIUM: 3.8
EXT PROTEIN TOTAL: 8
EXT SEGS%: 47.6
EXT SODIUM: 134 MMOL/L
EXT TACROLIMUS LVL: 2.1
EXT WBC: 4.8

## 2022-11-23 NOTE — TELEPHONE ENCOUNTER
Pt's  called to notify that pt was taking Azithromycin and one medication for the flu.  She reports notifying the nephrologist, creatinine was 3 on 11/18 per report.  Message routed to Dr. Ferrell.  Pt denies any needs.  ----- Message from Vel Jones sent at 11/23/2022  4:22 PM CST -----  Regarding: Speak to Nurse  Patient spouse called in requesting to speak with nurse Atwood. Reason unspecified. Requesting a call back.          Contact: [phone]

## 2022-11-24 ENCOUNTER — PATIENT MESSAGE (OUTPATIENT)
Dept: TRANSPLANT | Facility: CLINIC | Age: 62
End: 2022-11-24
Payer: COMMERCIAL

## 2022-11-25 ENCOUNTER — TELEPHONE (OUTPATIENT)
Dept: TRANSPLANT | Facility: CLINIC | Age: 62
End: 2022-11-25
Payer: COMMERCIAL

## 2022-11-25 NOTE — TELEPHONE ENCOUNTER
Spoke with the patient's ; Rcv'd outside Neurology and Mammogram; filed; Echo (completed 11/12) Stress test to be completed 12/2 and Cardiology will send over once it is completed. Filed    ----- Message from Bessie Wilcox MA sent at 11/25/2022 12:56 PM CST -----  Regarding: FW: letter out    ----- Message -----  From: Evre Taveras  Sent: 11/25/2022   9:43 AM CST  To: [facility] Pre-Kidney Transplant Non-Clinical  Subject: letter out                                       Patient's , Shorty, calling to speak to Yamila regarding letter out and documents that has already been sent.      Call: [phone] (Mobile)

## 2022-11-28 NOTE — TELEPHONE ENCOUNTER
Called to discuss.  Notified pt that Ashley, nurse w/ Dr. Sandifer, was instructed to call Dr. Hughes for nephrology clearance for procedure.  Pt verbalized understanding.  ----- Message from Vel Jones sent at 11/28/2022  2:40 PM CST -----  Regarding: Patient Health  Patient called in requesting to speak with nurse Atwood regarding some information in reference to her health. She states she is due to have a procedure soon (Wednesday morning) so she wants to run some things by nurse before proceeding.          Contact: [phone]

## 2022-11-30 PROBLEM — N87.0 CERVICAL DYSPLASIA, MILD: Status: ACTIVE | Noted: 2022-01-01

## 2022-11-30 PROBLEM — N87.0 CERVICAL DYSPLASIA, MILD: Chronic | Status: ACTIVE | Noted: 2022-01-01

## 2022-12-01 PROBLEM — R87.612 LGSIL ON PAP SMEAR OF CERVIX: Status: ACTIVE | Noted: 2022-01-01

## 2022-12-01 PROBLEM — Z98.890 STATUS POST LEEP (LOOP ELECTROSURGICAL EXCISION PROCEDURE) OF CERVIX: Status: ACTIVE | Noted: 2022-01-01

## 2022-12-07 NOTE — TELEPHONE ENCOUNTER
----- Message from Ruben Ferrell MD sent at 11/25/2022  8:54 AM CST -----  Creatiine is much higher- can you tell her to see her nephrologist?  Results reviewed

## 2022-12-07 NOTE — TELEPHONE ENCOUNTER
Labs sent to Dr. Rasheed.  Per MD repeat labs, results sent to nephrologist.  Spoke to pt via phone.  Pt reports that she was sick, nephrologist was aware of elevated results.  Agreed to repeat labs next week.  ----- Message from Ruben Ferrell MD sent at 11/25/2022  8:54 AM CST -----  Creatiine is much higher- can you tell her to see her nephrologist?  Results reviewed

## 2022-12-19 NOTE — LETTER
December 19, 2022    Virginia Lentz  [address] [facility] [address]          Dear Virginia Lentz:  MRN: [id number]    This is a follow up to your recent labs, your lab results were stable.  There are no medicine changes.  Please have your labs drawn again on 5/15/23.      If you cannot have your labs drawn on the scheduled date, it is your responsibility to call the transplant department to reschedule.  Please call [phone] and ask to speak to Gogo MEEK   for all scheduling requests.     Sincerely,      Angelic SERVINN, RN  Your Liver Transplant Coordinator    [facility]  [address] [place] [id number]  [phone]

## 2022-12-19 NOTE — TELEPHONE ENCOUNTER
Stable labs, no medication changes.  Next labs 5/15/23, letter sent.  ----- Message from Ruben Ferrell MD sent at 12/2/2022  2:20 PM CST -----  Results reviewed

## 2022-12-21 NOTE — TELEPHONE ENCOUNTER
Returned phone call to Shorty, patient's . Instructed patient's chart is currently being reviewed for completion of necessary appointments and a possible committee date. Shorty reports understanding. Denies other questions or concerns at this time. Reports he will inform his wife.

## 2023-01-01 ENCOUNTER — TELEPHONE (OUTPATIENT)
Dept: TRANSPLANT | Facility: CLINIC | Age: 63
End: 2023-01-01
Payer: COMMERCIAL

## 2023-01-01 ENCOUNTER — LAB VISIT (OUTPATIENT)
Dept: LAB | Facility: HOSPITAL | Age: 63
End: 2023-01-01
Payer: COMMERCIAL

## 2023-01-01 ENCOUNTER — PATIENT MESSAGE (OUTPATIENT)
Dept: TRANSPLANT | Facility: CLINIC | Age: 63
End: 2023-01-01
Payer: COMMERCIAL

## 2023-01-01 ENCOUNTER — OFFICE VISIT (OUTPATIENT)
Dept: TRANSPLANT | Facility: CLINIC | Age: 63
End: 2023-01-01
Payer: COMMERCIAL

## 2023-01-01 ENCOUNTER — EPISODE CHANGES (OUTPATIENT)
Dept: TRANSPLANT | Facility: CLINIC | Age: 63
End: 2023-01-01

## 2023-01-01 ENCOUNTER — COMMITTEE REVIEW (OUTPATIENT)
Dept: TRANSPLANT | Facility: CLINIC | Age: 63
End: 2023-01-01
Payer: COMMERCIAL

## 2023-01-01 VITALS
BODY MASS INDEX: 19.83 KG/M2 | DIASTOLIC BLOOD PRESSURE: 80 MMHG | WEIGHT: 101 LBS | SYSTOLIC BLOOD PRESSURE: 172 MMHG | TEMPERATURE: 97 F | HEIGHT: 60 IN | RESPIRATION RATE: 16 BRPM | OXYGEN SATURATION: 97 % | HEART RATE: 97 BPM

## 2023-01-01 VITALS
OXYGEN SATURATION: 97 % | WEIGHT: 101.44 LBS | BODY MASS INDEX: 19.91 KG/M2 | RESPIRATION RATE: 16 BRPM | HEIGHT: 60 IN | SYSTOLIC BLOOD PRESSURE: 168 MMHG | TEMPERATURE: 97 F | HEART RATE: 82 BPM | DIASTOLIC BLOOD PRESSURE: 79 MMHG

## 2023-01-01 DIAGNOSIS — D84.9 IMMUNOSUPPRESSION: Chronic | ICD-10-CM

## 2023-01-01 DIAGNOSIS — Z76.82 ORGAN TRANSPLANT CANDIDATE: Primary | ICD-10-CM

## 2023-01-01 DIAGNOSIS — Z76.82 PRE-KIDNEY TRANSPLANT, LISTED: ICD-10-CM

## 2023-01-01 DIAGNOSIS — Z76.82 ORGAN TRANSPLANT CANDIDATE: ICD-10-CM

## 2023-01-01 DIAGNOSIS — N18.4 CHRONIC KIDNEY DISEASE (CKD), ACTIVE MEDICAL MANAGEMENT WITHOUT DIALYSIS, STAGE 4 (SEVERE): ICD-10-CM

## 2023-01-01 DIAGNOSIS — Z76.82 PRE-KIDNEY TRANSPLANT, LISTED: Primary | ICD-10-CM

## 2023-01-01 DIAGNOSIS — K85.90 ACUTE PANCREATITIS, UNSPECIFIED COMPLICATION STATUS, UNSPECIFIED PANCREATITIS TYPE: ICD-10-CM

## 2023-01-01 DIAGNOSIS — Z94.4 STATUS POST LIVER TRANSPLANTATION: Chronic | ICD-10-CM

## 2023-01-01 DIAGNOSIS — I10 ESSENTIAL HYPERTENSION: Primary | ICD-10-CM

## 2023-01-01 DIAGNOSIS — Z94.0 KIDNEY REPLACED BY TRANSPLANT: ICD-10-CM

## 2023-01-01 DIAGNOSIS — Z94.0 KIDNEY REPLACED BY TRANSPLANT: Primary | ICD-10-CM

## 2023-01-01 LAB
B CELL RESULTS - XM ALLO: NEGATIVE
B CELL RESULTS - XM ALLO: NEGATIVE
B MCS AVERAGE - XM ALLO: -12.7
B MCS AVERAGE - XM ALLO: -3
CLASS I ANTIBODIES - LUMINEX: NEGATIVE
CLASS II ANTIBODIES - LUMINEX: NEGATIVE
CLASS II ANTIBODY COMMENTS - LUMINEX: NORMAL
CPRA %: 0
DONOR MRN: NORMAL
DONOR MRN: NORMAL
EXT ALBUMIN: 4
EXT ALKALINE PHOSPHATASE: 87
EXT ALT: 15
EXT AST: 18
EXT BASOPHIL%: 2.4
EXT BILIRUBIN TOTAL: 0.4
EXT BUN: 55
EXT CALCIUM: 7.7
EXT CHLORIDE: 106
EXT CO2: 14
EXT CREATININE: 4.21 MG/DL
EXT EGFR NO RACE VARIABLE: 11.2
EXT EOSINOPHIL%: 5.3
EXT GLUCOSE: 86
EXT HEMATOCRIT: 29.9
EXT HEMOGLOBIN: 9.7
EXT LYMPH%: 28.5
EXT MONOCYTES%: 9
EXT PLATELETS: 285
EXT POTASSIUM: 3.6
EXT PROTEIN TOTAL: 7.1
EXT SEGS%: 54.8
EXT SODIUM: 134 MMOL/L
EXT TACROLIMUS LVL: 4.9
EXT WBC: 3.5
FXMAL TESTING DATE: NORMAL
FXMAL TESTING DATE: NORMAL
HLA FLOW CROSSMATCH (ALLO) INTERPRETATION: NORMAL
HLA FREEZE AND HOLD INTERPRETATION: NORMAL
HLAFH COLLECTION DATE: NORMAL
HPRA INTERPRETATION: NORMAL
SERUM COLLECTION DT - LUMINEX CLASS I: NORMAL
SERUM COLLECTION DT - LUMINEX CLASS II: NORMAL
SERUM COLLECTION DT - XM ALLO: NORMAL
SERUM COLLECTION DT - XM ALLO: NORMAL
SPCL1 TESTING DATE: NORMAL
SPCL2 TESTING DATE: NORMAL
SPCLU TESTING DATE: NORMAL
SPLUA TESTING DATE: NORMAL
T CELL RESULTS - XM ALLO: NEGATIVE
T CELL RESULTS - XM ALLO: NEGATIVE
T MCS AVERAGE - XM ALLO: 0.1
T MCS AVERAGE - XM ALLO: 0.3

## 2023-01-01 PROCEDURE — 3077F PR MOST RECENT SYSTOLIC BLOOD PRESSURE >= 140 MM HG: ICD-10-PCS | Mod: CPTII,NTX,S$GLB, | Performed by: INTERNAL MEDICINE

## 2023-01-01 PROCEDURE — 3078F DIAST BP <80 MM HG: CPT | Mod: CPTII,NTX,S$GLB, | Performed by: INTERNAL MEDICINE

## 2023-01-01 PROCEDURE — 99999 PR PBB SHADOW E&M-EST. PATIENT-LVL V: ICD-10-PCS | Mod: PBBFAC,TXP,, | Performed by: INTERNAL MEDICINE

## 2023-01-01 PROCEDURE — 86825 HLA X-MATH NON-CYTOTOXIC: CPT | Mod: 91,PO,TXP | Performed by: NURSE PRACTITIONER

## 2023-01-01 PROCEDURE — 3079F PR MOST RECENT DIASTOLIC BLOOD PRESSURE 80-89 MM HG: ICD-10-PCS | Mod: CPTII,NTX,S$GLB, | Performed by: INTERNAL MEDICINE

## 2023-01-01 PROCEDURE — 99215 PR OFFICE/OUTPT VISIT, EST, LEVL V, 40-54 MIN: ICD-10-PCS | Mod: NTX,S$GLB,, | Performed by: INTERNAL MEDICINE

## 2023-01-01 PROCEDURE — 1159F MED LIST DOCD IN RCRD: CPT | Mod: CPTII,NTX,S$GLB, | Performed by: INTERNAL MEDICINE

## 2023-01-01 PROCEDURE — 86832 HLA CLASS I HIGH DEFIN QUAL: CPT | Mod: PO,TXP | Performed by: NURSE PRACTITIONER

## 2023-01-01 PROCEDURE — 86826 HLA X-MATCH NONCYTOTOXC ADDL: CPT | Mod: 91,PO,TXP | Performed by: NURSE PRACTITIONER

## 2023-01-01 PROCEDURE — 3079F DIAST BP 80-89 MM HG: CPT | Mod: CPTII,NTX,S$GLB, | Performed by: INTERNAL MEDICINE

## 2023-01-01 PROCEDURE — 3066F PR DOCUMENTATION OF TREATMENT FOR NEPHROPATHY: ICD-10-PCS | Mod: CPTII,NTX,S$GLB, | Performed by: INTERNAL MEDICINE

## 2023-01-01 PROCEDURE — 86977 RBC SERUM PRETX INCUBJ/INHIB: CPT | Mod: 59,PO,TXP | Performed by: NURSE PRACTITIONER

## 2023-01-01 PROCEDURE — 99001 SPECIMEN HANDLING PT-LAB: CPT | Mod: PO,TXP | Performed by: NURSE PRACTITIONER

## 2023-01-01 PROCEDURE — 3077F SYST BP >= 140 MM HG: CPT | Mod: CPTII,NTX,S$GLB, | Performed by: INTERNAL MEDICINE

## 2023-01-01 PROCEDURE — 99214 PR OFFICE/OUTPT VISIT, EST, LEVL IV, 30-39 MIN: ICD-10-PCS | Mod: NTX,S$GLB,, | Performed by: INTERNAL MEDICINE

## 2023-01-01 PROCEDURE — 99999 PR PBB SHADOW E&M-EST. PATIENT-LVL V: CPT | Mod: PBBFAC,TXP,, | Performed by: INTERNAL MEDICINE

## 2023-01-01 PROCEDURE — 3008F PR BODY MASS INDEX (BMI) DOCUMENTED: ICD-10-PCS | Mod: CPTII,NTX,S$GLB, | Performed by: INTERNAL MEDICINE

## 2023-01-01 PROCEDURE — 86833 HLA CLASS II HIGH DEFIN QUAL: CPT | Mod: PO,TXP | Performed by: NURSE PRACTITIONER

## 2023-01-01 PROCEDURE — 1159F PR MEDICATION LIST DOCUMENTED IN MEDICAL RECORD: ICD-10-PCS | Mod: CPTII,NTX,S$GLB, | Performed by: INTERNAL MEDICINE

## 2023-01-01 PROCEDURE — 3008F BODY MASS INDEX DOCD: CPT | Mod: CPTII,NTX,S$GLB, | Performed by: INTERNAL MEDICINE

## 2023-01-01 PROCEDURE — 99214 OFFICE O/P EST MOD 30 MIN: CPT | Mod: NTX,S$GLB,, | Performed by: INTERNAL MEDICINE

## 2023-01-01 PROCEDURE — 3066F NEPHROPATHY DOC TX: CPT | Mod: CPTII,NTX,S$GLB, | Performed by: INTERNAL MEDICINE

## 2023-01-01 PROCEDURE — 86825 HLA X-MATH NON-CYTOTOXIC: CPT | Mod: PO,TXP | Performed by: NURSE PRACTITIONER

## 2023-01-01 PROCEDURE — 99215 OFFICE O/P EST HI 40 MIN: CPT | Mod: NTX,S$GLB,, | Performed by: INTERNAL MEDICINE

## 2023-01-01 PROCEDURE — 86826 HLA X-MATCH NONCYTOTOXC ADDL: CPT | Mod: PO,TXP | Performed by: NURSE PRACTITIONER

## 2023-01-01 PROCEDURE — 3078F PR MOST RECENT DIASTOLIC BLOOD PRESSURE < 80 MM HG: ICD-10-PCS | Mod: CPTII,NTX,S$GLB, | Performed by: INTERNAL MEDICINE

## 2023-01-01 RX ORDER — BUPROPION HYDROCHLORIDE 75 MG/1
75 TABLET ORAL DAILY
COMMUNITY
Start: 2023-01-01

## 2023-01-01 RX ORDER — PREDNISONE 5 MG/1
5 TABLET ORAL
Qty: 30 TABLET | Refills: 11 | Status: SHIPPED | OUTPATIENT
Start: 2023-01-01

## 2023-01-01 RX ORDER — MYCOPHENOLIC ACID 180 MG/1
360 TABLET, DELAYED RELEASE ORAL 2 TIMES DAILY
Qty: 360 TABLET | Refills: 3 | Status: SHIPPED | OUTPATIENT
Start: 2023-01-01

## 2023-01-01 RX ORDER — VALACYCLOVIR HYDROCHLORIDE 500 MG/1
TABLET, FILM COATED ORAL
Qty: 90 TABLET | Refills: 3 | Status: SHIPPED | OUTPATIENT
Start: 2023-01-01 | End: 2023-01-01

## 2023-01-01 RX ORDER — TACROLIMUS 1 MG/1
CAPSULE ORAL
Qty: 60 CAPSULE | Refills: 0 | OUTPATIENT
Start: 2023-01-01

## 2023-01-01 RX ORDER — NEPHROCAP 1 MG
1 CAP ORAL DAILY
COMMUNITY
Start: 2022-11-23

## 2023-01-20 NOTE — TELEPHONE ENCOUNTER
Patient informed her committee date has been postponed until 1/27/23. She is outstanding a PETH and thyroid panel lab collect. Patient reports understanding, and agrees to have labs scheduled at [facility] in [place] on 1/23/23 at 11 am. Denies other questions or concerns at this time.

## 2023-01-20 NOTE — TELEPHONE ENCOUNTER
----- Message from Ruben Ferrell MD sent at 1/20/2023 11:04 AM CST -----  Regarding: RE: Clearance for kidney transplant  Yes    ----- Message -----  From: Andreia Coker RN  Sent: 1/20/2023   9:29 AM CST  To: Ruben Ferrell MD  Subject: Clearance for kidney transplant                  Hi Dr. Ferrell,  This patient is s/p liver txp 2011 for alcoholic cirrhosis. Per your telephone encounter on 12/2/22 Stable labs, no medication changes.  She is currently in evaluation for kidney transplant. Is she ok to proceed as a possible kidney transplant candidate?    Thanks for your time,  Andreia

## 2023-01-23 NOTE — PROGRESS NOTES
Spoke to the patient regarding her upcoming today for labs. The patient confirmed her appointment date time and location. The patient have no further questions or concerns at this time.

## 2023-01-24 NOTE — PROGRESS NOTES
Kidney/Liver Post-Transplant Assessment    Referring Physician: Melissa Garcias  Current Nephrologist:     ORGAN: RIGHT KIDNEY  Donor Type: donation after brain death  PHS Increased Risk: no  Cold Ischemia: 480 mins  Induction Medications: steroids (prednisone,methylprednisolone,solumedrol,medrol,decadron)    Subjective:     CC:  Reassessment of renal allograft function and management of chronic immunosuppression.    HPI:  Ms. Lentz is a 63 y.o. year old White female who received a donation after brain death kidney and liver transplant on 11/29/11.  She has CKD 4-5. her baseline creatinine is between 3.2-3.6. She takes mycophenolate mofetil, prednisone and tacrolimus for maintenance immunosuppression. She denies any recent hospitalizations or ER visits since her previous clinic visit.     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    - long-standing history of kidney stones, she required surgery in right kidney (stone extraction). She also had lithotrypsy x 2 in early 20s.   A Txp US 2012 showed two small hyperechoic structures that may represent nonobstructing stones.    - History of Pneumonia  - ARF 11/2019 creat 3.52 improved to 1.7  - CKD 4-5.  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       She id doing better in last few months. No hospitalization. Her N/V under control. She follows with GI team. She sees her nephrologist every few weeks. she denies any chest pain, shortness of breath, ENT symptoms, dysuria, frequency, urgency, or pain over the allograft. She is in process of the second kidney tx evaluation. She has potential donor as well.           Review of Systems     Skin: no skin rash  CNS; no headaches, blurred  vision, seizure, or syncope  ENT: No JVD,  Adenopathies,  nasal congestion. No oral lesions  Cardiac: No chest pain, dyspnea, claudication, edema or palpitations  Respiratory: No SOB, cough, hemoptysis   Gastro-intestinal: No diarrhea, constipation, abdominal pain, nausea, vomit. No ascitis  Genitourinary: + UTI last week.   Musculoskeletal: joint pain, arthritis or vasculitic changes  Psych: alert awake, oriented, No cranial nerves deficit.    Physical Exam     Resting comfortably, NAD  Skin- w/o rash on exposed areas  Neck- no JVD  Lungs- no rhonchi, no rale  Heart: regular, no murmur, no rub  Abd: soft, ND, NT, +surgical scar   Ext: edema- none       Vitals:    01/25/23 1309   BP: (!) 168/79   Pulse: 82   Resp: 16   Temp: 97.2 °F (36.2 °C)         Lab Results   Component Value Date    WBC 5.77 11/30/2022    HGB 10.0 (L) 11/30/2022    HCT 31.7 (L) 11/30/2022     11/30/2022    K 4.6 11/30/2022     (H) 11/30/2022    CO2 18 (L) 11/30/2022    BUN 58 (H) 11/30/2022    CREATININE 3.5 (H) 11/30/2022    EGFRNONAA 17.3 (A) 01/25/2022    GLUCOSE 130 (H) 11/29/2011    CALCIUM 8.8 11/30/2022    PHOS 5.0 (H) 09/29/2022    MG 2.0 01/24/2022    ALBUMIN 3.6 11/30/2022    AST 11 11/30/2022    ALT 6 (L) 11/30/2022       Lab Results   Component Value Date    EXTWBC 4.1 11/29/2022    EXTSEGS 73.6 11/29/2022    EXTPLATELETS 313 11/29/2022    EXTHEMOGLOBI 9.9 (L) 11/29/2022    EXTHEMATOCRI 30.8 (L) 11/29/2022    EXTCREATININ 3.34 (H) 11/29/2022    EXTSODIUM 136 (L) 11/29/2022    EXTPOTASSIUM 5.4 (H) 11/29/2022    EXTBUN 59 (H) 11/29/2022    EXTCO2 18 (L) 11/29/2022    EXTCALCIUM 8.5 11/29/2022    EXTPHOSPHORU 5.2 (H) 11/29/2022    EXTGLUCOSE 123 (H) 11/29/2022    EXTALBUMIN 4.1 11/29/2022    EXTAST 19 11/29/2022    EXTALT 7 11/29/2022    EXTBILITOTAL 0.3 11/29/2022    EXTLIPASE <2.0 (A) 11/29/2022       Lab Results   Component Value Date    EXTTACROLVL 2.1 11/22/2022    EXTPROTCRE 2.7 05/05/2021    EXTPROTEINUA 148  05/05/2021    EXTWBCUA 5-10 05/05/2021    EXTRBCUA 0-3 05/05/2021       Labs were reviewed with the patient.    Assessment:     1. Essential hypertension    2. Kidney replaced by transplant    3. Chronic kidney disease (CKD), active medical management without dialysis, stage 4 (severe)          Plan:   She has  CKD stage 4-5. Her Cr stable at 3.5 in last 2 months.  Patient to continue close follow up with the local General nephrologist. Education provided in appropriate fluid intake, maintain good urinary hygine to prevent UTI,  BP control, check regular lab with local nephrologist, and keep prograf level ~ 4.      - visit  local nephrologist every 6-8 weeks with repeated lab   - prograf level goal ~ 4  - Annual visit with kidney transplant team

## 2023-01-25 PROBLEM — R63.0 DECREASED APPETITE: Status: RESOLVED | Noted: 2022-01-24 | Resolved: 2023-01-01

## 2023-01-25 NOTE — TELEPHONE ENCOUNTER
MA notes Per Ashley (RN) with Dr Hughes's Nephrologist office    Per Ashley(RN) pt doesn't miss any appointments at all she is very compliant.  She has great caregiver support, no concerns with transportation or mental health.    Compliance information for selection committee on 01/27/23.    Liss Etienne  Abdominal Transplant MA

## 2023-01-25 NOTE — PROGRESS NOTES
Subjective:       Patient ID: Virginia Lentz is a 63 y.o. female.    Chief Complaint: No chief complaint on file.  HPI  I saw this 63 yr old  lady in the liver transplant follow up clinic. 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    She is now 11 years post transplant and is doing very well.  She had an episode of recidivism but is off alcohol now and is very positive about her long term outlook.    She had an episode of acute kidney rejection early on post op but her LFTs have generally been normal. She is on immunosuppression with Tacrolimus, MMF and prednisone.    She is well and does not have any symptoms related to her abdomen or liver.    - imparled kidney function and had a kidney biopsy in 2019   KIDNEY (PERCUTANEOUS TRANSPLANT BIOPSY): SMALL SAMPLE, 1) MODERATE CHRONIC   ALLOGRAFT NEPHROPATHY; 2) CHANGES SUGGESTIVE OF CHRONIC PYELONEPHRITIS/REFLUX   NEPHROPATHY; 3) FINDINGS CONSISTENT WITH FOCAL ACUTE PYELONEPHRITIS (SEE   COMMENT).     Most recent kidney biopsy: 21  1) SEVERE CHRONIC ALLOGRAFT NEPHROPATHY WITH  CHANGES SUGGESTIVE OF CALCINEURIN INHIBITOR TOXICITY; 2) FINDINGS CONSISTENT WITH FOCAL  ACUTE PYELONEPHRITIS (SEE COMMENT).    Worsening kidney function but not on renal replacement therapy  Has been evaluated for kidney Tx    - episode of pancreatitis in  and more recently  - no alcohol      Lab Results   Component Value Date    ALT 6 (L) 2022    AST 11 2022    GGT 73 (H) 10/20/2014    ALKPHOS 77 2022    BILITOT 0.3 2022     Past Medical History:   Diagnosis Date    Abnormal Pap smear of cervix     ARSEN (acute kidney injury) 2019    Alcoholic cirrhosis 07/10/2012    Anemia     Anxiety     CKD (chronic kidney disease) stage 3, GFR 30-59 ml/min     COVID-19     -donor kidney transplant 07/10/2012    Depression     Essential hypertension  10/22/2018    GERD (gastroesophageal reflux disease)     H/O recurrent pneumonia 10/22/2018    H/O recurrent pneumonia 10/22/2018    Hypocitraturic calcium nephrolithiasis 2013 24 urine showed low urine citrate.    Hypothyroidism 07/10/2012    Liver replaced by transplant 07/10/2012    Need for prophylactic immunotherapy 07/10/2012    Pancreatitis 2011    Pulmonary embolism 10/22/2018    Pulmonary embolism 10/22/2018    Recurrent nephrolithiasis 07/10/2012    Recurrent pneumonia     last episode 2018    Seizures      Past Surgical History:   Procedure Laterality Date    APPENDECTOMY      AUGMENTATION OF BREAST       SECTION      COLPOSCOPY  10/2022    KIDNEY TRANSPLANT      LIVER TRANSPLANT      LOOP ELECTROSURGICAL EXCISION PROCEDURE (LEEP) N/A 2022    Procedure: LEEP (LOOP ELECTROSURGICAL EXCISION PROCEDURE);  Surgeon: Darrell B. Sandifer, MD;  Location: [facility] MAIN OR;  Service: OB/GYN;  Laterality: N/A;    RENAL BIOPSY N/A 2019    Procedure: BIOPSY, KIDNEY;  Surgeon: [facility] Diagnostic Provider;  Location: [facility] OR [address] [place] [address];  Service: Anesthesiology;  Laterality: N/A;  pt instructed to report to 2nd floor @ 10:30; Report to DOSC @12:00 PM, biopsy @ 2:00 PM    TONSILLECTOMY      TRACHEOSTOMY CLOSURE  2012    TRACHEOSTOMY TUBE PLACEMENT       Current Outpatient Medications   Medication Sig    albuterol (PROVENTIL/VENTOLIN HFA) 90 mcg/actuation inhaler Inhale 1 puff into the lungs every 6 (six) hours as needed.    ALPRAZolam (XANAX) 0.5 MG tablet Take 0.5 mg by mouth 2 (two) times a day.    amLODIPine (NORVASC) 10 MG tablet Take 1 tablet (10 mg total) by mouth once daily.    aspirin (ECOTRIN) 81 MG EC tablet Take 81 mg by mouth Daily. 1 Tablet, Delayed Release (E.C.) Oral Every day.      calcitRIOL (ROCALTROL) 0.25 MCG Cap Take 2 capsules (0.5 mcg total) by mouth once daily.    COMP-AIR NEBULIZER COMPRESSOR [place] use as directed    CREON 36,000-114,000- 180,000 unit CpDR  SMARTSIG:3 Capsule(s) By Mouth Every 8 Hours PRN    cyanocobalamin (VITAMIN B-12) 1000 MCG tablet Take 1 tablet (1,000 mcg total) by mouth once daily.    ergocalciferol (ERGOCALCIFEROL) 50,000 unit Cap Take 1 capsule (50,000 Units total) by mouth every 30 days.    famotidine (PEPCID) 20 MG tablet Take 1 tablet (20 mg total) by mouth 2 (two) times daily.    gabapentin (NEURONTIN) 300 MG capsule Take 300 mg by mouth daily as needed. Patient currently not taking with last admin ~4 weeks ago. Would like to keep on board in case of severe nerve pain.    levETIRAcetam (KEPPRA) 250 MG Tab Take 250 mg by mouth 2 (two) times daily.    levothyroxine (SYNTHROID) 50 MCG tablet Take 50 mcg by mouth before breakfast.    loperamide (IMODIUM) 2 mg capsule Take 2 mg by mouth daily as needed. 1 Capsule Oral     losartan (COZAAR) 25 MG tablet Take 25 mg by mouth once daily.    magnesium oxide (MAG-OX) 400 mg tablet Take 1 tablet by mouth 2 (two) times daily. Take 2 tablets in the morning, take 1 tablet at night    mycophenolate (MYFORTIC) 180 MG TbEC Take 2 tablets (360 mg total) by mouth 2 (two) times daily.    ondansetron (ZOFRAN-ODT) 8 MG TbDL Take 8 mg by mouth Use as needed. 1 Tablet, Rapid Dissolve Oral Every 12 hours    predniSONE (DELTASONE) 5 MG tablet Take 1 tablet (5 mg total) by mouth once daily.    promethazine (PHENERGAN) 12.5 MG Tab Take 12.5-25 mg by mouth daily as needed.    sodium bicarbonate 650 MG tablet Take 650 mg by mouth 2 (two) times daily.    tacrolimus (PROGRAF) 0.5 MG Cap Take 4 capsules (2 mg total) by mouth every morning AND 3 capsules (1.5 mg total) every evening. 2mg po QAM and 1.5mg QPM..    tacrolimus (PROGRAF) 1 MG Cap TAKE 1 CAPSULE EVERY 12 HOURS (Patient taking differently: Takes with 0.5   2 mg in the morning and 1.5 mg at night)    valACYclovir (VALTREX) 500 MG tablet TAKE 1 TABLET DAILY.    zolpidem (AMBIEN) 5 MG Tab Take 5 mg by mouth every evening. 1 Tablet Oral At bedtime    buPROPion  (WELLBUTRIN) 75 MG tablet Take 75 mg by mouth once daily.    VIRT-CAPS 1 mg Cap Take 1 capsule by mouth once daily.     No current facility-administered medications for this visit.       Review of Systems   Constitutional:  Negative for activity change, appetite change, chills, fatigue, fever and unexpected weight change.   HENT:  Negative for ear pain, hearing loss, mouth sores, nosebleeds, sore throat and trouble swallowing.    Eyes:  Negative for pain, redness and visual disturbance.   Respiratory:  Negative for cough, chest tightness, shortness of breath and wheezing.    Cardiovascular:  Negative for chest pain, palpitations and leg swelling.   Gastrointestinal:  Negative for abdominal distention, abdominal pain, blood in stool, constipation, diarrhea, nausea and vomiting.   Genitourinary:  Negative for decreased urine volume, difficulty urinating, dysuria, frequency, hematuria, menstrual problem and urgency.   Musculoskeletal:  Negative for arthralgias, back pain, gait problem, joint swelling and myalgias.   Skin:  Negative for pallor, rash and wound.   Neurological:  Negative for dizziness, tremors, seizures, syncope, speech difficulty, weakness, light-headedness, numbness and headaches.   Hematological:  Negative for adenopathy. Does not bruise/bleed easily.   Psychiatric/Behavioral:  Negative for confusion, decreased concentration, dysphoric mood and sleep disturbance. The patient is not nervous/anxious.        Objective:      Physical Exam  Constitutional:       General: She is not in acute distress.     Appearance: She is well-developed.   HENT:      Head: Normocephalic.   Eyes:      Pupils: Pupils are equal, round, and reactive to light.   Neck:      Thyroid: No thyromegaly.      Vascular: No JVD.      Trachea: No tracheal deviation.   Cardiovascular:      Rate and Rhythm: Normal rate and regular rhythm.      Heart sounds: Normal heart sounds. No murmur heard.  Pulmonary:      Effort: Pulmonary effort is  normal.      Breath sounds: Normal breath sounds. No stridor.   Abdominal:      Palpations: Abdomen is soft.   Lymphadenopathy:      Head:      Right side of head: No submental, submandibular, tonsillar, preauricular, posterior auricular or occipital adenopathy.      Left side of head: No submental, submandibular, tonsillar, preauricular, posterior auricular or occipital adenopathy.      Cervical: No cervical adenopathy.   Neurological:      Mental Status: She is alert. She is not disoriented.      Cranial Nerves: No cranial nerve deficit.      Sensory: No sensory deficit.         Assessment:       1. Kidney replaced by transplant    2. Immunosuppression    3. Acute pancreatitis, unspecified complication status, unspecified pancreatitis type    4. Status post liver transplantation          Plan:   Overall well.  Had COVID 19 in Aug 2021 but has recovered well  - continues to have impaired kidney function  - normal liver function  - Liver US in Sep 2022 showed some mild biliary duct dilatation but with normal LFTs, I do not think she merits investigation.    - no change to her immunosuppression  - BP normally controlled but high today- sees her PCP for this    - no contraindiction to kidney Tx    Clinic in 1 year.

## 2023-01-25 NOTE — LETTER
January 25, 2023        Hill Hughes  [address]  [address]  [place] [address]  Phone: [phone]  Fax: [phone]     Melissa Garcias  [address]  [address]  [place] [address]  Phone: [phone]  Fax: [phone]             [facility]  [address]  [facility] [address]  Phone: [phone]   Patient: Virginia Lentz   MR Number: [id number]   YOB: 1960   Date of Visit: 1/25/2023       Dear Dr. Melissa Garcias, [facility]    Thank you for referring Virginia Lentz to me for evaluation. Attached you will find relevant portions of my assessment and plan of care.    If you have questions, please do not hesitate to call me. I look forward to following Virginia Lentz along with you.    Sincerely,    Ruben Ferrell MD    Enclosure    If you would like to receive this communication electronically, please contact [email or web address] or [phone] to request [employer] Link access.    [employer] Link is a tool which provides read-only access to select patient information with whom you have a relationship. Its easy to use and provides real time access to review your patients record including encounter summaries, notes, results, and demographic information.    If you feel you have received this communication in error or would no longer like to receive these types of communications, please e-mail [email or web address]

## 2023-01-25 NOTE — LETTER
January 25, 2023        Hill Hughes  [address]  [address]  [facility] [address]  Phone: [phone]  Fax: [phone]     Melissa Garcias  [address] [facility]  [address]  [facility] [address]  Phone: [phone]  Fax: [phone]             [facility]  [address]  [facility] [address]  Phone: [phone]   Patient: Virginia Lentz   MR Number: [id number]   YOB: 1960   Date of Visit: 1/25/2023       Dear Dr. Melissa Garcias, [facility]    Thank you for referring Virginia Lentz to me for evaluation. Attached you will find relevant portions of my assessment and plan of care.    If you have questions, please do not hesitate to call me. I look forward to following Virginia Lnetz along with you.    Sincerely,    Isa Rasheed MD    Enclosure    If you would like to receive this communication electronically, please contact [email or web address] or [phone] to request [employer] Link access.    [employer] Link is a tool which provides read-only access to select patient information with whom you have a relationship. Its easy to use and provides real time access to review your patients record including encounter summaries, notes, results, and demographic information.    If you feel you have received this communication in error or would no longer like to receive these types of communications, please e-mail [email or web address]

## 2023-01-27 NOTE — COMMITTEE REVIEW
Native Organ Dx: Other, Specify - Hypotension      SELECTION COMMITTEE NOTE    Virginia Lentz was presented at selection committee on 1/27/23.  Patient met selection criteria for kidney transplant related to ESRD due to   Other, Specify - Hypotension.  No absolute contraindications to transplant at this time.  Patient will be placed on the cadaveric wait list pending final financial approval from insurance company.  Patient will return to clinic for routine appointment in 1 year(s). Patient meets criteria for High KDPI kidney offer. Patient meets HCV+ kidney offer. Patient meets criteria for dual/enbloc.    Pancreatic noted and discussed in committee. Patient is to continue follow up with GI.     Patient informed of committee's decision. Reports understanding. Denies any questions or concerns at this time.     Note written by KG Coker RN     ===============================================    I was present at the meeting and attest to the decision of the committee

## 2023-02-10 NOTE — LETTER
February 10, 2023    Virginia Lentz  [address] [address]    Dear Virginia Lentz:  MRN: [id number]    Congratulations! On 2/10/2023, you were placed on  the waiting list for a  donor transplant.    Your candidacy for kidney transplant is based on the following criteria: Chronic Kidney Disease due to Retransplant/Graft Failure Kidney.    Your transplant coordinator while on the waiting list is Katie Mcmahon RN. They can be reached at [phone] or [phone] with any questions.      What to do now?    Ask your living donors to call and begin testing  Give your donors our phone number, [phone]  Make sure your donors have your name and date of birth when they call  You will get transplanted much faster if you have a living donor    Have your blood sent to our Transplant Lab every month  If you are on dialysis - our Transplant Lab will work with your dialysis unit to send your blood every month  If you are not on dialysis   If you live near an [facility] lab, we will schedule you to have blood drawn every month  If you do not live near an [facility] lab, you will be sent blood kits in the mail. You will need to take a kit to your local lab or doctor to have your blood drawn every month and mail to the Transplant Lab.     Call us with ANY CHANGES  Phone numbers - we must be able to reach you anytime of the day or night when a kidney is available  Address  Insurance coverage  Dialysis unit or kidney doctor  Mark: if you have surgery, stay in the hospital, have to get blood, or have an infection    Review your Kidney/Kidney-Pancreas Transplant Guide   This will give you detailed information about what happens when  you are on the waiting list   you are called when a kidney is available    The [facility] has a transplant surgeon and physician available 365 days a year, 24 hours a day to coordinate organ acceptance, procurement, surgical placement and to address  urgent patient care issues.  You will be notified in writing of any changes to our Transplant Centers staffing plan that would impact your ability to receive a transplant.    Attached is a letter from the [employer] for Organ Sharing (UNOS). It describes the services and information offered to patients by UNOS and the Organ Procurement and Transplant Network. We look forward to working with you while on the waiting list.     Congratulations,    Your Transplant Partner  [facility]   [address] [place] [id number]  [phone]  Jr: [email or web address].                                                                                                                  The Organ Procurement and Transplantation Network   Toll-free patient services line: Your resource for organ transplant information     If you have a question regarding your own medical care, you always should call your transplant hospital first. However, for general organ transplant-related information, you can call the Organ Procurement and Transplantation Network (OPTN) toll-free patient services line at [phone].     Anyone, including potential transplant candidates, candidates, recipients, family members, friends, living donors, and donor family members, can call this number to:     Talk about organ donation, living donation, the transplant process, the donation process, and transplant policies.   Get a free patient information kit with helpful booklets, waiting list and transplant information, and a list of all transplant hospitals.   Ask questions about the OPTN website (https://optn.transplant.hrsa.gov/), the [employer] for Organ Sharings (UNOS) website (https://unos.org/), or the UNOS website for living donors and transplant recipients. (https://www.transplantliving.org/).   Learn how the OPTN can help you.   Talk about any concerns that you may have with a transplant hospital.     The nations transplant  system, the OPTN, is managed under federal contract by the [employer] for Organ Sharing (UNOS), which is a non-profit charitable organization. The OPTN helps create and define organ sharing policies that make the best use of donated organs. This process continuously evaluating new advances and discoveries so policies can be adapted to best serve patients waiting for transplants. To do so, the OPTN works closely with transplant professionals, transplant patients, transplant candidates, donor families, living donors, and the public. All transplant programs and organ procurement organizations throughout the country are OPTN members and are obligated to follow the policies the OPTN creates for allocating organs.     The OPTN also is responsible for:   Providing educational material for patients, the public, and professionals.   Raising awareness of the need for donated organs and tissue.   Coordinating organ procurement, matching, and placement.   Collecting information about every organ transplant and donation that occurs in the [place].     Remember, you should contact your transplant hospital directly if you have questions or concerns about your own medical care including medical records, work-up progress, and test results.     We are not your transplant hospital, and our staff will not be able to answer questions about your case, so please keep your transplant hospitals phone number handy.   However, while you research your transplant needs and learn as much as you can about transplantation and donation, we welcome your call to our toll-free patient services line at [phone].

## 2023-02-10 NOTE — LETTER
February 10, 2023    Virginia Lentz  [address] [address]    Dear Virginia Lentz:  MRN: [id number]    Congratulations! On 2/10/2023, you were placed on  the waiting list for a  donor transplant.    Your candidacy for kidney transplant is based on the following criteria: Chronic Kidney Disease due to Retransplant/Graft Failure Kidney.    Your transplant coordinator while on the waiting list is Katie Mcmahon RN. They can be reached at [phone] or [phone] with any questions.      What to do now?    Ask your living donors to call and begin testing  Give your donors our phone number, [phone]  Make sure your donors have your name and date of birth when they call  You will get transplanted much faster if you have a living donor    Have your blood sent to our Transplant Lab every month  If you are on dialysis - our Transplant Lab will work with your dialysis unit to send your blood every month  If you are not on dialysis   If you live near an [facility] lab, we will schedule you to have blood drawn every month  If you do not live near an [facility] lab, you will be sent blood kits in the mail. You will need to take a kit to your local lab or doctor to have your blood drawn every month and mail to the Transplant Lab.     Call us with ANY CHANGES  Phone numbers - we must be able to reach you anytime of the day or night when a kidney is available  Address  Insurance coverage  Dialysis unit or kidney doctor  Mark: if you have surgery, stay in the hospital, have to get blood, or have an infection    Review your Kidney/Kidney-Pancreas Transplant Guide   This will give you detailed information about what happens when  you are on the waiting list   you are called when a kidney is available    The [facility] has a transplant surgeon and physician available 365 days a year, 24 hours a day to coordinate organ acceptance, procurement, surgical placement and to address  urgent patient care issues.  You will be notified in writing of any changes to our Transplant Centers staffing plan that would impact your ability to receive a transplant.    Attached is a letter from the [employer] Organ Sharing (UNOS). It describes the services and information offered to patients by UNOS and the Organ Procurement and Transplant Network. We look forward to working with you while on the waiting list.     Congratulations,    Your Transplant Partner  [facility]   [address] [place] [id number]  [phone]  Jr:Kirk.                                                                                                                    The Organ Procurement and Transplantation Network   Toll-free patient services line: Your resource for organ transplant information     If you have a question regarding your own medical care, you always should call your transplant hospital first. However, for general organ transplant-related information, you can call the Organ Procurement and Transplantation Network (OPTN) toll-free patient services line at [phone].     Anyone, including potential transplant candidates, candidates, recipients, family members, friends, living donors, and donor family members, can call this number to:     Talk about organ donation, living donation, the transplant process, the donation process, and transplant policies.   Get a free patient information kit with helpful booklets, waiting list and transplant information, and a list of all transplant hospitals.   Ask questions about the OPTN website (https://optn.transplant.hrsa.gov/), the [employer] Organ Sharings (UNOS) website (https://unos.org/), or the UNOS website for living donors and transplant recipients. (https://www.transplantliving.org/).   Learn how the OPTN can help you.   Talk about any concerns that you may have with a transplant hospital.     The nations  transplant system, the OPTN, is managed under federal contract by the [employer] Organ Sharing (UNOS), which is a non-profit charitable organization. The OPTN helps create and define organ sharing policies that make the best use of donated organs. This process continuously evaluating new advances and discoveries so policies can be adapted to best serve patients waiting for transplants. To do so, the OPTN works closely with transplant professionals, transplant patients, transplant candidates, donor families, living donors, and the public. All transplant programs and organ procurement organizations throughout the country are OPTN members and are obligated to follow the policies the OPTN creates for allocating organs.     The OPTN also is responsible for:   Providing educational material for patients, the public, and professionals.   Raising awareness of the need for donated organs and tissue.   Coordinating organ procurement, matching, and placement.   Collecting information about every organ transplant and donation that occurs in the [place].     Remember, you should contact your transplant hospital directly if you have questions or concerns about your own medical care including medical records, work-up progress, and test results.     We are not your transplant hospital, and our staff will not be able to answer questions about your case, so please keep your transplant hospitals phone number handy.   However, while you research your transplant needs and learn as much as you can about transplantation and donation, we welcome your call to our toll-free patient services line at [phone].

## 2023-02-10 NOTE — TELEPHONE ENCOUNTER
"  KIDNEY WAIT LISTING NOTE    Date of Financial clearance to list: 2/3/2023    SSN/[facility]:     Organ: Kidney    Last Name: Tor  First Name: [place]    : 1960       Gender: female        MRN#: [id number]                                   State of Permanent Residence: [address] [place] [address]    Ethnicity: Not  or /a   Race:      White    CLINICAL INFORMATION   Candidate Medical Urgency Status: Active (1)  Number of Previous Kidney Transplants: 1  Number of Previous Solid Organ Transplants: 2  Did you enter number of previous kidney or other solid organ transplants? yes  Is this Candidate a Prior Living Donor: no  (If yes, please generate letter to UN[employer] with patient's date of donation, recipient SSN, signed by Surgical Director after patient is listed in order to receive priority points).      ABO  ABO Blood Group:   O POS     ABO Confirmation: (THESE DATES MUST BE PRIOR TO THE LIST DATE AND SUPPORTED BY SEPARATE LAB REPORTS)    Internal Results    Lab Results   Component Value Date    GROUPTRH O POS 2022    GROUPTRH O POS 2022     Lab Results   Component Value Date    ABO O 2011       External Results    ABO Date 1:    ABO Date 2  Are either of these ABO results based on External Labs? no  (If Yes, STOP and go to source document in Media Tab for verification).    VITALS  Height:  5'1"  Weight:  46 kg   (Use height from Transplant clinic visits only).  Did you enter height/weight? Yes    HLA    Class I:  Lab Results   Component Value Date    JFJS5VR 3 2022    PHIN4VT 24 2022    KFET1VX 7 2022    TQFY6EE 55 2022    FOKFB3NC 6 2022    UYZBV0AZ XX 2022    UKNKR9FU 9 2022    GAICG8FV 7 2022       Class II:  Lab Results   Component Value Date    BJWTSB47TO 14 2022    LLVPJY94LD 15 2022    BAUDBE393IS 52 2022    GIIJJZ4791 51 2022    QNBYU4DS 5 2022    XJVRJ0ZI 6 2022 " "      Tested for HLA Antibodies: Yes, no antibodies detected     If result is "Positive" antibodies are detected     If result is "Negative or questionable" no antibodies detected    Lab Results   Component Value Date    CIPRAS Negative 09/29/2022    CIIPRAS Negative 09/29/2022       DIALYSIS INFORMATION  Is patient Pre-Dialysis: Yes     GFR Information  Report GFR being used as the criteria for placement on the kidney list. If not, leave blank  GFR < or = 20 ml/min? Yes  If Yes, Specify value  ___   ml/min   14.1  Initial date GFR became 20 or less: 11/30/2022  Is GFR obtained from an Outside lab Result? No  (If YES verify with source document scanned into media)    If patient on Dialysis:    Is candidate currently on dialysis for ESRD? No  If Yes,  Date Chronic Dialysis Started:   (Not currently on dialysis)  (verify with source document in Media Tab)   Dialysis Unit Name: (Not currently on dialysis)                        Physician Name:  Dr. Lorin Garrison  NPI#: [id number]    DIABETES INFORMATION  Primary Native Kidney Diagnosis: Other, Specify - Hypotension  C-Peptide Value - No results found for: CPEPTIDE  Current Diabetes Status: None    FOR NON-KIDNEY DEPARTMENT USE ONLY:  Additional Organs Registered? none    Maximum Acceptable Number of HLA Mismatches  ABDR:     6      (0-6)               AB:               (0-4)  ADR:   _____  (0-4)              BDR: _____ (0-4)  A:        _____  (0-2)              B:      _____ (0-2)          DR: ______ (0-2)    Will Recipient Accept?   Accept HBcAB Positive Organ:            Yes  Accept HBV TRICIA Positive Organ:        no  Accept HCV Antibody Positive Organ: yes   Accept HCV TRICIA Positive Organ: yes    Dual Kidney and En Bloc Opt In : Yes  Dual  Local:   Yes  Dual Import:   Yes  En Bloc Local:   Yes  En Bloc Import: Yes    Accept KDPI > 85: Single: Yes     Local: Yes     Import: Yes  Accept KDPI > 85: Dual: Yes     Local: Yes     Import: Yes    ### NURSE TO VERIFY " CONSENT AND MAKE ANY NECESSARY CHANGES NEEDED IN UNET AT THE TIME OF VERIFICATION ###    Unacceptible Antigens  If yes, list     Lab Results   Component Value Date    CIABCLM NO DSA DETECTED 05/07/2021       ### DO NOT LIST IF ANTIGEN VALUE WEAK ###    Hep B Vaccine series completed: no    Blood Type x2, eGFR, and all serologies were verified by myself and Tiara Flores.  Blood type determination and reporting was completed according to the programs protocols and OPTN requirements. Katie Mcmahon will be new coordinator.

## 2023-02-10 NOTE — TELEPHONE ENCOUNTER
Patient informed she is now listed active on the kidney transplant wait list. Denies any current illness or distress. Denies any questions or concerns at this time.

## 2023-02-10 NOTE — LETTER
February 10, 2023    Virginia Lentz  [address] [address]    Dear Virginia Lentz:  MRN: [id number]    Congratulations! On 2/10/2023, you were placed on  the waiting list for a  donor transplant.    Your candidacy for kidney transplant is based on the following criteria: Chronic Kidney Disease due to Retransplant/Graft Failure Kidney.    Your transplant coordinator while on the waiting list is Katie Mcmahon RN. They can be reached at [phone] or [phone] with any questions.      What to do now?    Ask your living donors to call and begin testing  Give your donors our phone number, [phone]  Make sure your donors have your name and date of birth when they call  You will get transplanted much faster if you have a living donor    Have your blood sent to our Transplant Lab every month  If you are on dialysis - our Transplant Lab will work with your dialysis unit to send your blood every month  If you are not on dialysis   If you live near an [facility] lab, we will schedule you to have blood drawn every month  If you do not live near an [facility] lab, you will be sent blood kits in the mail. You will need to take a kit to your local lab or doctor to have your blood drawn every month and mail to the Transplant Lab.     Call us with ANY CHANGES  Phone numbers - we must be able to reach you anytime of the day or night when a kidney is available  Address  Insurance coverage  Dialysis unit or kidney doctor  Mark: if you have surgery, stay in the hospital, have to get blood, or have an infection    Review your Kidney/Kidney-Pancreas Transplant Guide   This will give you detailed information about what happens when  you are on the waiting list   you are called when a kidney is available    The [facility] has a transplant surgeon and physician available 365 days a year, 24 hours a day to coordinate organ acceptance, procurement, surgical placement and to address  urgent patient care issues.  You will be notified in writing of any changes to our Transplant Centers staffing plan that would impact your ability to receive a transplant.    Attached is a letter from the [employer] for Organ Sharing (UNOS). It describes the services and information offered to patients by UNOS and the Organ Procurement and Transplant Network. We look forward to working with you while on the waiting list.     Congratulations,    Your Transplant Partner  [facility]   [address] [place] [address]  [phone]  Jr: Kirk.  Cc: Dr. Hill Hughes                                                                                                                     The Organ Procurement and Transplantation Network   Toll-free patient services line: Your resource for organ transplant information     If you have a question regarding your own medical care, you always should call your transplant hospital first. However, for general organ transplant-related information, you can call the Organ Procurement and Transplantation Network (OPTN) toll-free patient services line at [phone].     Anyone, including potential transplant candidates, candidates, recipients, family members, friends, living donors, and donor family members, can call this number to:     Talk about organ donation, living donation, the transplant process, the donation process, and transplant policies.   Get a free patient information kit with helpful booklets, waiting list and transplant information, and a list of all transplant hospitals.   Ask questions about the OPTN website (https://optn.transplant.hrsa.gov/), the [employer] for Organ Sharings (UNOS) website (https://unos.org/), or the UNOS website for living donors and transplant recipients. (https://www.transplantliving.org/).   Learn how the OPTN can help you.   Talk about any concerns that you may have with a transplant hospital.      The nations transplant system, the OPTN, is managed under federal contract by the [employer] Organ Sharing (UNOS), which is a non-profit charitable organization. The OPTN helps create and define organ sharing policies that make the best use of donated organs. This process continuously evaluating new advances and discoveries so policies can be adapted to best serve patients waiting for transplants. To do so, the OPTN works closely with transplant professionals, transplant patients, transplant candidates, donor families, living donors, and the public. All transplant programs and organ procurement organizations throughout the country are OPTN members and are obligated to follow the policies the OPTN creates for allocating organs.     The OPTN also is responsible for:   Providing educational material for patients, the public, and professionals.   Raising awareness of the need for donated organs and tissue.   Coordinating organ procurement, matching, and placement.   Collecting information about every organ transplant and donation that occurs in the [place].     Remember, you should contact your transplant hospital directly if you have questions or concerns about your own medical care including medical records, work-up progress, and test results.     We are not your transplant hospital, and our staff will not be able to answer questions about your case, so please keep your transplant hospitals phone number handy.   However, while you research your transplant needs and learn as much as you can about transplantation and donation, we welcome your call to our toll-free patient services line at [phone].

## 2023-02-15 NOTE — PROGRESS NOTES
YEARLY LIST MANAGEMENT NOTE    Virginia Lentz's kidney transplant listing status reviewed.  Patient is due for follow-up appointments on 9/29/23.  Appointments will be scheduled per protocol.

## 2023-03-07 NOTE — TELEPHONE ENCOUNTER
Answered questions about a possible  donor.    ----- Message from Nestor Blank sent at 3/7/2023  8:06 AM CST -----  Regarding: call back  Pt's  all to speak with someone in regards to some question he has    Call

## 2023-05-30 NOTE — TELEPHONE ENCOUNTER
----- Message from Deborah Campos sent at 5/30/2023  8:40 AM CDT -----  Regarding: labs  Pt calling to inform that she will have labs done on tomorrow.         [phone]

## 2023-06-15 NOTE — LETTER
Josefina 15, 2023    Virginia Lentz  [address] [id number]          Dear [place] Lentz:  MRN: [id number]    This is a follow up to your recent labs, your lab results were stable.  There are no medicine changes.  Please have your labs drawn again on 11/13/23.      If you cannot have your labs drawn on the scheduled date, it is your responsibility to call the transplant department to reschedule.  Please call [phone] and ask to speak to Gogo MEEK   for all scheduling requests.     Sincerely,      Angelic SERVINN, RN  Your Liver Transplant Coordinator    [facility]  [address] [place] [address]  [phone]

## 2023-06-15 NOTE — TELEPHONE ENCOUNTER
Stable labs, no medication changes.  Next labs 11/13/23, letter sent.  ----- Message from Ruben Ferrell MD sent at 6/14/2023  1:39 AM CDT -----  Results reviewed

## 2023-07-17 NOTE — TELEPHONE ENCOUNTER
I have attempted without success to contact this patient by phone to schedule appt for annual clinic visit. Message was left to return call.

## 2023-07-18 NOTE — TELEPHONE ENCOUNTER
Spoke to pt confirming appts on 10/13/2023 per pt's request. Appt reminders were mailed on 07/18/2023 and pt is aware to bring care giver.

## 2023-09-05 NOTE — TELEPHONE ENCOUNTER
Spoke with Shorty, let him know we could not discuss where the donor is in the process.  Advised him to let her know to call us with any questions.      ----- Message from Deborah Campos sent at 9/5/2023  3:53 PM CDT -----  Regarding: Living Donor  Contact: Shorty  Regarding: Living Donor        Name Of Caller: Shorty        Contact Preference: Shorty Lentz (Spouse)   [phone] (Mobile)     Nature of call:  pt  Shorty is calling to inquire about the status of a living donor. Donor states she sent labs in on 03/27/2023. Requesting a call back.      Name :Vaishali DAY : didn't have on hand       Phone : [phone]

## 2023-10-17 NOTE — TELEPHONE ENCOUNTER
Spoke to pt confirming rescheduled appts on 11/16/2023. Appt reminders were mailed on 10/17/2023 and pt is aware to bring care giver.

## 2023-11-06 NOTE — TELEPHONE ENCOUNTER
Spoke to pt  confirming appts for testing and clinic visit on 01/04/2024, pt is aware to bring caregiver to the appt. Appt reminders were mailed on 11/6/2023.

## 2023-11-28 NOTE — TELEPHONE ENCOUNTER
Dr. Ferrell notified of patient's hospital status per spouse portal message.  Physician and hospital contact information also given to Dr. Ferrell for communication and care.

## 2023-12-05 ENCOUNTER — TELEPHONE (OUTPATIENT)
Dept: TRANSPLANT | Facility: CLINIC | Age: 63
End: 2023-12-05
Payer: COMMERCIAL

## 2024-05-07 NOTE — TELEPHONE ENCOUNTER
Instructed pt to start taking Prograf 3 mg in Am and 2 mg in PM, repeat labs on 11/4.  Pt repeated instructions and verbalized understanding.   The patient is a 30y Female complaining of psychiatric evaluation.